# Patient Record
Sex: MALE | Race: WHITE | NOT HISPANIC OR LATINO
[De-identification: names, ages, dates, MRNs, and addresses within clinical notes are randomized per-mention and may not be internally consistent; named-entity substitution may affect disease eponyms.]

---

## 2015-05-25 RX ORDER — INSULIN GLARGINE 100 [IU]/ML
22 INJECTION, SOLUTION SUBCUTANEOUS
Qty: 0 | Refills: 0 | DISCHARGE
Start: 2015-05-25

## 2015-05-25 RX ORDER — INSULIN LISPRO 100/ML
10 VIAL (ML) SUBCUTANEOUS
Qty: 0 | Refills: 0 | DISCHARGE
Start: 2015-05-25

## 2019-08-20 ENCOUNTER — APPOINTMENT (OUTPATIENT)
Dept: UROLOGY | Facility: CLINIC | Age: 84
End: 2019-08-20
Payer: MEDICARE

## 2019-08-20 VITALS
HEART RATE: 77 BPM | TEMPERATURE: 98.9 F | HEIGHT: 70 IN | BODY MASS INDEX: 21.47 KG/M2 | WEIGHT: 150 LBS | DIASTOLIC BLOOD PRESSURE: 46 MMHG | SYSTOLIC BLOOD PRESSURE: 114 MMHG

## 2019-08-20 DIAGNOSIS — Z86.73 PERSONAL HISTORY OF TRANSIENT ISCHEMIC ATTACK (TIA), AND CEREBRAL INFARCTION W/OUT RESIDUAL DEFICITS: ICD-10-CM

## 2019-08-20 DIAGNOSIS — N13.8 BENIGN PROSTATIC HYPERPLASIA WITH LOWER URINARY TRACT SYMPMS: ICD-10-CM

## 2019-08-20 DIAGNOSIS — N52.9 MALE ERECTILE DYSFUNCTION, UNSPECIFIED: ICD-10-CM

## 2019-08-20 DIAGNOSIS — N40.1 BENIGN PROSTATIC HYPERPLASIA WITH LOWER URINARY TRACT SYMPMS: ICD-10-CM

## 2019-08-20 DIAGNOSIS — R32 UNSPECIFIED URINARY INCONTINENCE: ICD-10-CM

## 2019-08-20 LAB
BILIRUB UR QL STRIP: NEGATIVE
COLLECTION METHOD: NORMAL
GLUCOSE UR-MCNC: NORMAL
HCG UR QL: 0.2 EU/DL
HGB UR QL STRIP.AUTO: NORMAL
KETONES UR-MCNC: NEGATIVE
LEUKOCYTE ESTERASE UR QL STRIP: NORMAL
NITRITE UR QL STRIP: NEGATIVE
PH UR STRIP: 5.5
PROT UR STRIP-MCNC: NORMAL
SP GR UR STRIP: 1.02

## 2019-08-20 PROCEDURE — 51798 US URINE CAPACITY MEASURE: CPT

## 2019-08-20 PROCEDURE — 99204 OFFICE O/P NEW MOD 45 MIN: CPT | Mod: 25

## 2019-08-20 NOTE — HISTORY OF PRESENT ILLNESS
[FreeTextEntry1] : Patient is a 89 yo M who presents for ED, urinary incontinence.\par \par These are chronic urologic conditions that have been going for many years. \par \par He sits to void and does feel that he is able to void/initiate stream.  He wears diapers x3 daily - very wet when changed.  He feels that he has post-void dribbling and urge incontinence.  He also leaks at nighttime, places a pad on bed.  At times he has essentially unaware incontinence or post void dribbling - he is unable to really differentiate.\par \par He was previously seen by my partner Dr Barnett ~5 yrs ago.  Had UDS in 2012 which did not reveal any MAGDALENE or significant OAB or BPH/PERALTA.  He was given vesicare - unclear if he had any benefit.\par \par He also reports severe ED - cannot obtain or maintain erections.  He states that his wife is very upset about not having sex.  Has never tried viagra or oral medications per pt.  He cannot obtain even a partial erections but still has desire.

## 2019-08-20 NOTE — ASSESSMENT
[FreeTextEntry1] : Patient is a 89 yo M who presents for urologic issues - mainly ,longstanding severe ED and incontinence.\par \par He is more bothered by ED and incontinence.\par He is not bothered by having use depends diapers and pads.\par He is not interested in treatment for incontinence as he notes he has adapted, particularly after we discussed potential SE and risks of anticholinergics or beta3 agonists.  He had prior UDS with no remarkable findings.\par Suspect bladder dysfunction 2/2 DM.\par PVR today 0cc\par Udip + - will send for urine cx. Suspect bacteruria, but given incontinence, would consider treatment course with abx pending cx results.\par For ED d/w pt that trial of medications such as viagra or cialis is unlikely to be significantly effective for severe ED and do have significant risks.  He has significant comorbidities including cardiac disease, vascular disease, DM.  Discussed with pt that he obtains clearance from his cardiologist would consider trial of medication, but given overall health would not recommend.  \par He will discuss further with his wife and cardiologist.\par F/u PRN

## 2019-08-20 NOTE — REVIEW OF SYSTEMS
[Shortness Of Breath] : shortness of breath [Feeling Tired] : feeling tired [Diarrhea] : diarrhea [Constipation] : constipation [Discharge from urine canal] : discharge from urine canal [Wake up at night to urinate  How many times?  ___] : wakes up to urinate [unfilled] times during the night [Slow urine stream] : slow urine stream [Bladder fullness after urinating] : bladder fullness after urinating [Leakage of urine with urgency] : leakage of urine with urgency [Limb Weakness] : limb weakness [Unaware of when urine is leaking] : unaware of when urine is leaking [Difficulty Walking] : difficulty walking [Muscle Weakness] : muscle weakness [Feelings Of Weakness] : feelings of weakness [Easy Bleeding] : a tendency for easy bleeding [Easy Bruising] : a tendency for easy bruising [Negative] : Psychiatric

## 2019-08-20 NOTE — PHYSICAL EXAM
[General Appearance - Well Nourished] : well nourished [General Appearance - Well Developed] : well developed [Normal Appearance] : normal appearance [Well Groomed] : well groomed [Edema] : no peripheral edema [General Appearance - In No Acute Distress] : no acute distress [Respiration, Rhythm And Depth] : normal respiratory rhythm and effort [Exaggerated Use Of Accessory Muscles For Inspiration] : no accessory muscle use [Abdomen Soft] : soft [Abdomen Tenderness] : non-tender [Urinary Bladder Findings] : the bladder was normal on palpation [FreeTextEntry1] : wheelchair bound [] : no rash [No Focal Deficits] : no focal deficits [Oriented To Time, Place, And Person] : oriented to person, place, and time [Not Anxious] : not anxious [Affect] : the affect was normal [Mood] : the mood was normal

## 2019-08-26 ENCOUNTER — APPOINTMENT (OUTPATIENT)
Age: 84
End: 2019-08-26

## 2019-08-26 LAB — BACTERIA UR CULT: ABNORMAL

## 2019-08-26 RX ORDER — SULFAMETHOXAZOLE AND TRIMETHOPRIM 800; 160 MG/1; MG/1
800-160 TABLET ORAL TWICE DAILY
Qty: 14 | Refills: 0 | Status: ACTIVE | COMMUNITY
Start: 2019-08-26 | End: 1900-01-01

## 2019-10-06 ENCOUNTER — TRANSCRIPTION ENCOUNTER (OUTPATIENT)
Age: 84
End: 2019-10-06

## 2019-10-06 ENCOUNTER — INPATIENT (INPATIENT)
Facility: HOSPITAL | Age: 84
LOS: 2 days | Discharge: HOME | DRG: 378 | End: 2019-10-09
Attending: HOSPITALIST | Admitting: HOSPITALIST
Payer: MEDICARE

## 2019-10-06 VITALS
HEART RATE: 72 BPM | WEIGHT: 149.91 LBS | HEIGHT: 72 IN | SYSTOLIC BLOOD PRESSURE: 118 MMHG | RESPIRATION RATE: 20 BRPM | TEMPERATURE: 98 F | DIASTOLIC BLOOD PRESSURE: 73 MMHG

## 2019-10-06 DIAGNOSIS — I25.10 ATHEROSCLEROTIC HEART DISEASE OF NATIVE CORONARY ARTERY WITHOUT ANGINA PECTORIS: ICD-10-CM

## 2019-10-06 DIAGNOSIS — R07.89 OTHER CHEST PAIN: ICD-10-CM

## 2019-10-06 DIAGNOSIS — K62.5 HEMORRHAGE OF ANUS AND RECTUM: ICD-10-CM

## 2019-10-06 DIAGNOSIS — I10 ESSENTIAL (PRIMARY) HYPERTENSION: ICD-10-CM

## 2019-10-06 DIAGNOSIS — I73.9 PERIPHERAL VASCULAR DISEASE, UNSPECIFIED: ICD-10-CM

## 2019-10-06 DIAGNOSIS — E11.9 TYPE 2 DIABETES MELLITUS WITHOUT COMPLICATIONS: ICD-10-CM

## 2019-10-06 DIAGNOSIS — Z29.9 ENCOUNTER FOR PROPHYLACTIC MEASURES, UNSPECIFIED: ICD-10-CM

## 2019-10-06 DIAGNOSIS — Z79.899 OTHER LONG TERM (CURRENT) DRUG THERAPY: ICD-10-CM

## 2019-10-06 DIAGNOSIS — N17.9 ACUTE KIDNEY FAILURE, UNSPECIFIED: ICD-10-CM

## 2019-10-06 DIAGNOSIS — G40.909 EPILEPSY, UNSPECIFIED, NOT INTRACTABLE, WITHOUT STATUS EPILEPTICUS: ICD-10-CM

## 2019-10-06 LAB
ALBUMIN SERPL ELPH-MCNC: 3.1 G/DL — LOW (ref 3.3–5)
ALP SERPL-CCNC: 101 U/L — SIGNIFICANT CHANGE UP (ref 40–120)
ALT FLD-CCNC: 19 U/L — SIGNIFICANT CHANGE UP (ref 10–45)
ANION GAP SERPL CALC-SCNC: 10 MMOL/L — SIGNIFICANT CHANGE UP (ref 5–17)
ANISOCYTOSIS BLD QL: SLIGHT — SIGNIFICANT CHANGE UP
APTT BLD: 20.8 SEC — LOW (ref 27.5–36.3)
AST SERPL-CCNC: 15 U/L — SIGNIFICANT CHANGE UP (ref 10–40)
BASOPHILS # BLD AUTO: 0.26 K/UL — HIGH (ref 0–0.2)
BASOPHILS NFR BLD AUTO: 2.6 % — HIGH (ref 0–2)
BILIRUB SERPL-MCNC: 0.1 MG/DL — LOW (ref 0.2–1.2)
BLD GP AB SCN SERPL QL: NEGATIVE — SIGNIFICANT CHANGE UP
BUN SERPL-MCNC: 48 MG/DL — HIGH (ref 7–23)
CALCIUM SERPL-MCNC: 7.7 MG/DL — LOW (ref 8.4–10.5)
CHLORIDE SERPL-SCNC: 106 MMOL/L — SIGNIFICANT CHANGE UP (ref 96–108)
CO2 SERPL-SCNC: 17 MMOL/L — LOW (ref 22–31)
CREAT SERPL-MCNC: 1.43 MG/DL — HIGH (ref 0.5–1.3)
EOSINOPHIL # BLD AUTO: 0.09 K/UL — SIGNIFICANT CHANGE UP (ref 0–0.5)
EOSINOPHIL NFR BLD AUTO: 0.9 % — SIGNIFICANT CHANGE UP (ref 0–6)
GIANT PLATELETS BLD QL SMEAR: PRESENT — SIGNIFICANT CHANGE UP
GLUCOSE BLDC GLUCOMTR-MCNC: 215 MG/DL — HIGH (ref 70–99)
GLUCOSE BLDC GLUCOMTR-MCNC: 225 MG/DL — HIGH (ref 70–99)
GLUCOSE BLDC GLUCOMTR-MCNC: 271 MG/DL — HIGH (ref 70–99)
GLUCOSE BLDC GLUCOMTR-MCNC: 358 MG/DL — HIGH (ref 70–99)
GLUCOSE SERPL-MCNC: 237 MG/DL — HIGH (ref 70–99)
HCT VFR BLD CALC: 23.3 % — LOW (ref 39–50)
HCT VFR BLD CALC: 24.3 % — LOW (ref 39–50)
HCT VFR BLD CALC: 24.9 % — LOW (ref 39–50)
HGB BLD-MCNC: 7.5 G/DL — LOW (ref 13–17)
HGB BLD-MCNC: 7.6 G/DL — LOW (ref 13–17)
HGB BLD-MCNC: 8.2 G/DL — LOW (ref 13–17)
INR BLD: 1.08 RATIO — SIGNIFICANT CHANGE UP (ref 0.88–1.16)
LYMPHOCYTES # BLD AUTO: 2.57 K/UL — SIGNIFICANT CHANGE UP (ref 1–3.3)
LYMPHOCYTES # BLD AUTO: 26.1 % — SIGNIFICANT CHANGE UP (ref 13–44)
MACROCYTES BLD QL: SLIGHT — SIGNIFICANT CHANGE UP
MANUAL SMEAR VERIFICATION: SIGNIFICANT CHANGE UP
MCHC RBC-ENTMCNC: 31.3 GM/DL — LOW (ref 32–36)
MCHC RBC-ENTMCNC: 32.2 GM/DL — SIGNIFICANT CHANGE UP (ref 32–36)
MCHC RBC-ENTMCNC: 32.9 GM/DL — SIGNIFICANT CHANGE UP (ref 32–36)
MCHC RBC-ENTMCNC: 34 PG — SIGNIFICANT CHANGE UP (ref 27–34)
MCHC RBC-ENTMCNC: 34.5 PG — HIGH (ref 27–34)
MCHC RBC-ENTMCNC: 35.2 PG — HIGH (ref 27–34)
MCV RBC AUTO: 103.3 FL — HIGH (ref 80–100)
MCV RBC AUTO: 109.4 FL — HIGH (ref 80–100)
MCV RBC AUTO: 110.5 FL — HIGH (ref 80–100)
MONOCYTES # BLD AUTO: 0.6 K/UL — SIGNIFICANT CHANGE UP (ref 0–0.9)
MONOCYTES NFR BLD AUTO: 6.1 % — SIGNIFICANT CHANGE UP (ref 2–14)
NEUTROPHILS # BLD AUTO: 5.99 K/UL — SIGNIFICANT CHANGE UP (ref 1.8–7.4)
NEUTROPHILS NFR BLD AUTO: 60.8 % — SIGNIFICANT CHANGE UP (ref 43–77)
NRBC # BLD: 0 /100 WBCS — SIGNIFICANT CHANGE UP (ref 0–0)
NRBC # BLD: 0 /100 WBCS — SIGNIFICANT CHANGE UP (ref 0–0)
OB PNL STL: POSITIVE
OVALOCYTES BLD QL SMEAR: SLIGHT — SIGNIFICANT CHANGE UP
PLAT MORPH BLD: NORMAL — SIGNIFICANT CHANGE UP
PLATELET # BLD AUTO: 166 K/UL — SIGNIFICANT CHANGE UP (ref 150–400)
PLATELET # BLD AUTO: 174 K/UL — SIGNIFICANT CHANGE UP (ref 150–400)
PLATELET # BLD AUTO: 183 K/UL — SIGNIFICANT CHANGE UP (ref 150–400)
POIKILOCYTOSIS BLD QL AUTO: SLIGHT — SIGNIFICANT CHANGE UP
POTASSIUM SERPL-MCNC: 5.3 MMOL/L — SIGNIFICANT CHANGE UP (ref 3.5–5.3)
POTASSIUM SERPL-SCNC: 5.3 MMOL/L — SIGNIFICANT CHANGE UP (ref 3.5–5.3)
PROT SERPL-MCNC: 5.9 G/DL — LOW (ref 6–8.3)
PROTHROM AB SERPL-ACNC: 12.4 SEC — SIGNIFICANT CHANGE UP (ref 10–12.9)
RBC # BLD: 2.13 M/UL — LOW (ref 4.2–5.8)
RBC # BLD: 2.2 M/UL — LOW (ref 4.2–5.8)
RBC # BLD: 2.41 M/UL — LOW (ref 4.2–5.8)
RBC # FLD: 14.2 % — SIGNIFICANT CHANGE UP (ref 10.3–14.5)
RBC # FLD: 14.4 % — SIGNIFICANT CHANGE UP (ref 10.3–14.5)
RBC # FLD: 17 % — HIGH (ref 10.3–14.5)
RBC BLD AUTO: SIGNIFICANT CHANGE UP
RH IG SCN BLD-IMP: POSITIVE — SIGNIFICANT CHANGE UP
SODIUM SERPL-SCNC: 133 MMOL/L — LOW (ref 135–145)
SPHEROCYTES BLD QL SMEAR: SLIGHT — SIGNIFICANT CHANGE UP
TROPONIN T, HIGH SENSITIVITY RESULT: 25 NG/L — SIGNIFICANT CHANGE UP (ref 0–51)
VARIANT LYMPHS # BLD: 3.5 % — SIGNIFICANT CHANGE UP (ref 0–6)
WBC # BLD: 10.34 K/UL — SIGNIFICANT CHANGE UP (ref 3.8–10.5)
WBC # BLD: 9.14 K/UL — SIGNIFICANT CHANGE UP (ref 3.8–10.5)
WBC # BLD: 9.86 K/UL — SIGNIFICANT CHANGE UP (ref 3.8–10.5)
WBC # FLD AUTO: 10.34 K/UL — SIGNIFICANT CHANGE UP (ref 3.8–10.5)
WBC # FLD AUTO: 9.14 K/UL — SIGNIFICANT CHANGE UP (ref 3.8–10.5)
WBC # FLD AUTO: 9.86 K/UL — SIGNIFICANT CHANGE UP (ref 3.8–10.5)

## 2019-10-06 PROCEDURE — 71045 X-RAY EXAM CHEST 1 VIEW: CPT | Mod: 26

## 2019-10-06 PROCEDURE — 99285 EMERGENCY DEPT VISIT HI MDM: CPT | Mod: 25

## 2019-10-06 PROCEDURE — 93010 ELECTROCARDIOGRAM REPORT: CPT

## 2019-10-06 PROCEDURE — 99223 1ST HOSP IP/OBS HIGH 75: CPT | Mod: GC

## 2019-10-06 RX ORDER — GLUCAGON INJECTION, SOLUTION 0.5 MG/.1ML
1 INJECTION, SOLUTION SUBCUTANEOUS ONCE
Refills: 0 | Status: DISCONTINUED | OUTPATIENT
Start: 2019-10-06 | End: 2019-10-09

## 2019-10-06 RX ORDER — ATENOLOL 25 MG/1
12.5 TABLET ORAL DAILY
Refills: 0 | Status: DISCONTINUED | OUTPATIENT
Start: 2019-10-06 | End: 2019-10-06

## 2019-10-06 RX ORDER — PANTOPRAZOLE SODIUM 20 MG/1
40 TABLET, DELAYED RELEASE ORAL
Refills: 0 | Status: DISCONTINUED | OUTPATIENT
Start: 2019-10-06 | End: 2019-10-08

## 2019-10-06 RX ORDER — SODIUM CHLORIDE 9 MG/ML
1000 INJECTION INTRAMUSCULAR; INTRAVENOUS; SUBCUTANEOUS ONCE
Refills: 0 | Status: COMPLETED | OUTPATIENT
Start: 2019-10-06 | End: 2019-10-06

## 2019-10-06 RX ORDER — DONEPEZIL HYDROCHLORIDE 10 MG/1
10 TABLET, FILM COATED ORAL AT BEDTIME
Refills: 0 | Status: DISCONTINUED | OUTPATIENT
Start: 2019-10-06 | End: 2019-10-09

## 2019-10-06 RX ORDER — SODIUM CHLORIDE 9 MG/ML
1000 INJECTION, SOLUTION INTRAVENOUS
Refills: 0 | Status: DISCONTINUED | OUTPATIENT
Start: 2019-10-06 | End: 2019-10-09

## 2019-10-06 RX ORDER — INSULIN LISPRO 100/ML
5 VIAL (ML) SUBCUTANEOUS
Refills: 0 | Status: DISCONTINUED | OUTPATIENT
Start: 2019-10-06 | End: 2019-10-07

## 2019-10-06 RX ORDER — CLOPIDOGREL BISULFATE 75 MG/1
75 TABLET, FILM COATED ORAL DAILY
Refills: 0 | Status: DISCONTINUED | OUTPATIENT
Start: 2019-10-06 | End: 2019-10-06

## 2019-10-06 RX ORDER — DULOXETINE HYDROCHLORIDE 30 MG/1
30 CAPSULE, DELAYED RELEASE ORAL
Refills: 0 | Status: DISCONTINUED | OUTPATIENT
Start: 2019-10-06 | End: 2019-10-09

## 2019-10-06 RX ORDER — DEXTROSE 50 % IN WATER 50 %
15 SYRINGE (ML) INTRAVENOUS ONCE
Refills: 0 | Status: DISCONTINUED | OUTPATIENT
Start: 2019-10-06 | End: 2019-10-09

## 2019-10-06 RX ORDER — FERROUS SULFATE 325(65) MG
325 TABLET ORAL
Refills: 0 | Status: DISCONTINUED | OUTPATIENT
Start: 2019-10-06 | End: 2019-10-07

## 2019-10-06 RX ORDER — CHOLECALCIFEROL (VITAMIN D3) 125 MCG
1000 CAPSULE ORAL DAILY
Refills: 0 | Status: DISCONTINUED | OUTPATIENT
Start: 2019-10-06 | End: 2019-10-09

## 2019-10-06 RX ORDER — INFLUENZA VIRUS VACCINE 15; 15; 15; 15 UG/.5ML; UG/.5ML; UG/.5ML; UG/.5ML
0.5 SUSPENSION INTRAMUSCULAR ONCE
Refills: 0 | Status: COMPLETED | OUTPATIENT
Start: 2019-10-06 | End: 2019-10-09

## 2019-10-06 RX ORDER — DEXTROSE 50 % IN WATER 50 %
12.5 SYRINGE (ML) INTRAVENOUS ONCE
Refills: 0 | Status: DISCONTINUED | OUTPATIENT
Start: 2019-10-06 | End: 2019-10-09

## 2019-10-06 RX ORDER — INSULIN GLARGINE 100 [IU]/ML
11 INJECTION, SOLUTION SUBCUTANEOUS AT BEDTIME
Refills: 0 | Status: DISCONTINUED | OUTPATIENT
Start: 2019-10-06 | End: 2019-10-07

## 2019-10-06 RX ORDER — INSULIN LISPRO 100/ML
VIAL (ML) SUBCUTANEOUS
Refills: 0 | Status: DISCONTINUED | OUTPATIENT
Start: 2019-10-06 | End: 2019-10-09

## 2019-10-06 RX ORDER — INSULIN LISPRO 100/ML
VIAL (ML) SUBCUTANEOUS AT BEDTIME
Refills: 0 | Status: DISCONTINUED | OUTPATIENT
Start: 2019-10-06 | End: 2019-10-09

## 2019-10-06 RX ORDER — ATORVASTATIN CALCIUM 80 MG/1
40 TABLET, FILM COATED ORAL AT BEDTIME
Refills: 0 | Status: DISCONTINUED | OUTPATIENT
Start: 2019-10-06 | End: 2019-10-09

## 2019-10-06 RX ORDER — ASPIRIN/CALCIUM CARB/MAGNESIUM 324 MG
325 TABLET ORAL DAILY
Refills: 0 | Status: DISCONTINUED | OUTPATIENT
Start: 2019-10-06 | End: 2019-10-06

## 2019-10-06 RX ADMIN — Medication 1000 UNIT(S): at 11:23

## 2019-10-06 RX ADMIN — DONEPEZIL HYDROCHLORIDE 10 MILLIGRAM(S): 10 TABLET, FILM COATED ORAL at 20:15

## 2019-10-06 RX ADMIN — Medication 300 MILLIGRAM(S): at 20:15

## 2019-10-06 RX ADMIN — SODIUM CHLORIDE 1000 MILLILITER(S): 9 INJECTION INTRAMUSCULAR; INTRAVENOUS; SUBCUTANEOUS at 03:09

## 2019-10-06 RX ADMIN — Medication 5: at 11:23

## 2019-10-06 RX ADMIN — ATORVASTATIN CALCIUM 40 MILLIGRAM(S): 80 TABLET, FILM COATED ORAL at 20:15

## 2019-10-06 RX ADMIN — ATENOLOL 12.5 MILLIGRAM(S): 25 TABLET ORAL at 11:19

## 2019-10-06 RX ADMIN — PANTOPRAZOLE SODIUM 40 MILLIGRAM(S): 20 TABLET, DELAYED RELEASE ORAL at 20:14

## 2019-10-06 RX ADMIN — DULOXETINE HYDROCHLORIDE 30 MILLIGRAM(S): 30 CAPSULE, DELAYED RELEASE ORAL at 20:15

## 2019-10-06 RX ADMIN — Medication 5 UNIT(S): at 11:23

## 2019-10-06 NOTE — H&P ADULT - PROBLEM SELECTOR PLAN 4
Home dose of lantus 22U, humalog 10U before meals  -halved home dose while in hospital, 11U lantus, 5U humalog   -c/w low dose ISS   -fingerstick before meals and before bed

## 2019-10-06 NOTE — ED PROVIDER NOTE - CLINICAL SUMMARY MEDICAL DECISION MAKING FREE TEXT BOX
89 yo with hx of CAD sp 3 stents on Plavix and aspirin presents with rectal bleeding  and chest pain for 1 day. Vitals WNL. BRBPR. Likely lower GI bleed. Concerning for ACS given chest pain in the setting of blood loss and hx of CAD. Will do cardiac workup, cbc, cmp, coags, type and screen, troponin, ekg. Will re-evaluate and likely admit for lower GI bleed and ACS evaluation.

## 2019-10-06 NOTE — ED ADULT NURSE NOTE - OBJECTIVE STATEMENT
pt is a 89 yr M presents with rectal bleeding x 1 day, dark red blood with clots x 4 episodes. pt on plavix, denies abd pain/fever/chills/dizziness. reports mild nausea, no vomiting, mild sob. as per family pt is slightly paler than normal. pt presents with R AC 20 placed by EMS. pt ambulates with walker at baseline. +2 BLE edema noted, pt reports this is worse than normal. no acute distress. son at bedside.

## 2019-10-06 NOTE — CONSULT NOTE ADULT - ATTENDING COMMENTS
Patient seen and examined. Agree with above. He presented with hematochezia, but has not had any additional episodes since he's been on the floors. Discussed with patient regarding colonoscopy and the risks/benefits, he would like to hold off if possible. Would monitor CBC, transfuse as needed, and keep on clears for now. If bleeding continues, will re-visit endoscopic evaluation with patient.

## 2019-10-06 NOTE — H&P ADULT - PROBLEM SELECTOR PLAN 9
DVT Prophylaxis: on home plavix   Diet: DASH/TLC   Dispo: to home meds reconciled w/ pt's son at bedside meds reconciled w/ pt's son at bedside  -will f/u w/ Dr. Jacobs regarding why pt is on aspirin 325 instead of 81

## 2019-10-06 NOTE — DISCHARGE NOTE PROVIDER - HOSPITAL COURSE
HPI: 89yoM w/ hx of T2DM, HTN, remote hx of MI, TIA, CAD s/p 5 stents on Plavix and PVD presents w/ 4 ep of BRBPR x 1 day. Patient wears a diaper at baseline and reports 4 ep of BRBPR while using the toilet. The first ep was reported as maroon in color. The latter 3 episodes were reported as bao red blood w/ some clots. Pt noticed blood dripping w/ blood-soaked diapers. Patient denies any pain or syncope during these episodes. Pt reports feeling nauseous occasionally but no vomit. Pt has a history of internal and external hemorrhoids for which he received hemorrhoid banding surgery x2 twenty and fifty years ago respectively. Patient denies recent travel, sick contacts, changes in diet, changes in appetite or abnormal weight change. Patient denies dysuria, constipation, diarrhea, fevers, chills, hemoptysis. Patient states that he has SOB when he exerts himself and alice chest discomfort that occasionally radiates down his left arm. This has been going on for 2 weeks. Patient's last colonoscopy was over 20 years ago for which pt had ~2 polyps removed. At baseline, patient ambulates with a walker. Lives at home with wife and home health aide that comes everyday for 12 hrs. No hx of cancer in the pt and his family.            In the ED, vitals T 98, HR 72, /73, RR 20, spO2 95 on RA. CXR negative. FOBT +. Labs were sig for hgb 7.5 (baseline in 2015 ~8), Cr 1.43 (baseline 1.1 in 2015). Trops 25. Given 1 x NS in the ED.           Hospital Course: Patient was admitted to medicine for further management of suspected LGIB. Patient was given 1u pRBC and started on protonix IV. In house GI was called and they recommended _____________. Patient now hemodynamically stable and ready for discharge. HPI: 89yoM w/ hx of T2DM, HTN, remote hx of MI, TIA, CAD s/p 5 stents on Plavix and PVD presents w/ 4 ep of BRBPR x 1 day. Patient wears a diaper at baseline and reports 4 ep of BRBPR while using the toilet. The first ep was reported as maroon in color. The latter 3 episodes were reported as bao red blood w/ some clots. Pt noticed blood dripping w/ blood-soaked diapers. Patient denies any pain or syncope during these episodes. Pt reports feeling nauseous occasionally but no vomit. Pt has a history of internal and external hemorrhoids for which he received hemorrhoid banding surgery x2 twenty and fifty years ago respectively. Patient denies recent travel, sick contacts, changes in diet, changes in appetite or abnormal weight change. Patient denies dysuria, constipation, diarrhea, fevers, chills, hemoptysis. Patient states that he has SOB when he exerts himself and alice chest discomfort that occasionally radiates down his left arm. This has been going on for 2 weeks. Patient's last colonoscopy was over 20 years ago for which pt had ~2 polyps removed. At baseline, patient ambulates with a walker. Lives at home with wife and home health aide that comes everyday for 12 hrs. No hx of cancer in the pt and his family.            In the ED, vitals T 98, HR 72, /73, RR 20, spO2 95 on RA. CXR negative. FOBT +. Labs were sig for hgb 7.5 (baseline in 2015 ~8), Cr 1.43 (baseline 1.1 in 2015). Trops 25. Given 1 x NS in the ED.           Hospital Course: Patient was admitted to medicine for further management of suspected LGIB. Patient was given 1u pRBC and started on protonix. In house GI was called and they recommended continued monitoring for rectal bleeding. Patient was resumed on regular diet. Patient did not have further episodes of rectal bleeding during his hospital stay. Patient was on both plavix and aspirin 325 as outpatient. Patient's cardiologist was contacted and was agreeable to stopping the aspirin as patient's last stent was >  6 years ago. Patient was resumed on home plavix w/ no further episodes of bleeding. Patient now hemodynamically stable and ready for discharge.

## 2019-10-06 NOTE — H&P ADULT - NSHPLABSRESULTS_GEN_ALL_CORE
7.5    10.34 )-----------( 183      ( 06 Oct 2019 05:13 )             23.3       10-06    133<L>  |  106  |  48<H>  ----------------------------<  237<H>  5.3   |  17<L>  |  1.43<H>    Ca    7.7<L>      06 Oct 2019 02:52    TPro  5.9<L>  /  Alb  3.1<L>  /  TBili  0.1<L>  /  DBili  x   /  AST  15  /  ALT  19  /  AlkPhos  101  10-06                  PT/INR - ( 06 Oct 2019 02:52 )   PT: 12.4 sec;   INR: 1.08 ratio         PTT - ( 06 Oct 2019 02:52 )  PTT:20.8 sec    Lactate Trend            CAPILLARY BLOOD GLUCOSE 7.5    10.34 )-----------( 183      ( 06 Oct 2019 05:13 )             23.3       10-06    133<L>  |  106  |  48<H>  ----------------------------<  237<H>  5.3   |  17<L>  |  1.43<H>    Ca    7.7<L>      06 Oct 2019 02:52    TPro  5.9<L>  /  Alb  3.1<L>  /  TBili  0.1<L>  /  DBili  x   /  AST  15  /  ALT  19  /  AlkPhos  101  10-06                  PT/INR - ( 06 Oct 2019 02:52 )   PT: 12.4 sec;   INR: 1.08 ratio         PTT - ( 06 Oct 2019 02:52 )  PTT:20.8 sec    Lactate Trend            CAPILLARY BLOOD GLUCOSE    FOBT+    < from: Xray Chest 1 View- PORTABLE-Urgent (10.06.19 @ 03:36) >    IMPRESSION:    Clear lungs.    < end of copied text >

## 2019-10-06 NOTE — H&P ADULT - NSHPPHYSICALEXAM_GEN_ALL_CORE
PHYSICAL EXAM:  GENERAL: NAD, well-groomed, well-developed  HEAD:  Atraumatic, Normocephalic  EYES: EOMI, PERRLA, conjunctiva and sclera clear  ENMT: No tonsillar erythema, exudates, or enlargement; Moist mucous membranes, Good dentition, No lesions  NECK: Supple, No JVD, Normal thyroid  CHEST/LUNG: Clear to percussion bilaterally; No rales, rhonchi, wheezing, or rubs  HEART: Regular rate and rhythm; No murmurs, rubs, or gallops  ABDOMEN: Soft, Nontender, Nondistended; Bowel sounds present  VASCULAR:  2+ Peripheral Pulses, No clubbing, cyanosis, or edema  LYMPH: No lymphadenopathy noted  SKIN: No rashes or lesions  NERVOUS SYSTEM:  Alert & Oriented X3, Good concentration; Motor Strength 5/5 B/L upper and lower extremities; DTRs 2+ intact and symmetric PHYSICAL EXAM:  GENERAL: NAD, well-groomed, well-developed elderly gentleman   HEAD:  Atraumatic, Normocephalic  EYES: EOMI, PERRLA, conjunctiva and sclera clear  ENMT: No tonsillar erythema, exudates, or enlargement; Moist mucous membranes, Good dentition, No lesions  CHEST/LUNG: Clear to percussion bilaterally; No rales, rhonchi, wheezing, or rubs  HEART: Regular rate and rhythm; No murmurs, rubs, or gallops  ABDOMEN: Soft, Nontender, Nondistended; Bowel sounds present  VASCULAR:  2+ Peripheral Pulses, No clubbing, cyanosis, or edema  LYMPH: No lymphadenopathy noted  SKIN: No rashes or lesions  NERVOUS SYSTEM:  Alert & Oriented X3, Good concentration   RECTAL EXAM: pending PHYSICAL EXAM:  GENERAL: NAD, well-groomed, well-developed elderly gentleman   HEAD:  Atraumatic, Normocephalic  EYES: EOMI, PERRLA, conjunctiva and sclera clear  ENMT: No tonsillar erythema, exudates, or enlargement; Moist mucous membranes, Good dentition, No lesions  CHEST/LUNG: Clear to percussion bilaterally; No rales, rhonchi, wheezing, or rubs  HEART: Regular rate and rhythm; No murmurs, rubs, or gallops  ABDOMEN: Soft, Nontender, Nondistended; Bowel sounds present  VASCULAR:  2+ Peripheral Pulses, No clubbing, cyanosis, or edema  LYMPH: No lymphadenopathy noted  SKIN: No rashes or lesions  NERVOUS SYSTEM:  Alert & Oriented X3, Good concentration   RECTAL EXAM: no external hemorrhoids visualized. Trace perianal blood. No masses palpated on digital rectal exam. CATRACHO revealed brown stool.

## 2019-10-06 NOTE — ED PROVIDER NOTE - PROGRESS NOTE DETAILS
Attending Masom:  concerning ekg, changed from prior. ordered r sided, trop added. called pcp. no answer. will admit unattached

## 2019-10-06 NOTE — ED ADULT NURSE REASSESSMENT NOTE - NS ED NURSE REASSESS COMMENT FT1
pt 1st unit PRBC started and witnessed by 2 nurses. Blood witnessed by GLENIS Bates. According to the son this is not the first time that pt is receiving blood. Reenforced blood transfusion reactions: ie: chills, fever, flank pain, and sudden onset of anxiety. Informed son to call primary RN to inform of any concern regarding blood transfusion.

## 2019-10-06 NOTE — H&P ADULT - ATTENDING COMMENTS
Hold ASA, plavix. Hold BB.   GI consult.  IV PPI (likely lower bleed, however will cover just in case)  Clears for now.

## 2019-10-06 NOTE — H&P ADULT - PROBLEM SELECTOR PLAN 1
FOBT+. Hemodynamically stable.    -pend rectal exam  -monitor CBC qDaily   -witch hazel cream for hemorrhoids FOBT+. Hemodynamically stable.      -given pt's described large volume of bright red blood passed, will consult GI   -hold home aspirin and plavix in setting of possible GI bleed   -hold home atenolol   -will transfuse 1U pRBC for goal hbg > 8    -monitor CBC q12hrs  -start IV PPI BID  -clear diet in case pt needs c-scope     -witch hazel cream for hemorrhoids

## 2019-10-06 NOTE — ED ADULT NURSE NOTE - NSIMPLEMENTINTERV_GEN_ALL_ED
Implemented All Fall with Harm Risk Interventions:  [place] to call system. Call bell, personal items and telephone within reach. Instruct patient to call for assistance. Room bathroom lighting operational. Non-slip footwear when patient is off stretcher. Physically safe environment: no spills, clutter or unnecessary equipment. Stretcher in lowest position, wheels locked, appropriate side rails in place. Provide visual cue, wrist band, yellow gown, etc. Monitor gait and stability. Monitor for mental status changes and reorient to person, place, and time. Review medications for side effects contributing to fall risk. Reinforce activity limits and safety measures with patient and family. Provide visual clues: red socks.

## 2019-10-06 NOTE — ED PROVIDER NOTE - OBJECTIVE STATEMENT
89 yo with hx of CAD sp 3 stents on Plavix and aspirin presents with rectal bleeding for 1 day. Reports 4 episodes of BRBPR, reports wearing a diaper and noticing blood dripping and fully blood-soaked diapers.  Non-painful. Denies N, V or D. Reports substernal and left sided chest heaviness, intermittently, with left arm radiation. Denies SOB, palpitations or lightheadedness. Sees cardiology each 6 months.

## 2019-10-06 NOTE — H&P ADULT - NSICDXPASTMEDICALHX_GEN_ALL_CORE_FT
PAST MEDICAL HISTORY:  CAD (Coronary Artery Disease)     DM (Diabetes Mellitus)     HTN (Hypertension)     HTN (Hypertension)     OM (osteomyelitis)     PVD (peripheral vascular disease)

## 2019-10-06 NOTE — ED ADULT NURSE NOTE - PMH
CAD (Coronary Artery Disease)    DM (Diabetes Mellitus)    HTN (Hypertension)    HTN (Hypertension)    OM (osteomyelitis)    PVD (peripheral vascular disease)

## 2019-10-06 NOTE — DISCHARGE NOTE PROVIDER - NSDCCPCAREPLAN_GEN_ALL_CORE_FT
PRINCIPAL DISCHARGE DIAGNOSIS  Diagnosis: Rectal bleeding  Assessment and Plan of Treatment: You presented to the hospital because you had multiple episodes of bleeding via your rectum. Your blood level was low and you received a blood transfusion in the hospital. You blood level increased and you had __________. You were seen by our GI doctors and they recommended _____________. Please return to the hospital if you develop further episodes of bleeding via your rectum or if you develop chest pain that does not go away with rest. PRINCIPAL DISCHARGE DIAGNOSIS  Diagnosis: Rectal bleeding  Assessment and Plan of Treatment: You presented to the hospital because you had multiple episodes of bleeding via your rectum. Your blood level was low and you received a blood transfusion in the hospital. You blood level increased and you had no further episdoes of bleeding. You were seen by our GI doctors and they recommended observation. Please return to the hospital if you develop further episodes of bleeding via your rectum or if you develop chest pain that does not go away with rest. PRINCIPAL DISCHARGE DIAGNOSIS  Diagnosis: Rectal bleeding  Assessment and Plan of Treatment: You presented to the hospital because you had multiple episodes of bleeding via your rectum. Your blood level was low and you received a blood transfusion in the hospital. You blood level increased and you had no further episdoes of bleeding. You were seen by our GI doctors and they recommended observation. Please return to the hospital if you develop further episodes of bleeding via your rectum or if you develop chest pain that does not go away with rest.  In addition, your blood pressure medication, atenolol, was stopped in the hospital as you had normal blood pressures and normal heart rates. Please ask your primary care provider about PRINCIPAL DISCHARGE DIAGNOSIS  Diagnosis: Rectal bleeding  Assessment and Plan of Treatment: You presented to the hospital because you had multiple episodes of bleeding via your rectum. Your blood level was low and you received a blood transfusion in the hospital. You blood level increased and you had no further episdoes of bleeding. You were seen by our GI doctors and they recommended observation. Please return to the hospital if you develop further episodes of bleeding via your rectum or if you develop chest pain that does not go away with rest.  In addition, your blood pressure medication, atenolol, was stopped in the hospital as you had normal blood pressures and normal heart rates. Please ask your primary care provider about restarting this medication.

## 2019-10-06 NOTE — H&P ADULT - PROBLEM SELECTOR PLAN 2
Trops 25 --> 25. Chest discomfort w/ exertion. Most likely stable angina.   -c/w atenolol Trops 25 --> 25. Chest discomfort w/ exertion. Most likely stable angina.   -hold atenolol Trops 25 --> 25. EKG NSR. Chest discomfort w/ exertion. Most likely stable angina. Although cannot r/o symptomatic anemia.   -hold atenolol  -continue to monitor for further episodes of CP

## 2019-10-06 NOTE — CONSULT NOTE ADULT - SUBJECTIVE AND OBJECTIVE BOX
Chief Complaint:  BRBPR    HPI:  89 yo M w/ hx of T2DM, HTN, remote hx of MI, TIA, CAD s/p 5 stents on Plavix and PVD presents w/ 4 ep of BRBPR x 1 day.   Patient wears a diaper at baseline and reports 4 ep of BRBPR while using the toilet. The first ep was reported as maroon in color. The latter 3 episodes were reported as bao red blood w/ some clots. Pt noticed blood dripping w/ blood-soaked diapers.   Patient denies any abdominal pain during these episodes. Pt reports feeling nauseous occasionally but no vomit.   He has a history of internal and external hemorrhoids for which he received hemorrhoid banding surgery x2 twenty and fifty years ago respectively.     Patient reports constipation at his baseline for which he takes stool softener.  He also complains of recurrent left side chest pain , radiating into his left arm, Saw his cardiologist 2 weeks ago, recommended continue medical management        Last Colonoscopy: as per chart more than 20 years ago- patient doesn't recall having it done.  Last Endoscopy: maybe 10- 15 years ago. He doesn't recall.    Allergies:  No Known Allergies      Home Medications:    Hospital Medications:  atorvastatin 40 milliGRAM(s) Oral at bedtime  cholecalciferol 1000 Unit(s) Oral daily  dextrose 40% Gel 15 Gram(s) Oral once PRN  dextrose 5%. 1000 milliLiter(s) IV Continuous <Continuous>  dextrose 50% Injectable 12.5 Gram(s) IV Push once  donepezil 10 milliGRAM(s) Oral at bedtime  DULoxetine 30 milliGRAM(s) Oral two times a day  ferrous   sulfate Drops 325 milliGRAM(s) Oral with breakfast  glucagon  Injectable 1 milliGRAM(s) IntraMuscular once PRN  insulin glargine Injectable (LANTUS) 11 Unit(s) SubCutaneous at bedtime  insulin lispro (HumaLOG) corrective regimen sliding scale   SubCutaneous three times a day before meals  insulin lispro (HumaLOG) corrective regimen sliding scale   SubCutaneous at bedtime  insulin lispro Injectable (HumaLOG) 5 Unit(s) SubCutaneous three times a day before meals  pantoprazole  Injectable 40 milliGRAM(s) IV Push two times a day  phenytoin   Capsule 300 milliGRAM(s) Oral at bedtime      PMHX/PSHX:  OM (osteomyelitis)  PVD (peripheral vascular disease)  CAD (Coronary Artery Disease)  HTN (Hypertension)  HTN (Hypertension)  DM (Diabetes Mellitus)  History of total hip arthroplasty  S/P carotid endarterectomy      Family history:  No pertinent family history of colon CA    Social History: no smoking    ROS:   General:  No fevers, chills or night sweats.  ENT:  No sore throat or dysphagia  CV:  No pain or palpitations  Resp:  No dyspnea, cough, wheezing  GI:  as above  Skin:  No rash or edema      PHYSICAL EXAM:   GENERAL:  NAD, Appears stated age  HEENT:  NC/AT,  conjunctivae clear and pink, sclera -anicteric  CHEST:  CTA B/L, Normal effort  HEART:  RRR S1/S2, No murmurs  ABDOMEN:  Soft, non-tender, non-distended, normoactive bowel sounds,  no masses   EXTREMITIES:  No cyanosis or Edema  SKIN:  Warm & Dry. No rash or erythema  NEURO:  Alert, oriented  Rectal exam deferred: patient is the hallway in ER     Vital Signs:  Vital Signs Last 24 Hrs  T(C): 36.8 (06 Oct 2019 15:25), Max: 37.2 (06 Oct 2019 11:36)  T(F): 98.3 (06 Oct 2019 15:25), Max: 99 (06 Oct 2019 11:36)  HR: 88 (06 Oct 2019 15:25) (72 - 95)  BP: 103/61 (06 Oct 2019 15:25) (103/61 - 140/54)  BP(mean): --  RR: 20 (06 Oct 2019 15:25) (18 - 20)  SpO2: 97% (06 Oct 2019 15:25) (95% - 97%)  Daily Height in cm: 182.88 (06 Oct 2019 02:13)    Daily     LABS:                        7.5    10.34 )-----------( 183      ( 06 Oct 2019 05:13 )             23.3     Mean Cell Volume: 109.4 fl (10-06-19 @ 05:13)    10-06    133<L>  |  106  |  48<H>  ----------------------------<  237<H>  5.3   |  17<L>  |  1.43<H>    Ca    7.7<L>      06 Oct 2019 02:52    TPro  5.9<L>  /  Alb  3.1<L>  /  TBili  0.1<L>  /  DBili  x   /  AST  15  /  ALT  19  /  AlkPhos  101  10-06    LIVER FUNCTIONS - ( 06 Oct 2019 02:52 )  Alb: 3.1 g/dL / Pro: 5.9 g/dL / ALK PHOS: 101 U/L / ALT: 19 U/L / AST: 15 U/L / GGT: x           PT/INR - ( 06 Oct 2019 02:52 )   PT: 12.4 sec;   INR: 1.08 ratio         PTT - ( 06 Oct 2019 02:52 )  PTT:20.8 sec                            7.5    10.34 )-----------( 183      ( 06 Oct 2019 05:13 )             23.3                         7.6    9.86  )-----------( 174      ( 06 Oct 2019 02:52 )             24.3     Imaging: Chief Complaint:  BRBPR    HPI:  89 yo M w/ hx of T2DM, HTN, remote hx of MI, TIA, CAD s/p 5 stents on Plavix and PVD presents w/ 4 ep of BRBPR x 1 day.   Patient wears a diaper at baseline and reports 4 ep of BRBPR while using the toilet. The first ep was reported as maroon in color. The latter 3 episodes were reported as bao red blood w/ some clots. Pt noticed blood dripping w/ blood-soaked diapers.   Patient denies any abdominal pain during these episodes. Pt reports feeling nauseous occasionally but no vomit.   He has a history of internal and external hemorrhoids for which he received hemorrhoid banding surgery x2 twenty and fifty years ago respectively.     Patient reports constipation at his baseline for which he takes stool softener.  He also complains of recurrent left side chest pain , radiating into his left arm, Saw his cardiologist 2 weeks ago, recommended continue medical management.        Last Colonoscopy: as per chart more than 20 years ago- patient doesn't recall having it done.  Last Endoscopy: maybe 10- 15 years ago. He doesn't recall.    Allergies:  No Known Allergies      Home Medications:    Hospital Medications:  atorvastatin 40 milliGRAM(s) Oral at bedtime  cholecalciferol 1000 Unit(s) Oral daily  dextrose 40% Gel 15 Gram(s) Oral once PRN  dextrose 5%. 1000 milliLiter(s) IV Continuous <Continuous>  dextrose 50% Injectable 12.5 Gram(s) IV Push once  donepezil 10 milliGRAM(s) Oral at bedtime  DULoxetine 30 milliGRAM(s) Oral two times a day  ferrous   sulfate Drops 325 milliGRAM(s) Oral with breakfast  glucagon  Injectable 1 milliGRAM(s) IntraMuscular once PRN  insulin glargine Injectable (LANTUS) 11 Unit(s) SubCutaneous at bedtime  insulin lispro (HumaLOG) corrective regimen sliding scale   SubCutaneous three times a day before meals  insulin lispro (HumaLOG) corrective regimen sliding scale   SubCutaneous at bedtime  insulin lispro Injectable (HumaLOG) 5 Unit(s) SubCutaneous three times a day before meals  pantoprazole  Injectable 40 milliGRAM(s) IV Push two times a day  phenytoin   Capsule 300 milliGRAM(s) Oral at bedtime      PMHX/PSHX:  OM (osteomyelitis)  PVD (peripheral vascular disease)  CAD (Coronary Artery Disease)  HTN (Hypertension)  HTN (Hypertension)  DM (Diabetes Mellitus)  History of total hip arthroplasty  S/P carotid endarterectomy      Family history:  No pertinent family history of colon CA    Social History: no smoking or illicit drugs    ROS:   General:  No fevers, chills or night sweats.  ENT:  No sore throat or dysphagia  CV:  No pain or palpitations  Resp:  No dyspnea, cough, wheezing  GI:  as above  Skin:  No rash or edema  Neuro: No syncope  Ext: no edema  Uro: No dysuria      PHYSICAL EXAM:   GENERAL:  NAD, Appears stated age  HEENT:  NC/AT,  conjunctivae clear and pink, sclera -anicteric  CHEST:  CTA B/L, Normal effort  HEART:  RRR S1/S2, No murmurs  ABDOMEN:  Soft, non-tender, non-distended, normoactive bowel sounds,  no masses   EXTREMITIES:  No cyanosis or Edema  SKIN:  Warm & Dry. No rash or erythema  NEURO:  Alert, oriented  Rectal exam deferred: patient is the hallway in ER     Vital Signs:  Vital Signs Last 24 Hrs  T(C): 36.8 (06 Oct 2019 15:25), Max: 37.2 (06 Oct 2019 11:36)  T(F): 98.3 (06 Oct 2019 15:25), Max: 99 (06 Oct 2019 11:36)  HR: 88 (06 Oct 2019 15:25) (72 - 95)  BP: 103/61 (06 Oct 2019 15:25) (103/61 - 140/54)  BP(mean): --  RR: 20 (06 Oct 2019 15:25) (18 - 20)  SpO2: 97% (06 Oct 2019 15:25) (95% - 97%)  Daily Height in cm: 182.88 (06 Oct 2019 02:13)    Daily     LABS:                        7.5    10.34 )-----------( 183      ( 06 Oct 2019 05:13 )             23.3     Mean Cell Volume: 109.4 fl (10-06-19 @ 05:13)    10-06    133<L>  |  106  |  48<H>  ----------------------------<  237<H>  5.3   |  17<L>  |  1.43<H>    Ca    7.7<L>      06 Oct 2019 02:52    TPro  5.9<L>  /  Alb  3.1<L>  /  TBili  0.1<L>  /  DBili  x   /  AST  15  /  ALT  19  /  AlkPhos  101  10-06    LIVER FUNCTIONS - ( 06 Oct 2019 02:52 )  Alb: 3.1 g/dL / Pro: 5.9 g/dL / ALK PHOS: 101 U/L / ALT: 19 U/L / AST: 15 U/L / GGT: x           PT/INR - ( 06 Oct 2019 02:52 )   PT: 12.4 sec;   INR: 1.08 ratio         PTT - ( 06 Oct 2019 02:52 )  PTT:20.8 sec                            7.5    10.34 )-----------( 183      ( 06 Oct 2019 05:13 )             23.3                         7.6    9.86  )-----------( 174      ( 06 Oct 2019 02:52 )             24.3

## 2019-10-06 NOTE — ED PROVIDER NOTE - NS ED ROS FT
GENERAL: No fever or chills  EYES: no change in vision  HEENT: no trouble swallowing or speaking  CARDIAC: see hpi   PULMONARY: no cough or SOB  GI: see hpi  : No changes in urination  SKIN: no rashes  NEURO: no headache, numbness, or weakness.  MSK: No joint pain

## 2019-10-06 NOTE — H&P ADULT - ASSESSMENT
89yoM w/ hx of CAD s/p 3 stents on Plavix and aspiring presents w/ rectal bleeding x 1 day. 89yoM w/ hx of T2DM, HTN, remote hx of MI, TIA, CAD s/p 5 stents on Plavix and PVD presents w/ 4 ep of BRBPR x 1 day concerning for LGIB vs hemorrhoids.

## 2019-10-06 NOTE — ED PROVIDER NOTE - ST/T WAVE
st depressions in lateral leads, possible inferior leads as well but this is more subtle. will get r sided ekg

## 2019-10-06 NOTE — H&P ADULT - PROBLEM SELECTOR PLAN 3
Cr 1.43. S/p 1x IVF  -Trend Cr  -Avoid nephrotoxic drugs Cr 1.43. S/p 1x IVF  -Trend Cr qDaily   -Avoid nephrotoxic drugs

## 2019-10-06 NOTE — CONSULT NOTE ADULT - ASSESSMENT
89 year old man with medical history of T2DM, HTN, remote hx of MI, TIA, PAD, CAD s/p 5 stents ( last one more than 10 years ago) on aspirin 325 mg  and Plavix at home  presents with 4 episodes of painless bloody BM. Patient wears a diaper at baseline. The first episode was reported as maroon in color. The latter 3 episodes were reported as bright red blood w/ some clots.     Hgb on admission: 7.6 and repeat 6 hours later is 7.5 Not sure about baseline ( last hgb in the system is from 2015 = 8)    Labs were significant for UDAY with Cr: 1.4 although (last Cr in the system is from 2015 = 1.1 )    Impression:  # Hematochezia DDx: Hemorrhoidal bleed, diverticular bleed, colonic angioectasia. High risk for PUD being on aspirin 325 mg and Plavix   # Acute blood loss anemia   # CAD  # PAD  # DM, HTN    Recommendations:  - Discussed with patient and family at bedside. Preferred to hold off endoscopic evaluation and to watch for now. ( Unsure if he can drink the prep also)  - Clear liquid diet for now.  - Monitor h&h and transfuse blood  ( Giving his chest pain and history of CAD consider to keep hgb above 9 )  - IV PPI bid for now  - Consider cardiology evaluation. 89 year old man with medical history of T2DM, HTN, remote hx of MI, TIA, PAD, CAD s/p 5 stents ( last one more than 10 years ago) on aspirin 325 mg  and Plavix at home  presents with 4 episodes of painless bloody BM. Patient wears a diaper at baseline. The first episode was reported as maroon in color. The latter 3 episodes were reported as bright red blood w/ some clots.     Hgb on admission: 7.6 and repeat 6 hours later is 7.5 Not sure about baseline ( last hgb in the system is from 2015 = 8)    Labs were significant for UDAY with Cr: 1.4 although (last Cr in the system is from 2015 = 1.1 )    Impression:  # Hematochezia DDx: Hemorrhoidal bleed, diverticular bleed, colonic angioectasia, colon cancer, less likely brisk upper GI bleeding  # Acute blood loss anemia   # CAD  # PAD  # DM, HTN    Recommendations:  - Discussed with patient and family at bedside. Preferred to hold off endoscopic evaluation and to watch for now. ( Unsure if he can drink the prep also)  - Clear liquid diet for now.  - Monitor h&h and transfuse blood  ( Giving his chest pain and history of CAD consider to keep hgb above 8 )  - IV PPI bid for now

## 2019-10-06 NOTE — H&P ADULT - HISTORY OF PRESENT ILLNESS
89yoM w/ hx of CAD s/p 3 stents on Plavix and aspiring presents w/ rectal bleeding x 1 day. Patient reports 4 ep of BRBPR, reports wearing a diaper and noticing blood dripping and fully blood-soaked diapers. 89yoM w/ hx of CAD s/p 3 stents on Plavix and aspiring presents w/ rectal bleeding x 1 day. Patient reports 4 ep of BRBPR, reports wearing a diaper and noticing blood dripping and fully blood-soaked diapers.         internal and external hemorrhoids   19yo, and 50yos for hemorrhoids.   4 episodes of BRBPR .No pain. Feeling weak. A/w nausea. No vomit. Passed urine.     recent diet change? cut breakfast in half.    appetite? no changes  no recent weight weight change.     CP. across my chest. down left arm. 2 weeks. on exertion.     OPQRST   c-scope? no hx. polyps that were removed 20 years ago.  melena, hematochezia, emesis  hx of hemorrhoids  happened before   baseline ambulation. walker ambulates.   PCP   pharmacy   ac?    kidney stones, mi, 5 stents chest, 4 stents femoral (within last 6 years), stroke       used to work for [employer].   remote hx of smoking. 3ppd x6yrs. no smoking for 40 years.   no alcohol. no drugs.     [facility] pharmacy   Rena Lara   [phone]      [facility]   psh   MED LIST 89yoM w/ hx of T2DM, HTN, CAD s/p 5 stents on Plavix and PVD presents w/ 4 ep of BRBPR x 1 day. Patient wears a diaper at baseline and reports 4 ep of BRBPR while using the toilet. The first ep was reported as maroon in color. The latter 3 episodes were reported as bao red blood w/ some clots. Pt noticed blood dripping w/ blood-soaked diapers. Patient denies any pain or syncope during these episodes. Pt reports feeling nauseous occasionally but no vomit. Pt has a history of internal and external hemorrhoids for which he received hemorrhoid banding surgery x2 twenty and fifty years ago respectively. Patient denies recent travel, sick contacts, changes in diet, changes in appetite or abnormal weight change. Patient denies dysuria, constipation, diarrhea, fevers, chills, hemoptysis. Patient states that he has SOB when he exerts himself and alice chest discomfort that occasionally radiates down his left arm. This has been going on for 2 weeks. Patient's last colonoscopy was over 20 years ago for which pt had ~2 polyps removed. At baseline, patient ambulates with a walker. Lives at home with wife and home health aide that comes everyday for 12 hrs. No hx of cancer in the pt and his family.        In the ED, vitals T 98, HR 72, /73, RR 20, spO2 95 on RA. CXR negative. FOBT +. Labs were sig for hgb 7.5 (baseline in 2015 ~8), Cr 1.43 (baseline 1.1 in 2015). Trops 25. Given 1 x NS in the ED. 89yoM w/ hx of T2DM, HTN, remote hx of MI, TIA, CAD s/p 5 stents on Plavix and PVD presents w/ 4 ep of BRBPR x 1 day. Patient wears a diaper at baseline and reports 4 ep of BRBPR while using the toilet. The first ep was reported as maroon in color. The latter 3 episodes were reported as bao red blood w/ some clots. Pt noticed blood dripping w/ blood-soaked diapers. Patient denies any pain or syncope during these episodes. Pt reports feeling nauseous occasionally but no vomit. Pt has a history of internal and external hemorrhoids for which he received hemorrhoid banding surgery x2 twenty and fifty years ago respectively. Patient denies recent travel, sick contacts, changes in diet, changes in appetite or abnormal weight change. Patient denies dysuria, constipation, diarrhea, fevers, chills, hemoptysis. Patient states that he has SOB when he exerts himself and alice chest discomfort that occasionally radiates down his left arm. This has been going on for 2 weeks. Patient's last colonoscopy was over 20 years ago for which pt had ~2 polyps removed. At baseline, patient ambulates with a walker. Lives at home with wife and home health aide that comes everyday for 12 hrs. No hx of cancer in the pt and his family.        In the ED, vitals T 98, HR 72, /73, RR 20, spO2 95 on RA. CXR negative. FOBT +. Labs were sig for hgb 7.5 (baseline in 2015 ~8), Cr 1.43 (baseline 1.1 in 2015). Trops 25. Given 1 x NS in the ED.

## 2019-10-06 NOTE — DISCHARGE NOTE PROVIDER - CARE PROVIDER_API CALL
Gilma Jacobs)  Internal Medicine  [facility] [facility], [place], operated [facility], [address]  [place] [address]  Phone: [phone]  Fax: [phone]  Follow Up Time:

## 2019-10-06 NOTE — H&P ADULT - NSHPREVIEWOFSYSTEMS_GEN_ALL_CORE

## 2019-10-06 NOTE — DISCHARGE NOTE PROVIDER - NSDCFUADDAPPT_GEN_ALL_CORE_FT
Please follow up with your primary care provider within the next 2 weeks. Please follow up with your primary care provider within the next 2 weeks.    Please ask your primary care provider about restarting or stopping the blood pressure medication, atenolol.

## 2019-10-06 NOTE — ED ADULT NURSE NOTE - CHPI ED NUR SYMPTOMS NEG
no hematuria/no chills/no abdominal distension/no burning urination/no dysuria/no vomiting/no fever/no nausea

## 2019-10-06 NOTE — ED ADULT NURSE REASSESSMENT NOTE - NS ED NURSE REASSESS COMMENT FT1
Report received from Rom. Patient A&Ox3, awake, alert and following commands. Patient denies pain, shortness of breath, chest pain at this time. Not in distress. Receiving blood product. Educated about symptoms of reaction. Normal sinus rhythm on cardiac monitor. Son at bedside.

## 2019-10-06 NOTE — ED ADULT NURSE REASSESSMENT NOTE - NS ED NURSE REASSESS COMMENT FT1
Spoke to covering provider Isi, who was made aware patient's Molst formed is not signed. Isi denied need for third troponin.

## 2019-10-06 NOTE — H&P ADULT - PROBLEM SELECTOR PLAN 10
DVT Prophylaxis: on home plavix   Diet: DASH/TLC   Dispo: to home DVT Prophylaxis: aspirin/plavix held in setting of GI bleed, SCDs for now   Diet: clears   Dispo: to home DVT Prophylaxis: aspirin/plavix held in setting of GI bleed, SCDs for now   Diet: clears   Dispo: to home    #GOC: pt is DNR. Son will bring in form tmrw. DVT Prophylaxis: aspirin/plavix held in setting of GI bleed, SCDs for now   Diet: clears   Dispo: to home    #GOC: pt is DNR/DNI. MOLST form in chart, pending attending signature.

## 2019-10-07 LAB
ALBUMIN SERPL ELPH-MCNC: 3.3 G/DL — SIGNIFICANT CHANGE UP (ref 3.3–5)
ALP SERPL-CCNC: 84 U/L — SIGNIFICANT CHANGE UP (ref 40–120)
ALT FLD-CCNC: 22 U/L — SIGNIFICANT CHANGE UP (ref 10–45)
ANION GAP SERPL CALC-SCNC: 7 MMOL/L — SIGNIFICANT CHANGE UP (ref 5–17)
ANION GAP SERPL CALC-SCNC: 8 MMOL/L — SIGNIFICANT CHANGE UP (ref 5–17)
AST SERPL-CCNC: 75 U/L — HIGH (ref 10–40)
BILIRUB SERPL-MCNC: 0.2 MG/DL — SIGNIFICANT CHANGE UP (ref 0.2–1.2)
BUN SERPL-MCNC: 49 MG/DL — HIGH (ref 7–23)
BUN SERPL-MCNC: 50 MG/DL — HIGH (ref 7–23)
CALCIUM SERPL-MCNC: 8.2 MG/DL — LOW (ref 8.4–10.5)
CALCIUM SERPL-MCNC: 8.4 MG/DL — SIGNIFICANT CHANGE UP (ref 8.4–10.5)
CHLORIDE SERPL-SCNC: 107 MMOL/L — SIGNIFICANT CHANGE UP (ref 96–108)
CHLORIDE SERPL-SCNC: 108 MMOL/L — SIGNIFICANT CHANGE UP (ref 96–108)
CO2 SERPL-SCNC: 22 MMOL/L — SIGNIFICANT CHANGE UP (ref 22–31)
CO2 SERPL-SCNC: 22 MMOL/L — SIGNIFICANT CHANGE UP (ref 22–31)
CREAT SERPL-MCNC: 1.51 MG/DL — HIGH (ref 0.5–1.3)
CREAT SERPL-MCNC: 1.51 MG/DL — HIGH (ref 0.5–1.3)
GLUCOSE BLDC GLUCOMTR-MCNC: 125 MG/DL — HIGH (ref 70–99)
GLUCOSE BLDC GLUCOMTR-MCNC: 173 MG/DL — HIGH (ref 70–99)
GLUCOSE BLDC GLUCOMTR-MCNC: 209 MG/DL — HIGH (ref 70–99)
GLUCOSE BLDC GLUCOMTR-MCNC: 227 MG/DL — HIGH (ref 70–99)
GLUCOSE BLDC GLUCOMTR-MCNC: 319 MG/DL — HIGH (ref 70–99)
GLUCOSE BLDC GLUCOMTR-MCNC: 357 MG/DL — HIGH (ref 70–99)
GLUCOSE BLDC GLUCOMTR-MCNC: 67 MG/DL — LOW (ref 70–99)
GLUCOSE BLDC GLUCOMTR-MCNC: 69 MG/DL — LOW (ref 70–99)
GLUCOSE SERPL-MCNC: 187 MG/DL — HIGH (ref 70–99)
GLUCOSE SERPL-MCNC: 199 MG/DL — HIGH (ref 70–99)
HBA1C BLD-MCNC: 6.7 % — HIGH (ref 4–5.6)
HCT VFR BLD CALC: 24.2 % — LOW (ref 39–50)
HCT VFR BLD CALC: 24.2 % — LOW (ref 39–50)
HGB BLD-MCNC: 7.7 G/DL — LOW (ref 13–17)
HGB BLD-MCNC: 8 G/DL — LOW (ref 13–17)
MAGNESIUM SERPL-MCNC: 2.2 MG/DL — SIGNIFICANT CHANGE UP (ref 1.6–2.6)
MCHC RBC-ENTMCNC: 31.8 GM/DL — LOW (ref 32–36)
MCHC RBC-ENTMCNC: 33.1 GM/DL — SIGNIFICANT CHANGE UP (ref 32–36)
MCHC RBC-ENTMCNC: 33.8 PG — SIGNIFICANT CHANGE UP (ref 27–34)
MCHC RBC-ENTMCNC: 34.5 PG — HIGH (ref 27–34)
MCV RBC AUTO: 104.3 FL — HIGH (ref 80–100)
MCV RBC AUTO: 106.1 FL — HIGH (ref 80–100)
NRBC # BLD: 0 /100 WBCS — SIGNIFICANT CHANGE UP (ref 0–0)
NRBC # BLD: 0 /100 WBCS — SIGNIFICANT CHANGE UP (ref 0–0)
PHOSPHATE SERPL-MCNC: 3.7 MG/DL — SIGNIFICANT CHANGE UP (ref 2.5–4.5)
PLATELET # BLD AUTO: 161 K/UL — SIGNIFICANT CHANGE UP (ref 150–400)
PLATELET # BLD AUTO: 162 K/UL — SIGNIFICANT CHANGE UP (ref 150–400)
POTASSIUM SERPL-MCNC: 5.2 MMOL/L — SIGNIFICANT CHANGE UP (ref 3.5–5.3)
POTASSIUM SERPL-MCNC: 5.5 MMOL/L — HIGH (ref 3.5–5.3)
POTASSIUM SERPL-SCNC: 5.2 MMOL/L — SIGNIFICANT CHANGE UP (ref 3.5–5.3)
POTASSIUM SERPL-SCNC: 5.5 MMOL/L — HIGH (ref 3.5–5.3)
PROT SERPL-MCNC: 5.6 G/DL — LOW (ref 6–8.3)
RBC # BLD: 2.28 M/UL — LOW (ref 4.2–5.8)
RBC # BLD: 2.32 M/UL — LOW (ref 4.2–5.8)
RBC # FLD: 17.2 % — HIGH (ref 10.3–14.5)
RBC # FLD: 17.8 % — HIGH (ref 10.3–14.5)
SODIUM SERPL-SCNC: 137 MMOL/L — SIGNIFICANT CHANGE UP (ref 135–145)
SODIUM SERPL-SCNC: 137 MMOL/L — SIGNIFICANT CHANGE UP (ref 135–145)
WBC # BLD: 10.21 K/UL — SIGNIFICANT CHANGE UP (ref 3.8–10.5)
WBC # BLD: 8.25 K/UL — SIGNIFICANT CHANGE UP (ref 3.8–10.5)
WBC # FLD AUTO: 10.21 K/UL — SIGNIFICANT CHANGE UP (ref 3.8–10.5)
WBC # FLD AUTO: 8.25 K/UL — SIGNIFICANT CHANGE UP (ref 3.8–10.5)

## 2019-10-07 PROCEDURE — 99233 SBSQ HOSP IP/OBS HIGH 50: CPT | Mod: GC

## 2019-10-07 PROCEDURE — 99222 1ST HOSP IP/OBS MODERATE 55: CPT | Mod: GC

## 2019-10-07 RX ORDER — INSULIN GLARGINE 100 [IU]/ML
14 INJECTION, SOLUTION SUBCUTANEOUS AT BEDTIME
Refills: 0 | Status: DISCONTINUED | OUTPATIENT
Start: 2019-10-07 | End: 2019-10-08

## 2019-10-07 RX ORDER — FERROUS SULFATE 325(65) MG
325 TABLET ORAL DAILY
Refills: 0 | Status: DISCONTINUED | OUTPATIENT
Start: 2019-10-07 | End: 2019-10-09

## 2019-10-07 RX ORDER — DEXTROSE 50 % IN WATER 50 %
15 SYRINGE (ML) INTRAVENOUS ONCE
Refills: 0 | Status: COMPLETED | OUTPATIENT
Start: 2019-10-07 | End: 2019-10-07

## 2019-10-07 RX ORDER — INSULIN LISPRO 100/ML
6 VIAL (ML) SUBCUTANEOUS
Refills: 0 | Status: DISCONTINUED | OUTPATIENT
Start: 2019-10-07 | End: 2019-10-08

## 2019-10-07 RX ADMIN — INSULIN GLARGINE 11 UNIT(S): 100 INJECTION, SOLUTION SUBCUTANEOUS at 00:51

## 2019-10-07 RX ADMIN — PANTOPRAZOLE SODIUM 40 MILLIGRAM(S): 20 TABLET, DELAYED RELEASE ORAL at 06:01

## 2019-10-07 RX ADMIN — Medication 300 MILLIGRAM(S): at 21:45

## 2019-10-07 RX ADMIN — Medication 1000 UNIT(S): at 12:01

## 2019-10-07 RX ADMIN — Medication 6 UNIT(S): at 12:04

## 2019-10-07 RX ADMIN — Medication 5: at 12:04

## 2019-10-07 RX ADMIN — DONEPEZIL HYDROCHLORIDE 10 MILLIGRAM(S): 10 TABLET, FILM COATED ORAL at 21:44

## 2019-10-07 RX ADMIN — DULOXETINE HYDROCHLORIDE 30 MILLIGRAM(S): 30 CAPSULE, DELAYED RELEASE ORAL at 17:06

## 2019-10-07 RX ADMIN — DULOXETINE HYDROCHLORIDE 30 MILLIGRAM(S): 30 CAPSULE, DELAYED RELEASE ORAL at 06:01

## 2019-10-07 RX ADMIN — PANTOPRAZOLE SODIUM 40 MILLIGRAM(S): 20 TABLET, DELAYED RELEASE ORAL at 17:06

## 2019-10-07 RX ADMIN — Medication 1: at 08:33

## 2019-10-07 RX ADMIN — INSULIN GLARGINE 14 UNIT(S): 100 INJECTION, SOLUTION SUBCUTANEOUS at 21:45

## 2019-10-07 RX ADMIN — Medication 5 UNIT(S): at 08:34

## 2019-10-07 RX ADMIN — ATORVASTATIN CALCIUM 40 MILLIGRAM(S): 80 TABLET, FILM COATED ORAL at 21:44

## 2019-10-07 RX ADMIN — Medication 325 MILLIGRAM(S): at 12:04

## 2019-10-07 RX ADMIN — Medication 15 GRAM(S): at 17:03

## 2019-10-07 NOTE — PROGRESS NOTE ADULT - ATTENDING COMMENTS
acute blood loss anemia 2/2 LGIB, GI following, plan for symptomatic conservative approach, s/p 1 I PRBC this admission  c/w trending cbc q12h, if hgb<8, give transfusion given CAD hx  team discussed w/ pt's cardiologist re: antiplatelet therapy and rec to restart plavix, will f/u with GI if this is OK  PT consult pending acute blood loss anemia 2/2 LGIB, GI following, plan for symptomatic conservative approach, s/p 1 PRBC this admission  c/w trending cbc q12h, if hgb<8, give transfusion given CAD hx  team discussed w/ pt's cardiologist re: antiplatelet therapy and rec to restart plavix, will f/u with GI if this is OK  PT consult pending  prerenal UDAY on CKD - trend renal function, UOP

## 2019-10-07 NOTE — PROGRESS NOTE ADULT - PROBLEM SELECTOR PLAN 4
Home dose of lantus 22U, humalog 10U before meals  -halved home dose while in hospital, 11U lantus, 5U humalog   -c/w low dose ISS   -fingerstick before meals and before bed Home dose of lantus 22U, humalog 10U before meals  -required 6U sliding scale yesterday.   -increase bedtime lantus to 14U and humalog to 6U before meals.  -c/w low dose ISS   -fingerstick before meals and before bed Home dose of lantus 22U, humalog 10U before meals. A1C 6.7.   -required 6U sliding scale yesterday.   -increase bedtime lantus to 14U and humalog to 6U before meals.  -c/w low dose ISS   -fingerstick before meals and before bed

## 2019-10-07 NOTE — PROGRESS NOTE ADULT - PROBLEM SELECTOR PLAN 10
DVT Prophylaxis: aspirin/plavix held in setting of GI bleed, SCDs for now   Diet: clears   Dispo: to home    #GOC: pt is DNR/DNI. MOLST form in chart, pending attending signature. DVT Prophylaxis: aspirin/plavix held in setting of GI bleed, SCDs for now   Diet: clear liquid diet   Dispo: to home    #GOC: pt is DNR/DNI. MOLST form in chart, pending attending signature. DVT Prophylaxis: aspirin/plavix held in setting of GI bleed, SCDs for now   Diet: clear liquid diet   Dispo: pending PT     #GOC: pt is DNR/DNI. MOLST form in chart, pending attending signature.

## 2019-10-07 NOTE — PROGRESS NOTE ADULT - PROBLEM SELECTOR PLAN 1
FOBT+. Hemodynamically stable. S/p 1u pRBCs, post-transfusion hgb 8.2.       -GI consulted, appreciate recs    -hold home aspirin and plavix in setting of possible GI bleed   -hold home atenolol     -monitor CBC q12hrs  -c/w IV PPI BID  -clear diet in case pt needs c-scope FOBT+. Hemodynamically stable. S/p 1u pRBCs, post-transfusion hgb 8.2.  suspect hemorrhoidal bleed vs avm vs ?brisk UGIB   s/p 1 u prbc  -GI consulted, appreciate recs    -hold home aspirin and plavix for now, but given CAD, spoke w/ pt's cardiologist who recommends c/w plavix. will f/u GI if OK to restart plavix  -hold home atenolol     -monitor CBC q12hrs, transfuse if hgb<8 given CAD hx  -c/w IV PPI BID for now   -clear diet, adv as tolerates. goal for symptomatic support for now and hold off on cscope

## 2019-10-07 NOTE — PROGRESS NOTE ADULT - PROBLEM SELECTOR PLAN 2
Trops 25 --> 25. EKG NSR. Chest discomfort w/ exertion. Most likely stable angina. Although cannot r/o symptomatic anemia.   -hold atenolol  -continue to monitor for further episodes of CP Trops 25 --> 25. EKG NSR. Chest discomfort w/ exertion. Most likely stable angina. Although cannot r/o symptomatic anemia.   -hold atenolol for now given bleed, restart tomorrow if bp stable  -continue to monitor for further episodes of CP  -d/w pt's cardiologist, as above

## 2019-10-07 NOTE — PROGRESS NOTE ADULT - PROBLEM SELECTOR PLAN 9
meds reconciled w/ pt's son at bedside  -will f/u w/ Dr. Jacobs regarding why pt is on aspirin 325 instead of 81 DVT Prophylaxis: SCDs for now given bleed  Diet: clear liquid diet, adv as tolerates  Dispo: pending PT     #GOC: pt is DNR/DNI. MOLST form in chart, signed

## 2019-10-07 NOTE — PROGRESS NOTE ADULT - PROBLEM SELECTOR PLAN 3
Cr 1.51. S/p 1x IVF  -start NS at 100cc/hr for 10 hrs   -Trend Cr qDaily   -Avoid nephrotoxic drugs Stable Cr since admission. Baseline Cr ~1.1 in 2015.    -Trend Cr qDaily   -Avoid nephrotoxic drugs Stable Cr since admission. Baseline Cr ~1.1 in 2015.    suspect prerenal due to volume loss now s/p prbc  -Trend Cr qDaily   -Avoid nephrotoxic drugs

## 2019-10-07 NOTE — PROGRESS NOTE ADULT - PROBLEM SELECTOR PLAN 7
S/p 5 stents   -hold aspirin S/p 5 stents   on asa/plavix at home  - f/u w/ gi if ok to restart plavix

## 2019-10-07 NOTE — PROGRESS NOTE ADULT - ASSESSMENT
89yoM w/ hx of T2DM, HTN, remote hx of MI, TIA, CAD s/p 5 stents on Plavix and PVD presents w/ 4 ep of BRBPR x 1 day concerning for LGIB vs hemorrhoids. 89yoM w/ hx of T2DM, HTN, remote hx of MI, TIA, CAD s/p 5 stents on Plavix and PVD presents w/ 4 ep of BRBPR x 1 day adm with acute blood loss anemia, s/p PRBC, w/ hosp course by UDAY on CKD.

## 2019-10-07 NOTE — PROGRESS NOTE ADULT - SUBJECTIVE AND OBJECTIVE BOX
***************************************************************  Dalia Javier, PGY1  Internal Medicine   pager: [phone]  ***************************************************************    HOME ADLER  89y  MRN: [id number]    Patient is a 89y old  Male who presents with a chief complaint of Rectal bleeding (06 Oct 2019 18:56)      Overnight Events / Subjective: no events ON. Post transfusion CBC 8.2. Denies fever, CP, SOB, abn pain, N/V, dysuria. Tolerating diet. Has not had a BM since admission.      MEDICATIONS  (STANDING):  atorvastatin 40 milliGRAM(s) Oral at bedtime  cholecalciferol 1000 Unit(s) Oral daily  dextrose 5%. 1000 milliLiter(s) (50 mL/Hr) IV Continuous <Continuous>  dextrose 50% Injectable 12.5 Gram(s) IV Push once  donepezil 10 milliGRAM(s) Oral at bedtime  DULoxetine 30 milliGRAM(s) Oral two times a day  ferrous    sulfate 325 milliGRAM(s) Oral daily  influenza   Vaccine 0.5 milliLiter(s) IntraMuscular once  insulin glargine Injectable (LANTUS) 11 Unit(s) SubCutaneous at bedtime  insulin lispro (HumaLOG) corrective regimen sliding scale   SubCutaneous three times a day before meals  insulin lispro (HumaLOG) corrective regimen sliding scale   SubCutaneous at bedtime  insulin lispro Injectable (HumaLOG) 5 Unit(s) SubCutaneous three times a day before meals  pantoprazole  Injectable 40 milliGRAM(s) IV Push two times a day  phenytoin   Capsule 300 milliGRAM(s) Oral at bedtime    MEDICATIONS  (PRN):  dextrose 40% Gel 15 Gram(s) Oral once PRN Blood Glucose LESS THAN 70 milliGRAM(s)/deciliter  glucagon  Injectable 1 milliGRAM(s) IntraMuscular once PRN Glucose LESS THAN 70 milligrams/deciliter      Objective:    Vitals: Vital Signs Last 24 Hrs  T(C): 36.9 (10-07-19 @ 04:26), Max: 37.4 (10-06-19 @ 19:53)  T(F): 98.4 (10-07-19 @ 04:26), Max: 99.3 (10-06-19 @ 19:53)  HR: 78 (10-07-19 @ 04:26) (73 - 95)  BP: 112/62 (10-07-19 @ 04:26) (103/59 - 132/67)  BP(mean): --  RR: 18 (10-07-19 @ 04:26) (18 - 20)  SpO2: 95% (10-07-19 @ 04:26) (93% - 98%)            I&O's Summary    06 Oct 2019 07:01  -  07 Oct 2019 07:00  --------------------------------------------------------  IN: 0 mL / OUT: 200 mL / NET: -200 mL      PHYSICAL EXAM:  GENERAL: NAD, well-groomed, well-developed elderly gentleman   HEAD:  Atraumatic, Normocephalic  EYES: EOMI, PERRLA, conjunctiva and sclera clear  ENMT: No tonsillar erythema, exudates, or enlargement; Moist mucous membranes, Good dentition, No lesions  CHEST/LUNG: Clear to percussion bilaterally; No rales, rhonchi, wheezing, or rubs  HEART: Regular rate and rhythm; No murmurs, rubs, or gallops  ABDOMEN: Soft, Nontender, Nondistended; Bowel sounds present  VASCULAR:  2+ Peripheral Pulses, No clubbing, cyanosis, or edema  LYMPH: No lymphadenopathy noted  SKIN: No rashes or lesions  NERVOUS SYSTEM:  Alert & Oriented X3, Good concentration       LABS:  10-07    137  |  108  |  49<H>  ----------------------------<  199<H>  5.5<H>   |  22  |  1.51<H>  10-06    133<L>  |  106  |  48<H>  ----------------------------<  237<H>  5.3   |  17<L>  |  1.43<H>    Ca    8.4      07 Oct 2019 05:58  Ca    7.7<L>      06 Oct 2019 02:52  Phos  3.7     10-07  Mg     2.2     10-07    TPro  5.6<L>  /  Alb  3.3  /  TBili  0.2  /  DBili  x   /  AST  75<H>  /  ALT  22  /  AlkPhos  84  10-07  TPro  5.9<L>  /  Alb  3.1<L>  /  TBili  0.1<L>  /  DBili  x   /  AST  15  /  ALT  19  /  AlkPhos  101  10-06      PT/INR - ( 06 Oct 2019 02:52 )   PT: 12.4 sec;   INR: 1.08 ratio         PTT - ( 06 Oct 2019 02:52 )  PTT:20.8 sec                                        8.0    10.21 )-----------( 162      ( 07 Oct 2019 06:24 )             24.2                         8.2    9.14  )-----------( 166      ( 06 Oct 2019 18:59 )             24.9                         7.5    10.34 )-----------( 183      ( 06 Oct 2019 05:13 )             23.3     CAPILLARY BLOOD GLUCOSE      POCT Blood Glucose.: 173 mg/dL (07 Oct 2019 08:19)  POCT Blood Glucose.: 209 mg/dL (07 Oct 2019 00:41)  POCT Blood Glucose.: 225 mg/dL (06 Oct 2019 22:59)  POCT Blood Glucose.: 215 mg/dL (06 Oct 2019 21:27)  POCT Blood Glucose.: 271 mg/dL (06 Oct 2019 16:54)  POCT Blood Glucose.: 358 mg/dL (06 Oct 2019 11:10)      RADIOLOGY & ADDITIONAL TESTS:    Imaging Personally Reviewed:  [x ] YES  [ ] NO    Consultants involved in case:   Consultant(s) Notes Reviewed:  [ x] YES  [ ] NO:   Care Discussed with Consultants/Other Providers [x ] YES  [ ] NO ***************************************************************  Dalia Javier, PGY1  Internal Medicine   pager: [phone] / [phone]  ***************************************************************    HOME ADLER  89y  MRN: [id number]    Patient is a 89y old  Male who presents with a chief complaint of Rectal bleeding (06 Oct 2019 18:56)      Overnight Events / Subjective: no events ON. Post transfusion CBC 8.2. Denies fever, CP, SOB, abn pain, N/V, dysuria. Tolerating diet. Has not had a BM since admission.      Objective:    Vitals: Vital Signs Last 24 Hrs  T(C): 36.9 (10-07-19 @ 04:26), Max: 37.4 (10-06-19 @ 19:53)  T(F): 98.4 (10-07-19 @ 04:26), Max: 99.3 (10-06-19 @ 19:53)  HR: 78 (10-07-19 @ 04:26) (73 - 95)  BP: 112/62 (10-07-19 @ 04:26) (103/59 - 132/67)  BP(mean): --  RR: 18 (10-07-19 @ 04:26) (18 - 20)  SpO2: 95% (10-07-19 @ 04:26) (93% - 98%)            I&O's Summary    06 Oct 2019 07:01  -  07 Oct 2019 07:00  --------------------------------------------------------  IN: 0 mL / OUT: 200 mL / NET: -200 mL      PHYSICAL EXAM:  GENERAL: NAD, well-groomed, well-developed elderly gentleman   HEAD:  Atraumatic, Normocephalic  EYES: EOMI, PERRLA, conjunctiva and sclera clear  ENMT: No tonsillar erythema, exudates, or enlargement; Moist mucous membranes, Good dentition, No lesions  CHEST/LUNG: Clear to percussion bilaterally; No rales, rhonchi, wheezing, or rubs  HEART: Regular rate and rhythm; No murmurs, rubs, or gallops  ABDOMEN: Soft, Nontender, Nondistended; Bowel sounds present  VASCULAR:  2+ Peripheral Pulses, No clubbing, cyanosis, or edema  LYMPH: No lymphadenopathy noted  SKIN: No rashes or lesions  NERVOUS SYSTEM:  Alert & Oriented X3, Good concentration       LABS:  10-07    137  |  108  |  49<H>  ----------------------------<  199<H>  5.5<H>   |  22  |  1.51<H>  10-06    133<L>  |  106  |  48<H>  ----------------------------<  237<H>  5.3   |  17<L>  |  1.43<H>    Ca    8.4      07 Oct 2019 05:58  Ca    7.7<L>      06 Oct 2019 02:52  Phos  3.7     10-07  Mg     2.2     10-07    TPro  5.6<L>  /  Alb  3.3  /  TBili  0.2  /  DBili  x   /  AST  75<H>  /  ALT  22  /  AlkPhos  84  10-07  TPro  5.9<L>  /  Alb  3.1<L>  /  TBili  0.1<L>  /  DBili  x   /  AST  15  /  ALT  19  /  AlkPhos  101  10-06      PT/INR - ( 06 Oct 2019 02:52 )   PT: 12.4 sec;   INR: 1.08 ratio         PTT - ( 06 Oct 2019 02:52 )  PTT:20.8 sec                                        8.0    10.21 )-----------( 162      ( 07 Oct 2019 06:24 )             24.2                         8.2    9.14  )-----------( 166      ( 06 Oct 2019 18:59 )             24.9                         7.5    10.34 )-----------( 183      ( 06 Oct 2019 05:13 )             23.3     CAPILLARY BLOOD GLUCOSE      POCT Blood Glucose.: 173 mg/dL (07 Oct 2019 08:19)  POCT Blood Glucose.: 209 mg/dL (07 Oct 2019 00:41)  POCT Blood Glucose.: 225 mg/dL (06 Oct 2019 22:59)  POCT Blood Glucose.: 215 mg/dL (06 Oct 2019 21:27)  POCT Blood Glucose.: 271 mg/dL (06 Oct 2019 16:54)  POCT Blood Glucose.: 358 mg/dL (06 Oct 2019 11:10)      RADIOLOGY & ADDITIONAL TESTS:    Imaging Personally Reviewed:  [x ] YES  [ ] NO    Consultants involved in case:   Consultant(s) Notes Reviewed:  [ x] YES  [ ] NO:   Care Discussed with Consultants/Other Providers [x ] YES  [ ] NO    MEDICATIONS  (STANDING):  atorvastatin 40 milliGRAM(s) Oral at bedtime  cholecalciferol 1000 Unit(s) Oral daily  dextrose 5%. 1000 milliLiter(s) (50 mL/Hr) IV Continuous <Continuous>  dextrose 50% Injectable 12.5 Gram(s) IV Push once  donepezil 10 milliGRAM(s) Oral at bedtime  DULoxetine 30 milliGRAM(s) Oral two times a day  ferrous    sulfate 325 milliGRAM(s) Oral daily  influenza   Vaccine 0.5 milliLiter(s) IntraMuscular once  insulin glargine Injectable (LANTUS) 14 Unit(s) SubCutaneous at bedtime  insulin lispro (HumaLOG) corrective regimen sliding scale   SubCutaneous three times a day before meals  insulin lispro (HumaLOG) corrective regimen sliding scale   SubCutaneous at bedtime  insulin lispro Injectable (HumaLOG) 6 Unit(s) SubCutaneous three times a day before meals  pantoprazole  Injectable 40 milliGRAM(s) IV Push two times a day  phenytoin   Capsule 300 milliGRAM(s) Oral at bedtime    MEDICATIONS  (PRN):  dextrose 40% Gel 15 Gram(s) Oral once PRN Blood Glucose LESS THAN 70 milliGRAM(s)/deciliter  glucagon  Injectable 1 milliGRAM(s) IntraMuscular once PRN Glucose LESS THAN 70 milligrams/deciliter    Home Medications:  Aricept 10 mg oral tablet: 1 tab(s) orally once a day (at bedtime) (06 Oct 2019 08:37)  aspirin 325 mg oral delayed release tablet: 1 tab(s) orally once a day (06 Oct 2019 08:37)  atenolol 25 mg oral tablet: 0.5 tab(s) orally once a day (06 Oct 2019 08:37)  clopidogrel 75 mg oral tablet: 1 tab(s) orally once a day (06 Oct 2019 08:37)  Crestor 10 mg oral tablet: 1 tab(s) orally once a day (06 Oct 2019 08:39)  Cymbalta 30 mg oral delayed release capsule: 1 cap(s) orally 2 times a day (06 Oct 2019 08:37)  Dilantin 100 mg oral capsule: 300 milligram(s) orally once a day (at bedtime) (06 Oct 2019 08:37)  ferrous sulfate: 325 milligram(s) orally once a day (before a meal) (06 Oct 2019 08:37)  insulin glargine 100 units/mL subcutaneous solution: 22 unit(s) subcutaneous once a day (at bedtime) (06 Oct 2019 08:37)  insulin lispro 100 units/mL subcutaneous solution: 10 unit(s) subcutaneous once a day (before a meal) before dinner (06 Oct 2019 08:37)  insulin lispro 100 units/mL subcutaneous solution: 10 unit(s) subcutaneous once a day (before a meal) before breakfast (06 Oct 2019 08:37)  insulin lispro 100 units/mL subcutaneous solution: 10 unit(s) subcutaneous once a day (before a meal) (06 Oct 2019 08:37)  Vitamin D3 1000 intl units oral tablet: 1 tab(s) orally once a day (06 Oct 2019 08:37)

## 2019-10-08 LAB
ALBUMIN SERPL ELPH-MCNC: 3.2 G/DL — LOW (ref 3.3–5)
ALP SERPL-CCNC: 89 U/L — SIGNIFICANT CHANGE UP (ref 40–120)
ALT FLD-CCNC: 26 U/L — SIGNIFICANT CHANGE UP (ref 10–45)
ANION GAP SERPL CALC-SCNC: 12 MMOL/L — SIGNIFICANT CHANGE UP (ref 5–17)
AST SERPL-CCNC: 49 U/L — HIGH (ref 10–40)
BILIRUB SERPL-MCNC: 0.2 MG/DL — SIGNIFICANT CHANGE UP (ref 0.2–1.2)
BUN SERPL-MCNC: 51 MG/DL — HIGH (ref 7–23)
CALCIUM SERPL-MCNC: 8.5 MG/DL — SIGNIFICANT CHANGE UP (ref 8.4–10.5)
CHLORIDE SERPL-SCNC: 105 MMOL/L — SIGNIFICANT CHANGE UP (ref 96–108)
CO2 SERPL-SCNC: 20 MMOL/L — LOW (ref 22–31)
CREAT SERPL-MCNC: 1.74 MG/DL — HIGH (ref 0.5–1.3)
GLUCOSE BLDC GLUCOMTR-MCNC: 171 MG/DL — HIGH (ref 70–99)
GLUCOSE BLDC GLUCOMTR-MCNC: 196 MG/DL — HIGH (ref 70–99)
GLUCOSE BLDC GLUCOMTR-MCNC: 199 MG/DL — HIGH (ref 70–99)
GLUCOSE BLDC GLUCOMTR-MCNC: 247 MG/DL — HIGH (ref 70–99)
GLUCOSE SERPL-MCNC: 190 MG/DL — HIGH (ref 70–99)
HCT VFR BLD CALC: 23.7 % — LOW (ref 39–50)
HGB BLD-MCNC: 7.6 G/DL — LOW (ref 13–17)
MAGNESIUM SERPL-MCNC: 2.1 MG/DL — SIGNIFICANT CHANGE UP (ref 1.6–2.6)
MCHC RBC-ENTMCNC: 32.1 GM/DL — SIGNIFICANT CHANGE UP (ref 32–36)
MCHC RBC-ENTMCNC: 34.1 PG — HIGH (ref 27–34)
MCV RBC AUTO: 106.3 FL — HIGH (ref 80–100)
PHOSPHATE SERPL-MCNC: 3.3 MG/DL — SIGNIFICANT CHANGE UP (ref 2.5–4.5)
PLATELET # BLD AUTO: 150 K/UL — SIGNIFICANT CHANGE UP (ref 150–400)
POTASSIUM SERPL-MCNC: 5.4 MMOL/L — HIGH (ref 3.5–5.3)
POTASSIUM SERPL-SCNC: 5.4 MMOL/L — HIGH (ref 3.5–5.3)
PROT SERPL-MCNC: 5.7 G/DL — LOW (ref 6–8.3)
RBC # BLD: 2.23 M/UL — LOW (ref 4.2–5.8)
RBC # FLD: 16.6 % — HIGH (ref 10.3–14.5)
SODIUM SERPL-SCNC: 137 MMOL/L — SIGNIFICANT CHANGE UP (ref 135–145)
WBC # BLD: 7.61 K/UL — SIGNIFICANT CHANGE UP (ref 3.8–10.5)
WBC # FLD AUTO: 7.61 K/UL — SIGNIFICANT CHANGE UP (ref 3.8–10.5)

## 2019-10-08 PROCEDURE — 99233 SBSQ HOSP IP/OBS HIGH 50: CPT | Mod: GC

## 2019-10-08 PROCEDURE — 99232 SBSQ HOSP IP/OBS MODERATE 35: CPT | Mod: GC

## 2019-10-08 RX ORDER — INSULIN LISPRO 100/ML
7 VIAL (ML) SUBCUTANEOUS
Refills: 0 | Status: DISCONTINUED | OUTPATIENT
Start: 2019-10-08 | End: 2019-10-08

## 2019-10-08 RX ORDER — PANTOPRAZOLE SODIUM 20 MG/1
40 TABLET, DELAYED RELEASE ORAL
Refills: 0 | Status: DISCONTINUED | OUTPATIENT
Start: 2019-10-08 | End: 2019-10-09

## 2019-10-08 RX ORDER — INSULIN LISPRO 100/ML
6 VIAL (ML) SUBCUTANEOUS
Refills: 0 | Status: DISCONTINUED | OUTPATIENT
Start: 2019-10-08 | End: 2019-10-08

## 2019-10-08 RX ORDER — INSULIN GLARGINE 100 [IU]/ML
17 INJECTION, SOLUTION SUBCUTANEOUS AT BEDTIME
Refills: 0 | Status: DISCONTINUED | OUTPATIENT
Start: 2019-10-08 | End: 2019-10-08

## 2019-10-08 RX ORDER — INSULIN LISPRO 100/ML
6 VIAL (ML) SUBCUTANEOUS
Refills: 0 | Status: DISCONTINUED | OUTPATIENT
Start: 2019-10-08 | End: 2019-10-09

## 2019-10-08 RX ORDER — CLOPIDOGREL BISULFATE 75 MG/1
75 TABLET, FILM COATED ORAL DAILY
Refills: 0 | Status: DISCONTINUED | OUTPATIENT
Start: 2019-10-08 | End: 2019-10-09

## 2019-10-08 RX ORDER — INSULIN GLARGINE 100 [IU]/ML
14 INJECTION, SOLUTION SUBCUTANEOUS AT BEDTIME
Refills: 0 | Status: DISCONTINUED | OUTPATIENT
Start: 2019-10-08 | End: 2019-10-09

## 2019-10-08 RX ADMIN — Medication 1: at 17:19

## 2019-10-08 RX ADMIN — DONEPEZIL HYDROCHLORIDE 10 MILLIGRAM(S): 10 TABLET, FILM COATED ORAL at 21:32

## 2019-10-08 RX ADMIN — Medication 1: at 12:17

## 2019-10-08 RX ADMIN — Medication 6 UNIT(S): at 17:18

## 2019-10-08 RX ADMIN — Medication 1000 UNIT(S): at 10:24

## 2019-10-08 RX ADMIN — Medication 2: at 08:25

## 2019-10-08 RX ADMIN — Medication 6 UNIT(S): at 12:17

## 2019-10-08 RX ADMIN — Medication 325 MILLIGRAM(S): at 10:24

## 2019-10-08 RX ADMIN — DULOXETINE HYDROCHLORIDE 30 MILLIGRAM(S): 30 CAPSULE, DELAYED RELEASE ORAL at 05:29

## 2019-10-08 RX ADMIN — DULOXETINE HYDROCHLORIDE 30 MILLIGRAM(S): 30 CAPSULE, DELAYED RELEASE ORAL at 17:19

## 2019-10-08 RX ADMIN — ATORVASTATIN CALCIUM 40 MILLIGRAM(S): 80 TABLET, FILM COATED ORAL at 21:32

## 2019-10-08 RX ADMIN — INSULIN GLARGINE 14 UNIT(S): 100 INJECTION, SOLUTION SUBCUTANEOUS at 21:42

## 2019-10-08 RX ADMIN — CLOPIDOGREL BISULFATE 75 MILLIGRAM(S): 75 TABLET, FILM COATED ORAL at 10:24

## 2019-10-08 RX ADMIN — Medication 300 MILLIGRAM(S): at 21:32

## 2019-10-08 RX ADMIN — Medication 6 UNIT(S): at 08:24

## 2019-10-08 RX ADMIN — PANTOPRAZOLE SODIUM 40 MILLIGRAM(S): 20 TABLET, DELAYED RELEASE ORAL at 05:29

## 2019-10-08 NOTE — PROGRESS NOTE ADULT - ASSESSMENT
Impression:  # Hematochezia likely due to Hemorrhoidal bleed or diverticular bleed that has stopped.  # Acute blood loss anemia - stable now over the last 12 hours   # CAD  # PAD  # DM, HTN    Recommendations:  - Okay to resume Plavix  - Okay to advance diet to regular.  - If his hgb is continued to be stable tomorrow and has no bleeding episodes.  Okay to be discharged from GI standpoint   - PPI 40 mg PO daily . Impression:  # Hematochezia likely due to Hemorrhoidal bleed or diverticular bleed that has stopped.  # Acute blood loss anemia - stable now over the last 12 hours   # CAD  # PAD  # DM, HTN    Recommendations:  - Okay to resume Plavix  - Okay to advance diet to regular.  - Consider to keep hgb between 8-9 - giving his chronic angina and history of CAD  - If his hgb is continued to be stable tomorrow and has no bleeding episodes.  Okay to be discharged from GI standpoint   - PPI 40 mg PO daily .  - Call us back with questions. Impression:  # Hematochezia likely due to Hemorrhoidal bleed or diverticular bleed that has stopped.  # Acute blood loss anemia - stable now over the last 12 hours   # CAD  # PAD  # DM, HTN    Recommendations:  - Okay to resume Plavix  - Okay to advance diet to regular.  - Consider to keep hgb between 8-9 - giving his chronic angina and history of CAD  - If his hgb is continued to be stable tomorrow and has no bleeding episodes.  Okay to be discharged from GI standpoint as he does not want to pursue colonoscopy.  - PPI 40 mg PO daily .  - Call us back with questions.

## 2019-10-08 NOTE — PROGRESS NOTE ADULT - PROBLEM SELECTOR PLAN 2
Trops 25 --> 25. EKG NSR. Chest discomfort w/ exertion. Most likely stable angina. Although cannot r/o symptomatic anemia.   -hold atenolol for now given bleed, restart tomorrow if bp stable  -continue to monitor for further episodes of CP  -d/w pt's cardiologist, as above Trops 25 --> 25. EKG NSR. Chest discomfort w/ exertion. Most likely stable angina. -resolved   -hold atenolol for now given bleed, restart tomorrow if bp stable  -continue to monitor for further episodes of CP  -d/w pt's cardiologist, as above Trops 25 --> 25. EKG NSR. Chest discomfort w/ exertion. Most likely stable angina. Resolved   -hold atenolol for now given bleed, restart tomorrow if bp stable  -continue to monitor for further episodes of CP  -d/w pt's cardiologist, as above Trops 25 --> 25. EKG NSR. Chest discomfort w/ exertion.   Most likely stable angina. Resolved   -hold atenolol for now given bleed, restart tomorrow if bp stable  -continue to monitor for further episodes of CP  -d/w pt's cardiologist, as above

## 2019-10-08 NOTE — PHYSICAL THERAPY INITIAL EVALUATION ADULT - PERTINENT HX OF CURRENT PROBLEM, REHAB EVAL
89yoM w/ hx of T2DM, HTN, remote hx of MI, TIA, CAD s/p 5 stents on Plavix and PVD presents w/ 4 ep of BRBPR x 1 day. Patient wears a diaper at baseline and reports 4 ep of BRBPR while using the toilet. The first ep was reported as maroon in color contd below:

## 2019-10-08 NOTE — PROGRESS NOTE ADULT - ASSESSMENT
89yoM w/ hx of T2DM, HTN, remote hx of MI, TIA, CAD s/p 5 stents on Plavix and PVD presents w/ 4 ep of BRBPR x 1 day adm with acute blood loss anemia, s/p PRBC, w/ hosp course by UDAY on CKD.

## 2019-10-08 NOTE — PROGRESS NOTE ADULT - SUBJECTIVE AND OBJECTIVE BOX
***************************************************************  Dalia Javier, PGY1  Internal Medicine   pager: [phone]  ***************************************************************    HOME ADLER  89y  MRN: [id number]    Patient is a 89y old  Male who presents with a chief complaint of Rectal bleeding (07 Oct 2019 09:01)      Overnight Events / Subjective: no events ON. Denies fever, CP, SOB, abn pain, N/V, dysuria. Tolerating diet.      MEDICATIONS  (STANDING):  atorvastatin 40 milliGRAM(s) Oral at bedtime  cholecalciferol 1000 Unit(s) Oral daily  dextrose 5%. 1000 milliLiter(s) (50 mL/Hr) IV Continuous <Continuous>  dextrose 50% Injectable 12.5 Gram(s) IV Push once  donepezil 10 milliGRAM(s) Oral at bedtime  DULoxetine 30 milliGRAM(s) Oral two times a day  ferrous    sulfate 325 milliGRAM(s) Oral daily  influenza   Vaccine 0.5 milliLiter(s) IntraMuscular once  insulin glargine Injectable (LANTUS) 14 Unit(s) SubCutaneous at bedtime  insulin lispro (HumaLOG) corrective regimen sliding scale   SubCutaneous three times a day before meals  insulin lispro (HumaLOG) corrective regimen sliding scale   SubCutaneous at bedtime  insulin lispro Injectable (HumaLOG) 6 Unit(s) SubCutaneous three times a day before meals  pantoprazole  Injectable 40 milliGRAM(s) IV Push two times a day  phenytoin   Capsule 300 milliGRAM(s) Oral at bedtime    MEDICATIONS  (PRN):  dextrose 40% Gel 15 Gram(s) Oral once PRN Blood Glucose LESS THAN 70 milliGRAM(s)/deciliter  glucagon  Injectable 1 milliGRAM(s) IntraMuscular once PRN Glucose LESS THAN 70 milligrams/deciliter      Objective:    Vitals: Vital Signs Last 24 Hrs  T(C): 37.2 (10-08-19 @ 04:41), Max: 37.2 (10-08-19 @ 04:41)  T(F): 98.9 (10-08-19 @ 04:41), Max: 98.9 (10-08-19 @ 04:41)  HR: 83 (10-08-19 @ 04:41) (77 - 84)  BP: 101/57 (10-08-19 @ 04:41) (101/56 - 125/71)  BP(mean): --  RR: 18 (10-08-19 @ 04:41) (18 - 18)  SpO2: 95% (10-08-19 @ 04:41) (95% - 98%)            I&O's Summary    07 Oct 2019 07:01  -  08 Oct 2019 07:00  --------------------------------------------------------  IN: 960 mL / OUT: 1340 mL / NET: -380 mL        PHYSICAL EXAM:  GENERAL: NAD  HEAD:  Atraumatic, Normocephalic  EYES: EOMI, conjunctiva and sclera clear  CHEST/LUNG: Clear to percussion bilaterally; No rales, rhonchi, wheezing, or rubs  HEART: Regular rate and rhythm; No murmurs, rubs, or gallops  ABDOMEN: Soft, Nontender, Nondistended;   SKIN: No rashes or lesions  NERVOUS SYSTEM:  Alert & Oriented X3, no focal deficit    LABS:  10-07    137  |  107  |  50<H>  ----------------------------<  187<H>  5.2   |  22  |  1.51<H>  10-07    137  |  108  |  49<H>  ----------------------------<  199<H>  5.5<H>   |  22  |  1.51<H>  10-06    133<L>  |  106  |  48<H>  ----------------------------<  237<H>  5.3   |  17<L>  |  1.43<H>    Ca    8.2<L>      07 Oct 2019 08:59  Ca    8.4      07 Oct 2019 05:58  Ca    7.7<L>      06 Oct 2019 02:52  Phos  3.7     10-07  Mg     2.2     10-07    TPro  5.6<L>  /  Alb  3.3  /  TBili  0.2  /  DBili  x   /  AST  75<H>  /  ALT  22  /  AlkPhos  84  10-07  TPro  5.9<L>  /  Alb  3.1<L>  /  TBili  0.1<L>  /  DBili  x   /  AST  15  /  ALT  19  /  AlkPhos  101  10-06                                              7.7    8.25  )-----------( 161      ( 07 Oct 2019 20:54 )             24.2                         8.0    10.21 )-----------( 162      ( 07 Oct 2019 06:24 )             24.2                         8.2    9.14  )-----------( 166      ( 06 Oct 2019 18:59 )             24.9     CAPILLARY BLOOD GLUCOSE      POCT Blood Glucose.: 247 mg/dL (08 Oct 2019 07:30)  POCT Blood Glucose.: 227 mg/dL (07 Oct 2019 21:14)  POCT Blood Glucose.: 125 mg/dL (07 Oct 2019 17:46)  POCT Blood Glucose.: 67 mg/dL (07 Oct 2019 16:56)  POCT Blood Glucose.: 69 mg/dL (07 Oct 2019 16:54)  POCT Blood Glucose.: 357 mg/dL (07 Oct 2019 11:57)  POCT Blood Glucose.: 173 mg/dL (07 Oct 2019 08:19)      RADIOLOGY & ADDITIONAL TESTS:    Imaging Personally Reviewed:  [x ] YES  [ ] NO    Consultants involved in case:   Consultant(s) Notes Reviewed:  [ x] YES  [ ] NO:   Care Discussed with Consultants/Other Providers [x ] YES  [ ] NO ***************************************************************  Dalia Javier, PGY1  Internal Medicine   pager: [phone] / [phone]  ***************************************************************    HOME ADLER  89y  MRN: [id number]    Patient is a 89y old  Male who presents with a chief complaint of Rectal bleeding (07 Oct 2019 09:01)      Overnight Events / Subjective: no events ON. No BMs since admission. Denies melena, hemopytsis, hematochezia, hemoptysis. Denies fever, CP, SOB, abn pain, N/V, dysuria. Tolerating liquid diet.      MEDICATIONS  (STANDING):  atorvastatin 40 milliGRAM(s) Oral at bedtime  cholecalciferol 1000 Unit(s) Oral daily  dextrose 5%. 1000 milliLiter(s) (50 mL/Hr) IV Continuous <Continuous>  dextrose 50% Injectable 12.5 Gram(s) IV Push once  donepezil 10 milliGRAM(s) Oral at bedtime  DULoxetine 30 milliGRAM(s) Oral two times a day  ferrous    sulfate 325 milliGRAM(s) Oral daily  influenza   Vaccine 0.5 milliLiter(s) IntraMuscular once  insulin glargine Injectable (LANTUS) 14 Unit(s) SubCutaneous at bedtime  insulin lispro (HumaLOG) corrective regimen sliding scale   SubCutaneous three times a day before meals  insulin lispro (HumaLOG) corrective regimen sliding scale   SubCutaneous at bedtime  insulin lispro Injectable (HumaLOG) 6 Unit(s) SubCutaneous three times a day before meals  pantoprazole  Injectable 40 milliGRAM(s) IV Push two times a day  phenytoin   Capsule 300 milliGRAM(s) Oral at bedtime    MEDICATIONS  (PRN):  dextrose 40% Gel 15 Gram(s) Oral once PRN Blood Glucose LESS THAN 70 milliGRAM(s)/deciliter  glucagon  Injectable 1 milliGRAM(s) IntraMuscular once PRN Glucose LESS THAN 70 milligrams/deciliter      Objective:    Vitals: Vital Signs Last 24 Hrs  T(C): 37.2 (10-08-19 @ 04:41), Max: 37.2 (10-08-19 @ 04:41)  T(F): 98.9 (10-08-19 @ 04:41), Max: 98.9 (10-08-19 @ 04:41)  HR: 83 (10-08-19 @ 04:41) (77 - 84)  BP: 101/57 (10-08-19 @ 04:41) (101/56 - 125/71)  BP(mean): --  RR: 18 (10-08-19 @ 04:41) (18 - 18)  SpO2: 95% (10-08-19 @ 04:41) (95% - 98%)            I&O's Summary    07 Oct 2019 07:01  -  08 Oct 2019 07:00  --------------------------------------------------------  IN: 960 mL / OUT: 1340 mL / NET: -380 mL        PHYSICAL EXAM:  GENERAL: NAD, well-groomed, well-developed elderly gentleman   HEAD:  Atraumatic, Normocephalic  EYES: EOMI, PERRLA, conjunctiva and sclera clear  ENMT: No tonsillar erythema, exudates, or enlargement; Moist mucous membranes, Good dentition, No lesions  CHEST/LUNG: Clear to percussion bilaterally; No rales, rhonchi, wheezing, or rubs  HEART: Regular rate and rhythm; No murmurs, rubs, or gallops  ABDOMEN: Soft, Nontender, Nondistended; Bowel sounds present  VASCULAR:  2+ Peripheral Pulses, No clubbing, cyanosis, or edema  LYMPH: No lymphadenopathy noted  SKIN: No rashes or lesions  NERVOUS SYSTEM:  Alert & Oriented X3, Good concentration     LABS:  10-07    137  |  107  |  50<H>  ----------------------------<  187<H>  5.2   |  22  |  1.51<H>  10-07    137  |  108  |  49<H>  ----------------------------<  199<H>  5.5<H>   |  22  |  1.51<H>  10-06    133<L>  |  106  |  48<H>  ----------------------------<  237<H>  5.3   |  17<L>  |  1.43<H>    Ca    8.2<L>      07 Oct 2019 08:59  Ca    8.4      07 Oct 2019 05:58  Ca    7.7<L>      06 Oct 2019 02:52  Phos  3.7     10-07  Mg     2.2     10-07    TPro  5.6<L>  /  Alb  3.3  /  TBili  0.2  /  DBili  x   /  AST  75<H>  /  ALT  22  /  AlkPhos  84  10-07  TPro  5.9<L>  /  Alb  3.1<L>  /  TBili  0.1<L>  /  DBili  x   /  AST  15  /  ALT  19  /  AlkPhos  101  10-06                                              7.7    8.25  )-----------( 161      ( 07 Oct 2019 20:54 )             24.2                         8.0    10.21 )-----------( 162      ( 07 Oct 2019 06:24 )             24.2                         8.2    9.14  )-----------( 166      ( 06 Oct 2019 18:59 )             24.9     CAPILLARY BLOOD GLUCOSE      POCT Blood Glucose.: 247 mg/dL (08 Oct 2019 07:30)  POCT Blood Glucose.: 227 mg/dL (07 Oct 2019 21:14)  POCT Blood Glucose.: 125 mg/dL (07 Oct 2019 17:46)  POCT Blood Glucose.: 67 mg/dL (07 Oct 2019 16:56)  POCT Blood Glucose.: 69 mg/dL (07 Oct 2019 16:54)  POCT Blood Glucose.: 357 mg/dL (07 Oct 2019 11:57)  POCT Blood Glucose.: 173 mg/dL (07 Oct 2019 08:19)      RADIOLOGY & ADDITIONAL TESTS:    Imaging Personally Reviewed:  [x ] YES  [ ] NO    Consultants involved in case:   Consultant(s) Notes Reviewed:  [ x] YES  [ ] NO:   Care Discussed with Consultants/Other Providers [x ] YES  [ ] NO ***************************************************************  Dalia Javier, PGY1  Internal Medicine   pager: [phone] / [phone]  ***************************************************************    HOME ADLER  89y  MRN: [id number]    Patient is a 89y old  Male who presents with a chief complaint of Rectal bleeding (07 Oct 2019 09:01)      Overnight Events / Subjective: no events ON. No BMs since admission. Denies melena, hemopytsis, hematochezia, hemoptysis. Denies fever, CP, SOB, abn pain, N/V, dysuria. Tolerating liquid diet.      MEDICATIONS  (STANDING):  atorvastatin 40 milliGRAM(s) Oral at bedtime  cholecalciferol 1000 Unit(s) Oral daily  dextrose 5%. 1000 milliLiter(s) (50 mL/Hr) IV Continuous <Continuous>  dextrose 50% Injectable 12.5 Gram(s) IV Push once  donepezil 10 milliGRAM(s) Oral at bedtime  DULoxetine 30 milliGRAM(s) Oral two times a day  ferrous    sulfate 325 milliGRAM(s) Oral daily  influenza   Vaccine 0.5 milliLiter(s) IntraMuscular once  insulin glargine Injectable (LANTUS) 14 Unit(s) SubCutaneous at bedtime  insulin lispro (HumaLOG) corrective regimen sliding scale   SubCutaneous three times a day before meals  insulin lispro (HumaLOG) corrective regimen sliding scale   SubCutaneous at bedtime  insulin lispro Injectable (HumaLOG) 6 Unit(s) SubCutaneous three times a day before meals  pantoprazole  Injectable 40 milliGRAM(s) IV Push two times a day  phenytoin   Capsule 300 milliGRAM(s) Oral at bedtime    MEDICATIONS  (PRN):  dextrose 40% Gel 15 Gram(s) Oral once PRN Blood Glucose LESS THAN 70 milliGRAM(s)/deciliter  glucagon  Injectable 1 milliGRAM(s) IntraMuscular once PRN Glucose LESS THAN 70 milligrams/deciliter      Objective:    Vitals: Vital Signs Last 24 Hrs  T(C): 37.2 (10-08-19 @ 04:41), Max: 37.2 (10-08-19 @ 04:41)  T(F): 98.9 (10-08-19 @ 04:41), Max: 98.9 (10-08-19 @ 04:41)  HR: 83 (10-08-19 @ 04:41) (77 - 84)  BP: 101/57 (10-08-19 @ 04:41) (101/56 - 125/71)  BP(mean): --  RR: 18 (10-08-19 @ 04:41) (18 - 18)  SpO2: 95% (10-08-19 @ 04:41) (95% - 98%)            I&O's Summary    07 Oct 2019 07:01  -  08 Oct 2019 07:00  --------------------------------------------------------  IN: 960 mL / OUT: 1340 mL / NET: -380 mL        PHYSICAL EXAM:  GENERAL: NAD, well-groomed, well-developed elderly gentleman   HEAD:  Atraumatic, Normocephalic  EYES: EOMI, conjunctiva and sclera clear  ENMT: No tonsillar erythema, exudates, or enlargement; Moist mucous membranes, Good dentition, No lesions  CHEST/LUNG: Clear to percussion bilaterally; No rales, rhonchi, wheezing, or rubs  HEART: Regular rate and rhythm; No murmurs, rubs, or gallops  ABDOMEN: Soft, Nontender, Nondistended; Bowel sounds present  VASCULAR:  2+ Peripheral Pulses, No clubbing, cyanosis, or edema  LYMPH: No lymphadenopathy noted  SKIN: No rashes or lesions  NERVOUS SYSTEM:  Alert & Oriented X3, Good concentration     LABS:  10-07    137  |  107  |  50<H>  ----------------------------<  187<H>  5.2   |  22  |  1.51<H>  10-07    137  |  108  |  49<H>  ----------------------------<  199<H>  5.5<H>   |  22  |  1.51<H>  10-06    133<L>  |  106  |  48<H>  ----------------------------<  237<H>  5.3   |  17<L>  |  1.43<H>    Ca    8.2<L>      07 Oct 2019 08:59  Ca    8.4      07 Oct 2019 05:58  Ca    7.7<L>      06 Oct 2019 02:52  Phos  3.7     10-07  Mg     2.2     10-07    TPro  5.6<L>  /  Alb  3.3  /  TBili  0.2  /  DBili  x   /  AST  75<H>  /  ALT  22  /  AlkPhos  84  10-07  TPro  5.9<L>  /  Alb  3.1<L>  /  TBili  0.1<L>  /  DBili  x   /  AST  15  /  ALT  19  /  AlkPhos  101  10-06                                              7.7    8.25  )-----------( 161      ( 07 Oct 2019 20:54 )             24.2                         8.0    10.21 )-----------( 162      ( 07 Oct 2019 06:24 )             24.2                         8.2    9.14  )-----------( 166      ( 06 Oct 2019 18:59 )             24.9     CAPILLARY BLOOD GLUCOSE      POCT Blood Glucose.: 247 mg/dL (08 Oct 2019 07:30)  POCT Blood Glucose.: 227 mg/dL (07 Oct 2019 21:14)  POCT Blood Glucose.: 125 mg/dL (07 Oct 2019 17:46)  POCT Blood Glucose.: 67 mg/dL (07 Oct 2019 16:56)  POCT Blood Glucose.: 69 mg/dL (07 Oct 2019 16:54)  POCT Blood Glucose.: 357 mg/dL (07 Oct 2019 11:57)  POCT Blood Glucose.: 173 mg/dL (07 Oct 2019 08:19)      RADIOLOGY & ADDITIONAL TESTS:    Imaging Personally Reviewed:  [x ] YES  [ ] NO    Consultants involved in case:   Consultant(s) Notes Reviewed:  [ x] YES  [ ] NO:   Care Discussed with Consultants/Other Providers [x ] YES  [ ] NO

## 2019-10-08 NOTE — PHYSICAL THERAPY INITIAL EVALUATION ADULT - IMPAIRMENTS CONTRIBUTING TO GAIT DEVIATIONS, PT EVAL
dec heel strike, forward flexexd posture/decreased flexibility/decreased strength/narrow base of support

## 2019-10-08 NOTE — PHYSICAL THERAPY INITIAL EVALUATION ADULT - DISCHARGE DISPOSITION, PT EVAL
home w/ home PT/for balance, gait and strengthening, assistance for functional activities, pt has HHA 10h/7d, pt owns rolling walker

## 2019-10-08 NOTE — PHYSICAL THERAPY INITIAL EVALUATION ADULT - CRITERIA FOR SKILLED THERAPEUTIC INTERVENTIONS
predicted duration of therapy intervention/anticipated equipment needs at discharge/impairments found/anticipated discharge recommendation/functional limitations in following categories/risk reduction/prevention

## 2019-10-08 NOTE — PROGRESS NOTE ADULT - PROBLEM SELECTOR PLAN 4
Home dose of lantus 22U, humalog 10U before meals. A1C 6.7.   -required 6U sliding scale yesterday.   -increase bedtime lantus to 14U and humalog to 6U before meals.  -c/w low dose ISS   -fingerstick before meals and before bed Home dose of lantus 22U, humalog 10U before meals. A1C 6.7.   -c/w bedtime lantus to 14U and humalog to 6U before meals.  -c/w low dose ISS   -fingerstick before meals and before bed

## 2019-10-08 NOTE — PROGRESS NOTE ADULT - PROBLEM SELECTOR PLAN 9
DVT Prophylaxis: SCDs for now given bleed  Diet: clear liquid diet, adv as tolerates  Dispo: pending PT     #GOC: pt is DNR/DNI. MOLST form in chart, signed DVT Prophylaxis: SCDs for now given bleed  Diet: consistent carb diet  Dispo: pending PT.     #GOC: pt is DNR/DNI. MOLST form in chart, signed

## 2019-10-08 NOTE — PROGRESS NOTE ADULT - SUBJECTIVE AND OBJECTIVE BOX
Interval Events:   No further episodes of brbpr  stable h&h    Allergies:  No Known Allergies      Hospital Medications:  atorvastatin 40 milliGRAM(s) Oral at bedtime  cholecalciferol 1000 Unit(s) Oral daily  clopidogrel Tablet 75 milliGRAM(s) Oral daily  dextrose 40% Gel 15 Gram(s) Oral once PRN  dextrose 5%. 1000 milliLiter(s) IV Continuous <Continuous>  dextrose 50% Injectable 12.5 Gram(s) IV Push once  donepezil 10 milliGRAM(s) Oral at bedtime  DULoxetine 30 milliGRAM(s) Oral two times a day  ferrous    sulfate 325 milliGRAM(s) Oral daily  glucagon  Injectable 1 milliGRAM(s) IntraMuscular once PRN  influenza   Vaccine 0.5 milliLiter(s) IntraMuscular once  insulin glargine Injectable (LANTUS) 14 Unit(s) SubCutaneous at bedtime  insulin lispro (HumaLOG) corrective regimen sliding scale   SubCutaneous three times a day before meals  insulin lispro (HumaLOG) corrective regimen sliding scale   SubCutaneous at bedtime  insulin lispro Injectable (HumaLOG) 6 Unit(s) SubCutaneous three times a day with meals  pantoprazole  Injectable 40 milliGRAM(s) IV Push two times a day  phenytoin   Capsule 300 milliGRAM(s) Oral at bedtime      PMHX/PSHX:  OM (osteomyelitis)  PVD (peripheral vascular disease)  CAD (Coronary Artery Disease)  HTN (Hypertension)  HTN (Hypertension)  DM (Diabetes Mellitus)  History of total hip arthroplasty  S/P carotid endarterectomy      ROS:   General:  No fevers, chills or night sweats  ENT:  No sore throat or dysphagia  CV:  No pain or palpitations  Resp:  No dyspnea, cough or  wheezing  GI:  as above  Skin:  No rash or edema    PHYSICAL EXAM:   Vital Signs:  Vital Signs Last 24 Hrs  T(C): 37.2 (08 Oct 2019 04:41), Max: 37.2 (08 Oct 2019 04:41)  T(F): 98.9 (08 Oct 2019 04:41), Max: 98.9 (08 Oct 2019 04:41)  HR: 83 (08 Oct 2019 04:41) (77 - 84)  BP: 101/57 (08 Oct 2019 04:41) (101/57 - 125/71)  BP(mean): --  RR: 18 (08 Oct 2019 04:41) (18 - 18)  SpO2: 95% (08 Oct 2019 04:41) (95% - 98%)  Daily     Daily     GENERAL:  NAD, Appears stated age  HEENT:  NC/AT,  conjunctivae clear and pink, sclera -anicteric  CHEST:  Normal Effort, Breath sounds clear  HEART:  RRR, S1 + S2, no murmurs  ABDOMEN:  Soft, non-tender, non-distended, normoactive bowel sounds,  no masses  EXTREMITIES:  no cyanosis or edema  SKIN:  Warm & Dry. No rash or erythema  NEURO:  Alert, oriented    LABS:                        7.6    7.61  )-----------( 150      ( 08 Oct 2019 09:55 )             23.7     Mean Cell Volume: 106.3 fl (10-08-19 @ 09:55)    10-08    137  |  105  |  51<H>  ----------------------------<  190<H>  5.4<H>   |  20<L>  |  1.74<H>    Ca    8.5      08 Oct 2019 07:13  Phos  3.3     10-08  Mg     2.1     10-08    TPro  5.7<L>  /  Alb  3.2<L>  /  TBili  0.2  /  DBili  x   /  AST  49<H>  /  ALT  26  /  AlkPhos  89  10-08    LIVER FUNCTIONS - ( 08 Oct 2019 07:13 )  Alb: 3.2 g/dL / Pro: 5.7 g/dL / ALK PHOS: 89 U/L / ALT: 26 U/L / AST: 49 U/L / GGT: x                                       7.6    7.61  )-----------( 150      ( 08 Oct 2019 09:55 )             23.7                         7.7    8.25  )-----------( 161      ( 07 Oct 2019 20:54 )             24.2                         8.0    10.21 )-----------( 162      ( 07 Oct 2019 06:24 )             24.2                         8.2    9.14  )-----------( 166      ( 06 Oct 2019 18:59 )             24.9                         7.5    10.34 )-----------( 183      ( 06 Oct 2019 05:13 )             23.3       Imaging: Interval Events:   No further episodes of brbpr  stable h&h  Feeling well, no abdominal pain, tolerating PO.    Allergies:  No Known Allergies      Hospital Medications:  atorvastatin 40 milliGRAM(s) Oral at bedtime  cholecalciferol 1000 Unit(s) Oral daily  clopidogrel Tablet 75 milliGRAM(s) Oral daily  dextrose 40% Gel 15 Gram(s) Oral once PRN  dextrose 5%. 1000 milliLiter(s) IV Continuous <Continuous>  dextrose 50% Injectable 12.5 Gram(s) IV Push once  donepezil 10 milliGRAM(s) Oral at bedtime  DULoxetine 30 milliGRAM(s) Oral two times a day  ferrous    sulfate 325 milliGRAM(s) Oral daily  glucagon  Injectable 1 milliGRAM(s) IntraMuscular once PRN  influenza   Vaccine 0.5 milliLiter(s) IntraMuscular once  insulin glargine Injectable (LANTUS) 14 Unit(s) SubCutaneous at bedtime  insulin lispro (HumaLOG) corrective regimen sliding scale   SubCutaneous three times a day before meals  insulin lispro (HumaLOG) corrective regimen sliding scale   SubCutaneous at bedtime  insulin lispro Injectable (HumaLOG) 6 Unit(s) SubCutaneous three times a day with meals  pantoprazole  Injectable 40 milliGRAM(s) IV Push two times a day  phenytoin   Capsule 300 milliGRAM(s) Oral at bedtime      PMHX/PSHX:  OM (osteomyelitis)  PVD (peripheral vascular disease)  CAD (Coronary Artery Disease)  HTN (Hypertension)  HTN (Hypertension)  DM (Diabetes Mellitus)  History of total hip arthroplasty  S/P carotid endarterectomy      ROS:   General:  No fevers, chills or night sweats  ENT:  No sore throat or dysphagia  CV:  No pain or palpitations  Resp:  No dyspnea, cough or  wheezing  GI:  as above  Skin:  No rash or edema    PHYSICAL EXAM:   Vital Signs:  Vital Signs Last 24 Hrs  T(C): 37.2 (08 Oct 2019 04:41), Max: 37.2 (08 Oct 2019 04:41)  T(F): 98.9 (08 Oct 2019 04:41), Max: 98.9 (08 Oct 2019 04:41)  HR: 83 (08 Oct 2019 04:41) (77 - 84)  BP: 101/57 (08 Oct 2019 04:41) (101/57 - 125/71)  BP(mean): --  RR: 18 (08 Oct 2019 04:41) (18 - 18)  SpO2: 95% (08 Oct 2019 04:41) (95% - 98%)  Daily     Daily     GENERAL:  NAD, Appears stated age  HEENT:  NC/AT,  conjunctivae clear and pink, sclera -anicteric  CHEST:  Normal Effort, Breath sounds clear  HEART:  RRR, S1 + S2, no murmurs  ABDOMEN:  Soft, non-tender, non-distended, normoactive bowel sounds,  no masses  EXTREMITIES:  no cyanosis or edema  SKIN:  Warm & Dry. No rash or erythema  NEURO:  Alert, oriented    LABS:                        7.6    7.61  )-----------( 150      ( 08 Oct 2019 09:55 )             23.7     Mean Cell Volume: 106.3 fl (10-08-19 @ 09:55)    10-08    137  |  105  |  51<H>  ----------------------------<  190<H>  5.4<H>   |  20<L>  |  1.74<H>    Ca    8.5      08 Oct 2019 07:13  Phos  3.3     10-08  Mg     2.1     10-08    TPro  5.7<L>  /  Alb  3.2<L>  /  TBili  0.2  /  DBili  x   /  AST  49<H>  /  ALT  26  /  AlkPhos  89  10-08    LIVER FUNCTIONS - ( 08 Oct 2019 07:13 )  Alb: 3.2 g/dL / Pro: 5.7 g/dL / ALK PHOS: 89 U/L / ALT: 26 U/L / AST: 49 U/L / GGT: x                                       7.6    7.61  )-----------( 150      ( 08 Oct 2019 09:55 )             23.7                         7.7    8.25  )-----------( 161      ( 07 Oct 2019 20:54 )             24.2                         8.0    10.21 )-----------( 162      ( 07 Oct 2019 06:24 )             24.2                         8.2    9.14  )-----------( 166      ( 06 Oct 2019 18:59 )             24.9                         7.5    10.34 )-----------( 183      ( 06 Oct 2019 05:13 )             23.3       Imaging:

## 2019-10-08 NOTE — PHYSICAL THERAPY INITIAL EVALUATION ADULT - ADDITIONAL COMMENTS
contd from above: The latter 3 episodes were reported as bao red blood w/ some clots. Pt noticed blood dripping w/ blood-soaked diapers. Patient denies any pain or syncope during these episodes. Pt reports feeling nauseous occasionally but no vomit. Pt has a history of internal and external hemorrhoids for which he received hemorrhoid banding surgery x2 twenty and fifty years ago respectively. Patient denies recent travel, sick contacts, changes in diet, changes in appetite or abnormal weight change. CXR: (-)    social history: pt states lives with wife in private house 6 steps to enter, first floor setup, walk in shower with chair, HHA 10h/7d, to assist with ADLs. pt amb short distances within home with rolling walker; son states 2 people assist with negotiating front steps as baseline. pt owns wheelchair and transport chair

## 2019-10-08 NOTE — PROGRESS NOTE ADULT - PROBLEM SELECTOR PLAN 7
S/p 5 stents   on asa/plavix at home  - f/u w/ gi if ok to restart plavix S/p 5 stents   on asa/plavix at home. Asa discontinued as per convo w/ outpt cardiologist above.   - c/w plavix

## 2019-10-08 NOTE — PROGRESS NOTE ADULT - PROBLEM SELECTOR PLAN 1
FOBT+. Hemodynamically stable. S/p 1u pRBCs, post-transfusion hgb 8.2.  suspect hemorrhoidal bleed vs avm vs ?brisk UGIB   s/p 1 u prbc  -GI consulted, appreciate recs    -hold home aspirin and plavix for now, but given CAD, spoke w/ pt's cardiologist who recommends c/w plavix. will f/u GI if OK to restart plavix  -hold home atenolol     -monitor CBC q12hrs, transfuse if hgb<8 given CAD hx  -c/w IV PPI BID for now   -clear diet, adv as tolerates. goal for symptomatic support for now and hold off on cscope FOBT+. Hemodynamically stable. S/p 1u pRBCs.   suspect hemorrhoidal bleed vs avm vs diverticular bleed    -GI consulted, no indication for scope unless pt rebleeds or w/ acute drop in hgb. Ok to restart plavix.      -spoke w/ Cardiologist, Dr. Holman, ok to stop aspirin and c/w plavix as outpt as last stent 6 yrs ago.  -c/w home plavix   -hold home atenolol     -monitor CBC qDaily, transfuse if hgb<7   -c/w IV PPI BID for now   -advance to consistent carb diet. Monitor for next BM. FOBT+. Hemodynamically stable. S/p 1u pRBCs.   suspect hemorrhoidal bleed vs avm vs diverticular bleed    -GI consulted, no indication for scope unless pt rebleeds or w/ acute drop in hgb. Ok to restart home plavix.      -spoke w/ Cardiologist, Dr. Holman, ok to stop aspirin and c/w plavix as outpt as last stent 6 yrs ago.   -hold home atenolol     -monitor CBC qDaily, transfuse if hgb<7   -transition to pantoprazole PO    -advance to consistent carb diet. Monitor for next BM. suspect hemorrhoidal bleed vs avm vs diverticular bleed    -GI consulted, no indication for scope unless pt rebleeds or w/ acute drop in hgb. Ok to restart home plavix.      -spoke w/ Cardiologist, Dr. Holman, ok to stop aspirin and c/w plavix as outpt as last stent 6 yrs ago.   -hold home atenolol     -monitor CBC qDaily, transfuse if hgb<7   -transition to pantoprazole PO    -advance to consistent carb diet. Monitor for next BM.

## 2019-10-08 NOTE — PROGRESS NOTE ADULT - PROBLEM SELECTOR PLAN 3
Stable Cr since admission. Baseline Cr ~1.1 in 2015.    suspect prerenal due to volume loss now s/p prbc  -Trend Cr qDaily   -Avoid nephrotoxic drugs

## 2019-10-09 ENCOUNTER — TRANSCRIPTION ENCOUNTER (OUTPATIENT)
Age: 84
End: 2019-10-09

## 2019-10-09 VITALS
OXYGEN SATURATION: 93 % | SYSTOLIC BLOOD PRESSURE: 132 MMHG | RESPIRATION RATE: 17 BRPM | DIASTOLIC BLOOD PRESSURE: 60 MMHG | HEART RATE: 71 BPM | TEMPERATURE: 98 F

## 2019-10-09 LAB
ALBUMIN SERPL ELPH-MCNC: 3.1 G/DL — LOW (ref 3.3–5)
ALP SERPL-CCNC: 98 U/L — SIGNIFICANT CHANGE UP (ref 40–120)
ALT FLD-CCNC: 27 U/L — SIGNIFICANT CHANGE UP (ref 10–45)
ANION GAP SERPL CALC-SCNC: 8 MMOL/L — SIGNIFICANT CHANGE UP (ref 5–17)
AST SERPL-CCNC: 32 U/L — SIGNIFICANT CHANGE UP (ref 10–40)
BILIRUB SERPL-MCNC: 0.2 MG/DL — SIGNIFICANT CHANGE UP (ref 0.2–1.2)
BUN SERPL-MCNC: 44 MG/DL — HIGH (ref 7–23)
CALCIUM SERPL-MCNC: 7.9 MG/DL — LOW (ref 8.4–10.5)
CHLORIDE SERPL-SCNC: 108 MMOL/L — SIGNIFICANT CHANGE UP (ref 96–108)
CO2 SERPL-SCNC: 21 MMOL/L — LOW (ref 22–31)
CREAT SERPL-MCNC: 1.61 MG/DL — HIGH (ref 0.5–1.3)
GLUCOSE BLDC GLUCOMTR-MCNC: 190 MG/DL — HIGH (ref 70–99)
GLUCOSE BLDC GLUCOMTR-MCNC: 298 MG/DL — HIGH (ref 70–99)
GLUCOSE SERPL-MCNC: 187 MG/DL — HIGH (ref 70–99)
HCT VFR BLD CALC: 24.6 % — LOW (ref 39–50)
HGB BLD-MCNC: 7.8 G/DL — LOW (ref 13–17)
MAGNESIUM SERPL-MCNC: 1.9 MG/DL — SIGNIFICANT CHANGE UP (ref 1.6–2.6)
MCHC RBC-ENTMCNC: 31.7 GM/DL — LOW (ref 32–36)
MCHC RBC-ENTMCNC: 34.4 PG — HIGH (ref 27–34)
MCV RBC AUTO: 108.4 FL — HIGH (ref 80–100)
PHOSPHATE SERPL-MCNC: 3.5 MG/DL — SIGNIFICANT CHANGE UP (ref 2.5–4.5)
PLATELET # BLD AUTO: 144 K/UL — LOW (ref 150–400)
POTASSIUM SERPL-MCNC: 5.3 MMOL/L — SIGNIFICANT CHANGE UP (ref 3.5–5.3)
POTASSIUM SERPL-SCNC: 5.3 MMOL/L — SIGNIFICANT CHANGE UP (ref 3.5–5.3)
PROT SERPL-MCNC: 5.6 G/DL — LOW (ref 6–8.3)
RBC # BLD: 2.27 M/UL — LOW (ref 4.2–5.8)
RBC # FLD: 15.9 % — HIGH (ref 10.3–14.5)
SODIUM SERPL-SCNC: 137 MMOL/L — SIGNIFICANT CHANGE UP (ref 135–145)
WBC # BLD: 7.88 K/UL — SIGNIFICANT CHANGE UP (ref 3.8–10.5)
WBC # FLD AUTO: 7.88 K/UL — SIGNIFICANT CHANGE UP (ref 3.8–10.5)

## 2019-10-09 PROCEDURE — 97161 PT EVAL LOW COMPLEX 20 MIN: CPT

## 2019-10-09 PROCEDURE — 84100 ASSAY OF PHOSPHORUS: CPT

## 2019-10-09 PROCEDURE — 97110 THERAPEUTIC EXERCISES: CPT

## 2019-10-09 PROCEDURE — 83735 ASSAY OF MAGNESIUM: CPT

## 2019-10-09 PROCEDURE — 99239 HOSP IP/OBS DSCHRG MGMT >30: CPT | Mod: GC

## 2019-10-09 PROCEDURE — 80053 COMPREHEN METABOLIC PANEL: CPT

## 2019-10-09 PROCEDURE — 97116 GAIT TRAINING THERAPY: CPT

## 2019-10-09 PROCEDURE — 83036 HEMOGLOBIN GLYCOSYLATED A1C: CPT

## 2019-10-09 PROCEDURE — 36430 TRANSFUSION BLD/BLD COMPNT: CPT

## 2019-10-09 PROCEDURE — P9016: CPT

## 2019-10-09 PROCEDURE — 84484 ASSAY OF TROPONIN QUANT: CPT

## 2019-10-09 PROCEDURE — 99285 EMERGENCY DEPT VISIT HI MDM: CPT | Mod: 25

## 2019-10-09 PROCEDURE — 86850 RBC ANTIBODY SCREEN: CPT

## 2019-10-09 PROCEDURE — 80048 BASIC METABOLIC PNL TOTAL CA: CPT

## 2019-10-09 PROCEDURE — 86923 COMPATIBILITY TEST ELECTRIC: CPT

## 2019-10-09 PROCEDURE — 82272 OCCULT BLD FECES 1-3 TESTS: CPT

## 2019-10-09 PROCEDURE — 85730 THROMBOPLASTIN TIME PARTIAL: CPT

## 2019-10-09 PROCEDURE — 86901 BLOOD TYPING SEROLOGIC RH(D): CPT

## 2019-10-09 PROCEDURE — 86900 BLOOD TYPING SEROLOGIC ABO: CPT

## 2019-10-09 PROCEDURE — 93005 ELECTROCARDIOGRAM TRACING: CPT | Mod: XU

## 2019-10-09 PROCEDURE — 85610 PROTHROMBIN TIME: CPT

## 2019-10-09 PROCEDURE — 71045 X-RAY EXAM CHEST 1 VIEW: CPT

## 2019-10-09 PROCEDURE — 90686 IIV4 VACC NO PRSV 0.5 ML IM: CPT

## 2019-10-09 PROCEDURE — 82962 GLUCOSE BLOOD TEST: CPT

## 2019-10-09 PROCEDURE — 85027 COMPLETE CBC AUTOMATED: CPT

## 2019-10-09 RX ORDER — DOCUSATE SODIUM 100 MG
100 CAPSULE ORAL DAILY
Refills: 0 | Status: DISCONTINUED | OUTPATIENT
Start: 2019-10-09 | End: 2019-10-09

## 2019-10-09 RX ORDER — POLYETHYLENE GLYCOL 3350 17 G/17G
17 POWDER, FOR SOLUTION ORAL DAILY
Refills: 0 | Status: DISCONTINUED | OUTPATIENT
Start: 2019-10-09 | End: 2019-10-09

## 2019-10-09 RX ORDER — ATENOLOL 25 MG/1
0.5 TABLET ORAL
Qty: 0 | Refills: 0 | DISCHARGE

## 2019-10-09 RX ORDER — SENNA PLUS 8.6 MG/1
1 TABLET ORAL DAILY
Refills: 0 | Status: DISCONTINUED | OUTPATIENT
Start: 2019-10-09 | End: 2019-10-09

## 2019-10-09 RX ADMIN — Medication 6 UNIT(S): at 08:35

## 2019-10-09 RX ADMIN — Medication 3: at 12:38

## 2019-10-09 RX ADMIN — CLOPIDOGREL BISULFATE 75 MILLIGRAM(S): 75 TABLET, FILM COATED ORAL at 12:50

## 2019-10-09 RX ADMIN — Medication 325 MILLIGRAM(S): at 12:51

## 2019-10-09 RX ADMIN — Medication 1000 UNIT(S): at 12:51

## 2019-10-09 RX ADMIN — Medication 100 MILLIGRAM(S): at 12:52

## 2019-10-09 RX ADMIN — Medication 1: at 08:34

## 2019-10-09 RX ADMIN — Medication 6 UNIT(S): at 12:39

## 2019-10-09 RX ADMIN — PANTOPRAZOLE SODIUM 40 MILLIGRAM(S): 20 TABLET, DELAYED RELEASE ORAL at 05:14

## 2019-10-09 RX ADMIN — SENNA PLUS 1 TABLET(S): 8.6 TABLET ORAL at 12:52

## 2019-10-09 RX ADMIN — INFLUENZA VIRUS VACCINE 0.5 MILLILITER(S): 15; 15; 15; 15 SUSPENSION INTRAMUSCULAR at 15:47

## 2019-10-09 RX ADMIN — DULOXETINE HYDROCHLORIDE 30 MILLIGRAM(S): 30 CAPSULE, DELAYED RELEASE ORAL at 05:14

## 2019-10-09 NOTE — PROGRESS NOTE ADULT - SUBJECTIVE AND OBJECTIVE BOX
***************************************************************  Dalia Javier, PGY1  Internal Medicine   pager: [phone] / [phone]  ***************************************************************    HOME ADLER  89y  MRN: [id number]    Patient is a 89y old  Male who presents with a chief complaint of Rectal bleeding (08 Oct 2019 12:21)      Overnight Events / Subjective: no events ON. Denies fever, CP, SOB, abn pain, N/V, dysuria. Tolerating diet.      MEDICATIONS  (STANDING):  atorvastatin 40 milliGRAM(s) Oral at bedtime  cholecalciferol 1000 Unit(s) Oral daily  clopidogrel Tablet 75 milliGRAM(s) Oral daily  dextrose 5%. 1000 milliLiter(s) (50 mL/Hr) IV Continuous <Continuous>  dextrose 50% Injectable 12.5 Gram(s) IV Push once  donepezil 10 milliGRAM(s) Oral at bedtime  DULoxetine 30 milliGRAM(s) Oral two times a day  ferrous    sulfate 325 milliGRAM(s) Oral daily  influenza   Vaccine 0.5 milliLiter(s) IntraMuscular once  insulin glargine Injectable (LANTUS) 14 Unit(s) SubCutaneous at bedtime  insulin lispro (HumaLOG) corrective regimen sliding scale   SubCutaneous three times a day before meals  insulin lispro (HumaLOG) corrective regimen sliding scale   SubCutaneous at bedtime  insulin lispro Injectable (HumaLOG) 6 Unit(s) SubCutaneous three times a day with meals  pantoprazole    Tablet 40 milliGRAM(s) Oral before breakfast  phenytoin   Capsule 300 milliGRAM(s) Oral at bedtime    MEDICATIONS  (PRN):  dextrose 40% Gel 15 Gram(s) Oral once PRN Blood Glucose LESS THAN 70 milliGRAM(s)/deciliter  glucagon  Injectable 1 milliGRAM(s) IntraMuscular once PRN Glucose LESS THAN 70 milligrams/deciliter      Objective:    Vitals: Vital Signs Last 24 Hrs  T(C): 36.9 (10-09-19 @ 04:41), Max: 36.9 (10-09-19 @ 04:41)  T(F): 98.4 (10-09-19 @ 04:41), Max: 98.4 (10-09-19 @ 04:41)  HR: 79 (10-09-19 @ 04:41) (70 - 98)  BP: 109/62 (10-09-19 @ 04:41) (107/46 - 134/68)  BP(mean): --  RR: 18 (10-09-19 @ 04:41) (18 - 18)  SpO2: 93% (10-09-19 @ 04:41) (90% - 93%)            I&O's Summary    08 Oct 2019 07:01  -  09 Oct 2019 07:00  --------------------------------------------------------  IN: 350 mL / OUT: 1120 mL / NET: -770 mL        PHYSICAL EXAM:  GENERAL: NAD  HEAD:  Atraumatic, Normocephalic  EYES: EOMI, conjunctiva and sclera clear  CHEST/LUNG: Clear to percussion bilaterally; No rales, rhonchi, wheezing, or rubs  HEART: Regular rate and rhythm; No murmurs, rubs, or gallops  ABDOMEN: Soft, Nontender, Nondistended;   SKIN: No rashes or lesions  NERVOUS SYSTEM:  Alert & Oriented X3, no focal deficit    LABS:  10-09    137  |  108  |  44<H>  ----------------------------<  187<H>  5.3   |  21<L>  |  1.61<H>  10-08    137  |  105  |  51<H>  ----------------------------<  190<H>  5.4<H>   |  20<L>  |  1.74<H>  10-07    137  |  107  |  50<H>  ----------------------------<  187<H>  5.2   |  22  |  1.51<H>    Ca    7.9<L>      09 Oct 2019 07:02  Ca    8.5      08 Oct 2019 07:13  Ca    8.2<L>      07 Oct 2019 08:59  Phos  3.5     10-09  Mg     1.9     10-09    TPro  5.6<L>  /  Alb  3.1<L>  /  TBili  0.2  /  DBili  x   /  AST  32  /  ALT  27  /  AlkPhos  98  10-09  TPro  5.7<L>  /  Alb  3.2<L>  /  TBili  0.2  /  DBili  x   /  AST  49<H>  /  ALT  26  /  AlkPhos  89  10-08  TPro  5.6<L>  /  Alb  3.3  /  TBili  0.2  /  DBili  x   /  AST  75<H>  /  ALT  22  /  AlkPhos  84  10-07                                              7.6    7.61  )-----------( 150      ( 08 Oct 2019 09:55 )             23.7                         7.7    8.25  )-----------( 161      ( 07 Oct 2019 20:54 )             24.2                         8.0    10.21 )-----------( 162      ( 07 Oct 2019 06:24 )             24.2     CAPILLARY BLOOD GLUCOSE      POCT Blood Glucose.: 190 mg/dL (09 Oct 2019 07:54)  POCT Blood Glucose.: 199 mg/dL (08 Oct 2019 21:04)  POCT Blood Glucose.: 171 mg/dL (08 Oct 2019 16:58)  POCT Blood Glucose.: 196 mg/dL (08 Oct 2019 11:45)      RADIOLOGY & ADDITIONAL TESTS:    Imaging Personally Reviewed:  [x ] YES  [ ] NO    Consultants involved in case:   Consultant(s) Notes Reviewed:  [ x] YES  [ ] NO:   Care Discussed with Consultants/Other Providers [x ] YES  [ ] NO ***************************************************************  Dalia Javier, PGY1  Internal Medicine   pager: [phone] / [phone]  ***************************************************************    HOME ADLER  89y  MRN: [id number]    Patient is a 89y old  Male who presents with a chief complaint of Rectal bleeding (08 Oct 2019 12:21)      Overnight Events / Subjective: no events ON. Denies fever, CP, SOB, abn pain, N/V, dysuria. Tolerating diet. Hgb 7.8 this AM. No BMs ON.       MEDICATIONS  (STANDING):  atorvastatin 40 milliGRAM(s) Oral at bedtime  cholecalciferol 1000 Unit(s) Oral daily  clopidogrel Tablet 75 milliGRAM(s) Oral daily  dextrose 5%. 1000 milliLiter(s) (50 mL/Hr) IV Continuous <Continuous>  dextrose 50% Injectable 12.5 Gram(s) IV Push once  donepezil 10 milliGRAM(s) Oral at bedtime  DULoxetine 30 milliGRAM(s) Oral two times a day  ferrous    sulfate 325 milliGRAM(s) Oral daily  influenza   Vaccine 0.5 milliLiter(s) IntraMuscular once  insulin glargine Injectable (LANTUS) 14 Unit(s) SubCutaneous at bedtime  insulin lispro (HumaLOG) corrective regimen sliding scale   SubCutaneous three times a day before meals  insulin lispro (HumaLOG) corrective regimen sliding scale   SubCutaneous at bedtime  insulin lispro Injectable (HumaLOG) 6 Unit(s) SubCutaneous three times a day with meals  pantoprazole    Tablet 40 milliGRAM(s) Oral before breakfast  phenytoin   Capsule 300 milliGRAM(s) Oral at bedtime    MEDICATIONS  (PRN):  dextrose 40% Gel 15 Gram(s) Oral once PRN Blood Glucose LESS THAN 70 milliGRAM(s)/deciliter  glucagon  Injectable 1 milliGRAM(s) IntraMuscular once PRN Glucose LESS THAN 70 milligrams/deciliter      Objective:    Vitals: Vital Signs Last 24 Hrs  T(C): 36.9 (10-09-19 @ 04:41), Max: 36.9 (10-09-19 @ 04:41)  T(F): 98.4 (10-09-19 @ 04:41), Max: 98.4 (10-09-19 @ 04:41)  HR: 79 (10-09-19 @ 04:41) (70 - 98)  BP: 109/62 (10-09-19 @ 04:41) (107/46 - 134/68)  BP(mean): --  RR: 18 (10-09-19 @ 04:41) (18 - 18)  SpO2: 93% (10-09-19 @ 04:41) (90% - 93%)            I&O's Summary    08 Oct 2019 07:01  -  09 Oct 2019 07:00  --------------------------------------------------------  IN: 350 mL / OUT: 1120 mL / NET: -770 mL        PHYSICAL EXAM:  GENERAL: NAD, well-groomed, well-developed elderly gentleman   HEAD:  Atraumatic, Normocephalic  EYES: EOMI, conjunctiva and sclera clear  ENMT: No tonsillar erythema, exudates, or enlargement; Moist mucous membranes, Good dentition, No lesions  CHEST/LUNG: Clear to percussion bilaterally; No rales, rhonchi, wheezing, or rubs  HEART: Regular rate and rhythm; No murmurs, rubs, or gallops  ABDOMEN: Soft, Nontender, Nondistended; Bowel sounds present  VASCULAR:  2+ Peripheral Pulses, No clubbing, cyanosis, or edema  LYMPH: No lymphadenopathy noted  SKIN: No rashes or lesions  NERVOUS SYSTEM:  Alert & Oriented X3, Good concentration    LABS:  10-09    137  |  108  |  44<H>  ----------------------------<  187<H>  5.3   |  21<L>  |  1.61<H>  10-08    137  |  105  |  51<H>  ----------------------------<  190<H>  5.4<H>   |  20<L>  |  1.74<H>  10-07    137  |  107  |  50<H>  ----------------------------<  187<H>  5.2   |  22  |  1.51<H>    Ca    7.9<L>      09 Oct 2019 07:02  Ca    8.5      08 Oct 2019 07:13  Ca    8.2<L>      07 Oct 2019 08:59  Phos  3.5     10-09  Mg     1.9     10-09    TPro  5.6<L>  /  Alb  3.1<L>  /  TBili  0.2  /  DBili  x   /  AST  32  /  ALT  27  /  AlkPhos  98  10-09  TPro  5.7<L>  /  Alb  3.2<L>  /  TBili  0.2  /  DBili  x   /  AST  49<H>  /  ALT  26  /  AlkPhos  89  10-08  TPro  5.6<L>  /  Alb  3.3  /  TBili  0.2  /  DBili  x   /  AST  75<H>  /  ALT  22  /  AlkPhos  84  10-07                                              7.6    7.61  )-----------( 150      ( 08 Oct 2019 09:55 )             23.7                         7.7    8.25  )-----------( 161      ( 07 Oct 2019 20:54 )             24.2                         8.0    10.21 )-----------( 162      ( 07 Oct 2019 06:24 )             24.2     CAPILLARY BLOOD GLUCOSE      POCT Blood Glucose.: 190 mg/dL (09 Oct 2019 07:54)  POCT Blood Glucose.: 199 mg/dL (08 Oct 2019 21:04)  POCT Blood Glucose.: 171 mg/dL (08 Oct 2019 16:58)  POCT Blood Glucose.: 196 mg/dL (08 Oct 2019 11:45)      RADIOLOGY & ADDITIONAL TESTS:    Imaging Personally Reviewed:  [x ] YES  [ ] NO    Consultants involved in case:   Consultant(s) Notes Reviewed:  [ x] YES  [ ] NO:   Care Discussed with Consultants/Other Providers [x ] YES  [ ] NO ***************************************************************  Dalia Javier, PGY1  Internal Medicine   pager: [phone] / [phone]  ***************************************************************    HOME ADLER  89y  MRN: [id number]    Patient is a 89y old  Male who presents with a chief complaint of Rectal bleeding (08 Oct 2019 12:21)      Overnight Events / Subjective: no events ON. Denies fever, CP, SOB, abn pain, N/V, dysuria. Tolerating diet. Hgb 7.8 this AM. No BMs ON. One formed BM this morning, no blood in stool.        MEDICATIONS  (STANDING):  atorvastatin 40 milliGRAM(s) Oral at bedtime  cholecalciferol 1000 Unit(s) Oral daily  clopidogrel Tablet 75 milliGRAM(s) Oral daily  dextrose 5%. 1000 milliLiter(s) (50 mL/Hr) IV Continuous <Continuous>  dextrose 50% Injectable 12.5 Gram(s) IV Push once  donepezil 10 milliGRAM(s) Oral at bedtime  DULoxetine 30 milliGRAM(s) Oral two times a day  ferrous    sulfate 325 milliGRAM(s) Oral daily  influenza   Vaccine 0.5 milliLiter(s) IntraMuscular once  insulin glargine Injectable (LANTUS) 14 Unit(s) SubCutaneous at bedtime  insulin lispro (HumaLOG) corrective regimen sliding scale   SubCutaneous three times a day before meals  insulin lispro (HumaLOG) corrective regimen sliding scale   SubCutaneous at bedtime  insulin lispro Injectable (HumaLOG) 6 Unit(s) SubCutaneous three times a day with meals  pantoprazole    Tablet 40 milliGRAM(s) Oral before breakfast  phenytoin   Capsule 300 milliGRAM(s) Oral at bedtime    MEDICATIONS  (PRN):  dextrose 40% Gel 15 Gram(s) Oral once PRN Blood Glucose LESS THAN 70 milliGRAM(s)/deciliter  glucagon  Injectable 1 milliGRAM(s) IntraMuscular once PRN Glucose LESS THAN 70 milligrams/deciliter      Objective:    Vitals: Vital Signs Last 24 Hrs  T(C): 36.9 (10-09-19 @ 04:41), Max: 36.9 (10-09-19 @ 04:41)  T(F): 98.4 (10-09-19 @ 04:41), Max: 98.4 (10-09-19 @ 04:41)  HR: 79 (10-09-19 @ 04:41) (70 - 98)  BP: 109/62 (10-09-19 @ 04:41) (107/46 - 134/68)  BP(mean): --  RR: 18 (10-09-19 @ 04:41) (18 - 18)  SpO2: 93% (10-09-19 @ 04:41) (90% - 93%)            I&O's Summary    08 Oct 2019 07:01  -  09 Oct 2019 07:00  --------------------------------------------------------  IN: 350 mL / OUT: 1120 mL / NET: -770 mL        PHYSICAL EXAM:  GENERAL: NAD, well-groomed, well-developed elderly gentleman   HEAD:  Atraumatic, Normocephalic  EYES: EOMI, conjunctiva and sclera clear  ENMT: No tonsillar erythema, exudates, or enlargement; Moist mucous membranes, Good dentition, No lesions  CHEST/LUNG: Clear to percussion bilaterally; No rales, rhonchi, wheezing, or rubs  HEART: Regular rate and rhythm; No murmurs, rubs, or gallops  ABDOMEN: Soft, Nontender, Nondistended; Bowel sounds present  VASCULAR:  2+ Peripheral Pulses, No clubbing, cyanosis, or edema  LYMPH: No lymphadenopathy noted  SKIN: No rashes or lesions  NERVOUS SYSTEM:  Alert & Oriented X3, Good concentration    LABS:  10-09    137  |  108  |  44<H>  ----------------------------<  187<H>  5.3   |  21<L>  |  1.61<H>  10-08    137  |  105  |  51<H>  ----------------------------<  190<H>  5.4<H>   |  20<L>  |  1.74<H>  10-07    137  |  107  |  50<H>  ----------------------------<  187<H>  5.2   |  22  |  1.51<H>    Ca    7.9<L>      09 Oct 2019 07:02  Ca    8.5      08 Oct 2019 07:13  Ca    8.2<L>      07 Oct 2019 08:59  Phos  3.5     10-09  Mg     1.9     10-09    TPro  5.6<L>  /  Alb  3.1<L>  /  TBili  0.2  /  DBili  x   /  AST  32  /  ALT  27  /  AlkPhos  98  10-09  TPro  5.7<L>  /  Alb  3.2<L>  /  TBili  0.2  /  DBili  x   /  AST  49<H>  /  ALT  26  /  AlkPhos  89  10-08  TPro  5.6<L>  /  Alb  3.3  /  TBili  0.2  /  DBili  x   /  AST  75<H>  /  ALT  22  /  AlkPhos  84  10-07                                              7.6    7.61  )-----------( 150      ( 08 Oct 2019 09:55 )             23.7                         7.7    8.25  )-----------( 161      ( 07 Oct 2019 20:54 )             24.2                         8.0    10.21 )-----------( 162      ( 07 Oct 2019 06:24 )             24.2     CAPILLARY BLOOD GLUCOSE      POCT Blood Glucose.: 190 mg/dL (09 Oct 2019 07:54)  POCT Blood Glucose.: 199 mg/dL (08 Oct 2019 21:04)  POCT Blood Glucose.: 171 mg/dL (08 Oct 2019 16:58)  POCT Blood Glucose.: 196 mg/dL (08 Oct 2019 11:45)      RADIOLOGY & ADDITIONAL TESTS:    Imaging Personally Reviewed:  [x ] YES  [ ] NO    Consultants involved in case:   Consultant(s) Notes Reviewed:  [ x] YES  [ ] NO:   Care Discussed with Consultants/Other Providers [x ] YES  [ ] NO

## 2019-10-09 NOTE — PROGRESS NOTE ADULT - PROBLEM SELECTOR PLAN 2
Trops 25 --> 25. EKG NSR. Chest discomfort w/ exertion.   Most likely stable angina. Resolved   -hold atenolol for now given bleed, restart tomorrow if bp stable  -continue to monitor for further episodes of CP  -d/w pt's cardiologist, as above Trops 25 --> 25. EKG NSR. Chest discomfort w/ exertion.   Most likely stable angina. Resolved   -hold atenolol for now given bleed, can restart tmrw if bp stable.   -continue to monitor for further episodes of CP  -d/w pt's cardiologist, as above

## 2019-10-09 NOTE — PROGRESS NOTE ADULT - PROBLEM SELECTOR PLAN 7
S/p 5 stents   on asa/plavix at home. Asa discontinued as per convo w/ outpt cardiologist above.   - c/w plavix

## 2019-10-09 NOTE — PROGRESS NOTE ADULT - PROBLEM SELECTOR PLAN 4
Home dose of lantus 22U, humalog 10U before meals. A1C 6.7.   -c/w bedtime lantus to 14U and humalog to 6U before meals.  -c/w low dose ISS   -fingerstick before meals and before bed

## 2019-10-09 NOTE — PROGRESS NOTE ADULT - PROBLEM SELECTOR PLAN 9
DVT Prophylaxis: SCDs for now given bleed  Diet: consistent carb diet  Dispo: pending PT.     #GOC: pt is DNR/DNI. MOLST form in chart, signed DVT Prophylaxis: SCDs for now given bleed  Diet: consistent carb diet  Dispo: home w/ home pt.     #GOC: pt is DNR/DNI. MOLST form in chart, signed

## 2019-10-09 NOTE — DISCHARGE NOTE NURSING/CASE MANAGEMENT/SOCIAL WORK - CASE MANAGER'S NAME
Pt tripped in kitchen and fell on R knee a couple of days ago; still with pain/swelling to R knee ROSA COLLINS -[phone]

## 2019-10-09 NOTE — PROGRESS NOTE ADULT - PROBLEM SELECTOR PLAN 1
suspect hemorrhoidal bleed vs avm vs diverticular bleed    -GI consulted, no indication for scope unless pt rebleeds or w/ acute drop in hgb. Ok to restart home plavix.      -spoke w/ Cardiologist, Dr. Holman, ok to stop aspirin and c/w plavix as outpt as last stent 6 yrs ago.   -hold home atenolol     -monitor CBC qDaily, transfuse if hgb<7   -transition to pantoprazole PO    -advance to consistent carb diet. Monitor for next BM. suspect hemorrhoidal bleed vs avm vs diverticular bleed    -GI consulted, no indication for scope unless pt rebleeds or w/ acute drop in hgb.  -home plavix restarted.      -spoke w/ Cardiologist, Dr. Holman, ok to stop aspirin and c/w plavix as outpt as last stent 6 yrs ago.   -hold home atenolol     -monitor CBC qDaily, transfuse if hgb<7   -transition to pantoprazole PO    -monitor for next BM. No BMs but otherwise no active signs of bleeding. H/H low but stable back on plavix  suspect hemorrhoidal bleed vs avm vs diverticular bleed    -GI consulted, no intervention unless pt rebleeds or w/ acute drop in hgb.  -home plavix restarted.      -spoke w/ Cardiologist, Dr. Holman, ok to stop aspirin and c/w plavix as outpt as last stent 6 yrs ago.   -hold home atenolol     -monitor CBC qDaily, transfuse if hgb<7   -transition to pantoprazole PO    -monitor for next BM. No BMs but otherwise no active signs of bleeding. H/H low but stable back on plavix  suspect hemorrhoidal bleed vs avm vs diverticular bleed    -GI consulted, no intervention unless pt rebleeds or w/ acute drop in hgb.  -home plavix restarted.      -spoke w/ Cardiologist, Dr. Holman, ok to stop aspirin and c/w plavix as outpt as last stent 6 yrs ago.   -hold home atenolol     -monitor CBC qDaily, transfuse if hgb<7   -transition to pantoprazole PO    -one formed BM this morning with no blood seen in stool

## 2019-10-11 ENCOUNTER — INPATIENT (INPATIENT)
Facility: HOSPITAL | Age: 84
LOS: 10 days | Discharge: HOME | DRG: 811 | End: 2019-10-22
Attending: INTERNAL MEDICINE | Admitting: HOSPITALIST
Payer: MEDICARE

## 2019-10-11 VITALS
SYSTOLIC BLOOD PRESSURE: 127 MMHG | DIASTOLIC BLOOD PRESSURE: 75 MMHG | RESPIRATION RATE: 16 BRPM | OXYGEN SATURATION: 94 % | HEART RATE: 89 BPM | TEMPERATURE: 98 F

## 2019-10-11 DIAGNOSIS — N18.3 CHRONIC KIDNEY DISEASE, STAGE 3 (MODERATE): ICD-10-CM

## 2019-10-11 DIAGNOSIS — Z29.9 ENCOUNTER FOR PROPHYLACTIC MEASURES, UNSPECIFIED: ICD-10-CM

## 2019-10-11 DIAGNOSIS — E11.9 TYPE 2 DIABETES MELLITUS WITHOUT COMPLICATIONS: ICD-10-CM

## 2019-10-11 DIAGNOSIS — I21.4 NON-ST ELEVATION (NSTEMI) MYOCARDIAL INFARCTION: ICD-10-CM

## 2019-10-11 DIAGNOSIS — I73.9 PERIPHERAL VASCULAR DISEASE, UNSPECIFIED: ICD-10-CM

## 2019-10-11 DIAGNOSIS — I21.A9 OTHER MYOCARDIAL INFARCTION TYPE: ICD-10-CM

## 2019-10-11 DIAGNOSIS — D50.0 IRON DEFICIENCY ANEMIA SECONDARY TO BLOOD LOSS (CHRONIC): ICD-10-CM

## 2019-10-11 DIAGNOSIS — I25.10 ATHEROSCLEROTIC HEART DISEASE OF NATIVE CORONARY ARTERY WITHOUT ANGINA PECTORIS: ICD-10-CM

## 2019-10-11 DIAGNOSIS — I10 ESSENTIAL (PRIMARY) HYPERTENSION: ICD-10-CM

## 2019-10-11 LAB
ALBUMIN SERPL ELPH-MCNC: 3.3 G/DL — SIGNIFICANT CHANGE UP (ref 3.3–5)
ALP SERPL-CCNC: 120 U/L — SIGNIFICANT CHANGE UP (ref 40–120)
ALT FLD-CCNC: 28 U/L — SIGNIFICANT CHANGE UP (ref 10–45)
ANION GAP SERPL CALC-SCNC: 10 MMOL/L — SIGNIFICANT CHANGE UP (ref 5–17)
APTT BLD: 24 SEC — LOW (ref 27.5–36.3)
AST SERPL-CCNC: 30 U/L — SIGNIFICANT CHANGE UP (ref 10–40)
BASOPHILS # BLD AUTO: 0.08 K/UL — SIGNIFICANT CHANGE UP (ref 0–0.2)
BASOPHILS NFR BLD AUTO: 0.9 % — SIGNIFICANT CHANGE UP (ref 0–2)
BILIRUB SERPL-MCNC: 0.2 MG/DL — SIGNIFICANT CHANGE UP (ref 0.2–1.2)
BLD GP AB SCN SERPL QL: NEGATIVE — SIGNIFICANT CHANGE UP
BUN SERPL-MCNC: 32 MG/DL — HIGH (ref 7–23)
CALCIUM SERPL-MCNC: 8 MG/DL — LOW (ref 8.4–10.5)
CHLORIDE SERPL-SCNC: 105 MMOL/L — SIGNIFICANT CHANGE UP (ref 96–108)
CK MB CFR SERPL CALC: 5.2 NG/ML — SIGNIFICANT CHANGE UP (ref 0–6.7)
CO2 SERPL-SCNC: 20 MMOL/L — LOW (ref 22–31)
CREAT SERPL-MCNC: 1.39 MG/DL — HIGH (ref 0.5–1.3)
EOSINOPHIL # BLD AUTO: 0.15 K/UL — SIGNIFICANT CHANGE UP (ref 0–0.5)
EOSINOPHIL NFR BLD AUTO: 1.7 % — SIGNIFICANT CHANGE UP (ref 0–6)
GLUCOSE BLDC GLUCOMTR-MCNC: 202 MG/DL — HIGH (ref 70–99)
GLUCOSE SERPL-MCNC: 256 MG/DL — HIGH (ref 70–99)
HCT VFR BLD CALC: 23.3 % — LOW (ref 39–50)
HGB BLD-MCNC: 7.5 G/DL — LOW (ref 13–17)
INR BLD: 1.11 RATIO — SIGNIFICANT CHANGE UP (ref 0.88–1.16)
LYMPHOCYTES # BLD AUTO: 1.16 K/UL — SIGNIFICANT CHANGE UP (ref 1–3.3)
LYMPHOCYTES # BLD AUTO: 13 % — SIGNIFICANT CHANGE UP (ref 13–44)
MAGNESIUM SERPL-MCNC: 1.9 MG/DL — SIGNIFICANT CHANGE UP (ref 1.6–2.6)
MCHC RBC-ENTMCNC: 32.2 GM/DL — SIGNIFICANT CHANGE UP (ref 32–36)
MCHC RBC-ENTMCNC: 34.2 PG — HIGH (ref 27–34)
MCV RBC AUTO: 106.4 FL — HIGH (ref 80–100)
MONOCYTES # BLD AUTO: 0.31 K/UL — SIGNIFICANT CHANGE UP (ref 0–0.9)
MONOCYTES NFR BLD AUTO: 3.5 % — SIGNIFICANT CHANGE UP (ref 2–14)
NEUTROPHILS # BLD AUTO: 7.12 K/UL — SIGNIFICANT CHANGE UP (ref 1.8–7.4)
NEUTROPHILS NFR BLD AUTO: 80 % — HIGH (ref 43–77)
PLATELET # BLD AUTO: 202 K/UL — SIGNIFICANT CHANGE UP (ref 150–400)
POTASSIUM SERPL-MCNC: 5.2 MMOL/L — SIGNIFICANT CHANGE UP (ref 3.5–5.3)
POTASSIUM SERPL-SCNC: 5.2 MMOL/L — SIGNIFICANT CHANGE UP (ref 3.5–5.3)
PROT SERPL-MCNC: 6.2 G/DL — SIGNIFICANT CHANGE UP (ref 6–8.3)
PROTHROM AB SERPL-ACNC: 12.7 SEC — SIGNIFICANT CHANGE UP (ref 10–12.9)
RBC # BLD: 2.19 M/UL — LOW (ref 4.2–5.8)
RBC # FLD: 16.6 % — HIGH (ref 10.3–14.5)
RH IG SCN BLD-IMP: POSITIVE — SIGNIFICANT CHANGE UP
SODIUM SERPL-SCNC: 135 MMOL/L — SIGNIFICANT CHANGE UP (ref 135–145)
TROPONIN T, HIGH SENSITIVITY RESULT: 1163 NG/L — HIGH (ref 0–51)
WBC # BLD: 8.9 K/UL — SIGNIFICANT CHANGE UP (ref 3.8–10.5)
WBC # FLD AUTO: 8.9 K/UL — SIGNIFICANT CHANGE UP (ref 3.8–10.5)

## 2019-10-11 PROCEDURE — 99291 CRITICAL CARE FIRST HOUR: CPT

## 2019-10-11 PROCEDURE — 93010 ELECTROCARDIOGRAM REPORT: CPT

## 2019-10-11 PROCEDURE — 71045 X-RAY EXAM CHEST 1 VIEW: CPT | Mod: 26

## 2019-10-11 PROCEDURE — 99223 1ST HOSP IP/OBS HIGH 75: CPT | Mod: GC

## 2019-10-11 RX ORDER — ASPIRIN/CALCIUM CARB/MAGNESIUM 324 MG
81 TABLET ORAL DAILY
Refills: 0 | Status: DISCONTINUED | OUTPATIENT
Start: 2019-10-11 | End: 2019-10-22

## 2019-10-11 RX ORDER — CLOPIDOGREL BISULFATE 75 MG/1
75 TABLET, FILM COATED ORAL DAILY
Refills: 0 | Status: DISCONTINUED | OUTPATIENT
Start: 2019-10-11 | End: 2019-10-22

## 2019-10-11 RX ORDER — METOPROLOL TARTRATE 50 MG
25 TABLET ORAL
Refills: 0 | Status: DISCONTINUED | OUTPATIENT
Start: 2019-10-11 | End: 2019-10-15

## 2019-10-11 NOTE — ED PROVIDER NOTE - NS ED ROS FT
REVIEW OF SYSTEMS:    CONSTITUTIONAL: No weakness, fevers or chills  EYES/ENT: No visual changes;  No vertigo or throat pain   NECK: No pain or stiffness  RESPIRATORY: No cough, wheezing, hemoptysis; No shortness of breath  CARDIOVASCULAR: as in HPI  GASTROINTESTINAL: No abdominal or epigastric pain. No nausea, vomiting, or hematemesis; No diarrhea or constipation. No melena or hematochezia.  GENITOURINARY: No dysuria, frequency or hematuria  NEUROLOGICAL: No numbness or weakness  SKIN: No itching, rashes

## 2019-10-11 NOTE — CONSULT NOTE ADULT - ASSESSMENT
89 y/o M w/ PMH of HTN, DM2, CAD/MI s/p PCI x 5, TIA, PVD, and recent admission for GIB referred by caridologist for c/o CP and abnml ECG.    #NSTEMI  -Pt currently w/o CP and hemodynamically stable. Currently, there is plan for conservative mgmt of NSTEMI  -Reasonable to admit to [place]  -Mgmt of NSTEMI as per pt's private cardiologist, Dr. Homlan  -Relayed to ED team    Leonel Irby MD  Cardiology Fellow  [phone]  All Cardiology service information can be found 24/7 on [email or web address], password: [email or web address] 89 y/o M w/ PMH of HTN, DM2, CAD/MI s/p PCI x 5, TIA, PVD, and recent admission for GIB referred by caridologist for c/o CP and abnml ECG.    #NSTEMI  -Pt currently w/o CP and hemodynamically stable. Currently, there is plan for conservative mgmt of NSTEMI  -Unlikely to benefit from trial of CCU  -Would admit to tele floor  -Mgmt of NSTEMI as per pt's private cardiologist, Dr. Holman  -Above relayed to ED team    Leonel Irby MD  Cardiology Fellow  [phone]  All Cardiology service information can be found 24/7 on [email or web address], password: [email or web address]

## 2019-10-11 NOTE — H&P ADULT - ATTENDING COMMENTS
I have reviewed the labs, imaging, EKG, cardiology and CCU recommendations.  89 yo M PMHx CAD s/p stents, CEA, PVD s/p stents, dementia, GI bleed p/w NSTEMI w/ ST depressions. Pt currently relatively asymptomatic. Seems to have tolerated blood well. As per cardiology will hold heparin gtt in setting of possible GIB.  -Trend troponin  -Trend cbc s/p transfusion  -PPI IV BID  -Cont. asa and plavix  -Start lopressor 25mg BID  -DVT PPx, SCDs

## 2019-10-11 NOTE — H&P ADULT - ASSESSMENT
89 yo M PMHx CAD s/p stents, CEA, PVD s/p stents, dementia, GI bleed presents sent in by Cardiologist for management of NSTEMI

## 2019-10-11 NOTE — H&P ADULT - NSICDXPASTMEDICALHX_GEN_ALL_CORE_FT
PAST MEDICAL HISTORY:  CAD (Coronary Artery Disease)     DM (Diabetes Mellitus)     HTN (Hypertension)     HTN (Hypertension)     OM (osteomyelitis)     PVD (peripheral vascular disease) PAST MEDICAL HISTORY:  CAD (Coronary Artery Disease)     DM (Diabetes Mellitus)     HTN (Hypertension)     HTN (Hypertension)     OM (osteomyelitis)     PVD (peripheral vascular disease)     Seizure

## 2019-10-11 NOTE — ED ADULT NURSE NOTE - NSIMPLEMENTINTERV_GEN_ALL_ED
Implemented All Fall with Harm Risk Interventions:  [place] to call system. Call bell, personal items and telephone within reach. Instruct patient to call for assistance. Room bathroom lighting operational. Non-slip footwear when patient is off stretcher. Physically safe environment: no spills, clutter or unnecessary equipment. Stretcher in lowest position, wheels locked, appropriate side rails in place. Provide visual cue, wrist band, yellow gown, etc. Monitor gait and stability. Monitor for mental status changes and reorient to person, place, and time. Review medications for side effects contributing to fall risk. Reinforce activity limits and safety measures with patient and family. Provide visual clues: red socks.

## 2019-10-11 NOTE — CONSULT NOTE ADULT - SUBJECTIVE AND OBJECTIVE BOX
Patient seen and evaluated at bedside    Chief Complaint: CP    HPI: 89 y/o M w/ PMH of HTN, DM2, CAD/MI s/p PCI x 5, TIA, PVD, and recent admission for GIB referred by caridologist for c/o CP and abnml ECG. As per pt, he has had stuttering CP since last admission; at that time Adolfo were neg and pt was med managed for CAD. Pt reports he had eps of CP today lasting ~2 mins at a time. Pain described as substernal chest discomfort in a band-like fashion radiating to his back. He denies any dyspnea. No palps. He was w/o any CP at time of my exam and vitals stable.      PMHx:   OM (osteomyelitis)  PVD (peripheral vascular disease)  CAD (Coronary Artery Disease)  HTN (Hypertension)  HTN (Hypertension)  DM (Diabetes Mellitus)      PSHx:   History of total hip arthroplasty  S/P carotid endarterectomy      Allergies:  No Known Allergies      Home Meds: Reviewed    Current Medications:   aspirin enteric coated 81 milliGRAM(s) Oral daily      FAMILY HISTORY:  No pertinent family history in first degree relatives      Social History: Former tobacco    REVIEW OF SYSTEMS:  CONSTITUTIONAL: No weakness, fevers or chills  EYES/ENT: No visual changes;  No dysphagia  NECK: No pain or stiffness  RESPIRATORY: No cough, wheezing, hemoptysis; No shortness of breath  CARDIOVASCULAR: +CP  GASTROINTESTINAL: No abdominal or epigastric pain. No nausea, vomiting, or hematemesis; No diarrhea or constipation. No melena or hematochezia.  BACK: No back pain  GENITOURINARY: No dysuria, frequency or hematuria  NEUROLOGICAL: No numbness or weakness  SKIN: No itching, burning, rashes, or lesions   All other review of systems is negative unless indicated above.    Physical Exam:  T(F): 98.1 (10-11), Max: 98.1 (10-11)  HR: 93 (10-11) (81 - 93)  BP: 142/74 (10-11) (127/75 - 150/71)  RR: 20 (10-11)  SpO2: 100% (10-11)  Gen: NAD. Elderly. Frail.  HEENT: NCAT. PERRLA b/l.  Neck: No JVP elev.  CV: Normal S1, S2. RRR. No MRG.  Chest: CTAB. No WRR.  Abd: +BSx4. Soft. NTND.  Ext: No LE edema.  Skin: No cyanosis.    Cardiovascular Diagnostic Testing:    ECG: Personally reviewed: Prominent R waves in V2, V3. Precordial ST-depressions in V1-V6, I, avL.    Imaging:    CXR: Personally reviewed. Unremarkable.    Labs: Personally reviewed.                        7.5    8.90  )-----------( 202      ( 11 Oct 2019 16:30 )             23.3     10-11    135  |  105  |  32<H>  ----------------------------<  256<H>  5.2   |  20<L>  |  1.39<H>    Ca    8.0<L>      11 Oct 2019 16:30  Mg     1.9     10-11    TPro  6.2  /  Alb  3.3  /  TBili  0.2  /  DBili  x   /  AST  30  /  ALT  28  /  AlkPhos  120  10-11    PT/INR - ( 11 Oct 2019 16:30 )   PT: 12.7 sec;   INR: 1.11 ratio         PTT - ( 11 Oct 2019 16:30 )  PTT:24.0 sec    CARDIAC MARKERS ( 11 Oct 2019 16:30 )  1163 ng/L / x     / x     / x     / x     / 5.2 ng/mL  CARDIAC MARKERS ( 06 Oct 2019 05:13 )  25 ng/L / x     / x     / x     / x     / x      CARDIAC MARKERS ( 06 Oct 2019 02:52 )  25 ng/L / x     / x     / x     / x     / x                Hemoglobin A1C, Whole Blood: 6.7 % (10-07 @ 09:41)

## 2019-10-11 NOTE — H&P ADULT - PROBLEM SELECTOR PLAN 3
CAD s/p stents, remote hx of MI  ASA held last admission 2/2 GIB  Presents with NSTEMI  - c/w ASA, plavix  - will increase statin to atorvastatin 80mg qhs  - metoprolol tartrate 25mg PO BID  - consider low dose ace if hypertensive

## 2019-10-11 NOTE — CONSULT NOTE ADULT - SUBJECTIVE AND OBJECTIVE BOX
CHIEF COMPLAINT:  HPI:  89 yo hx of CD stents many years ago PVD stents in leg s/p carotid ed arteretpmy mild dementia  Cardiac stable chronic RBBB intermittent cp    recently hospitalized BRBPR transfused  discharged hct 24 creatininine 1.6  Returned to my office for urgent visit with vague cp fatigue not feeling well over last 24-4 hours  ECG changed ST elevation inferiorly ST depression precordial leads  Presently feels better no cp or sob    PAST MEDICAL & SURGICAL HISTORY:  OM (osteomyelitis)  PVD (peripheral vascular disease)  CAD (Coronary Artery Disease)  HTN (Hypertension)  HTN (Hypertension)  DM (Diabetes Mellitus)  History of total hip arthroplasty  S/P carotid endarterectomy          MEDICATIONS:  aspirin enteric coated 81 milliGRAM(s) Oral daily                  FAMILY HISTORY:  No pertinent family history in first degree relatives      SOCIAL HISTORY:    [ ] Non-smoker  [ ] Smoker  [ ] Alcohol    Allergies    No Known Allergies    Intolerances    	    PHYSICAL EXAM:  T(C): 36.7 (10-11-19 @ 20:48), Max: 36.7 (10-11-19 @ 15:30)  HR: 80 (10-11-19 @ 20:48) (78 - 93)  BP: 144/72 (10-11-19 @ 20:48) (116/75 - 150/71)  RR: 18 (10-11-19 @ 20:48) (15 - 20)  SpO2: 100% (10-11-19 @ 20:48) (94% - 100%)  Wt(kg): --  I&O's Summary      Appearance: Normal	awake alert no  further cp 3PM  HEENT:   Normal oral mucosa, PERRL, EOMI	  Cardiovascular: Normal S1 S2, No JVD, No murmurs  Respiratory: Lungs clear to auscultation	  Psychiatry: A & O x 3, Mood & affect appropriate  Gastrointestinal:  Soft, Non-tender, + BS	  Skin: No rashes, No ecchymoses, No cyanosis	  Neurologic: Non-focal  Extremities: No clubbing, cyanosis or edema  Vascular: decreased plsesdistally    TELEMETRY: 	    ECG: NSR RBBB ST elevation inferiorly ST depression 2mm across precordium 	  RADIOLOGY:    OTHER: 	  	  LABS:	 	    CARDIAC MARKERS:    Troponin T, High Sensitivity Result: 1163: Rapid upward or downward changes in high-sensitivity troponin levels  suggest acute myocardial injury. Renal impairment may cause sustained  troponin elevations.  Normal: <6 - 14 ng/L  Indeterminate: 15-51 ng/L  Elevated: > 51 ng/L  See http://labs/test/[email or web address] on the [facility] intranet for more  information ng/L (10.11.19 @ 16:30)                                  7.5    8.90  )-----------( 202      ( 11 Oct 2019 16:30 )             23.3     10-11    135  |  105  |  32<H>  ----------------------------<  256<H>  5.2   |  20<L>  |  1.39<H>    Ca    8.0<L>      11 Oct 2019 16:30  Mg     1.9     10-11    TPro  6.2  /  Alb  3.3  /  TBili  0.2  /  DBili  x   /  AST  30  /  ALT  28  /  AlkPhos  120  10-11    proBNP:   Lipid Profile:   HgA1c:   TSH:

## 2019-10-11 NOTE — ED PROVIDER NOTE - ATTENDING CONTRIBUTION TO CARE
Teddy Guerrero MD, FACEP   Patient brought in by EMS from cardiologist's office after recent discharge secondary to GIB.  Patient was noted to have ST depressions in anterior/lateral leads and Hg of 7.  Mild chest pain and left shoulder/back pain and pain to the right arm.  No f/c.   ncat  pleasant  skin with sporadic ecchymosis to arms  bilateral breath sounds   non-tachycardic, non-tachypneic  soft, ntnd,   no edema  concern for low hg in setting of Coronary Artery Disease and CABG patient will need iv, cbc, cmp, ekg, cxr, ce, pt/inr, and admission  Based on patient's history and physical exam, as well as the results of today's workup, I feel that patient warrants admission to the hospital for further workup/evaluation and continued management. I discussed the findings of today's workup with the patient and addressed the patient's questions and concerns. The patient was agreeable with admission. Our team spoke with the inpatient receiving team who accepted the patient for admission and subsequently took over the patient's care at the time of admission.

## 2019-10-11 NOTE — ED PROVIDER NOTE - CLINICAL SUMMARY MEDICAL DECISION MAKING FREE TEXT BOX
The patient is a 89 year old man with CAD presenting with chest pain and low hemoglobin. He was found to have an elevated troponin likely demand ischemia from anemia, will discuss case with cardiology.

## 2019-10-11 NOTE — H&P ADULT - NSHPSOCIALHISTORY_GEN_ALL_CORE
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

## 2019-10-11 NOTE — ED PROVIDER NOTE - CARE PLAN
Principal Discharge DX:	Other type of myocardial infarction  Goal:	admit to medicine Principal Discharge DX:	Other type of myocardial infarction  Goal:	admit to medicine  Secondary Diagnosis:	NSTEMI (non-ST elevated myocardial infarction)  Secondary Diagnosis:	Symptomatic anemia

## 2019-10-11 NOTE — H&P ADULT - PROBLEM SELECTOR PLAN 2
Recently admitted for workup of melena concerning for UGIB, ASA was held and plavix was continued due to remote history of MI and bleeding risk. H/H stable so patient did not have any GI intervention. Presents with stable H/H and HDS, reporting dark stools but denying melena.   s/p 1un PRBC in ED  - f/u Post transfusion CBC  - transfuse prn Hgb<8  - monitor for continued melena, doubt likely bleed  - CBC Q12 for now, VSQ4hr  - Protonix IV 40mg BID

## 2019-10-11 NOTE — H&P ADULT - HISTORY OF PRESENT ILLNESS
89 yo M PMHx CAD s/p stents, CEA, PVD s/p stents, dementia, GI bleed presents sent in by Cardiologist for management of chest pain and EKG changes.     In the ED  VS: Tmax 98.1F, HR 79-93, -150/64-75, RR 15-20, spO2 %  Received: 1 unit PRBC 89 yo M PMHx CAD s/p stents, CEA, PVD s/p stents, dementia, GI bleed presents sent in by Cardiologist for management of chest pain and EKG changes. Presented to his cardiologist with complaints of acute on chronic chest pain for one month and dark stools. He describes the chest pain as a bandlike squeezing pain across his chest with radiation down his left arm. EKG was performed at the cardiologist's office significant for JACQUELINE in the anterior leads and STD in the precordial leads. EMS was called due to concern for NSTEMI. ASA 81 administered in ED. Chest pain resolved after arrival to the ED. Currently denying any chest pain. Denies pleurisy, SOB, leg swelling, palpitation, or orthopnea.     Of note recently discharged on 10/9, for workup of melena, and chest pain. Chest pain was thought to be of stable angina. Patient was on  and plavix, H/H remained stable at baseline, no GI intervention. ASA was discontinued upon discharge.     In the ED  VS: Tmax 98.1F, HR 79-93, -150/64-75, RR 15-20, spO2 %  Received: 1 unit PRBC, ASA 81

## 2019-10-11 NOTE — ED ADULT NURSE REASSESSMENT NOTE - NS ED NURSE REASSESS COMMENT FT1
# 1 unit prbc's given. Consent in chart. Type and Screen resulted. Risks and benefits explained to patient. Patient verbalized understanding of risks and benefits. Patient aware of possible side effects. Vital signs stable. Second RN at bedside for confirmation.
VSS. Pt in no acute distress with son at the bedside. Hemoglobin 7.5 and Troponin 1163. Awaiting results for Type and Screen, for blood transfusion and possible admission.

## 2019-10-11 NOTE — H&P ADULT - NSHPLABSRESULTS_GEN_ALL_CORE
LABS:                        7.5    8.90  )-----------( 202      ( 11 Oct 2019 16:30 )             23.3     11 Oct 2019 16:30    135    |  105    |  32     ----------------------------<  256    5.2     |  20     |  1.39     Ca    8.0        11 Oct 2019 16:30  Mg     1.9       11 Oct 2019 16:30    TPro  6.2    /  Alb  3.3    /  TBili  0.2    /  DBili  x      /  AST  30     /  ALT  28     /  AlkPhos  120    11 Oct 2019 16:30    PT/INR - ( 11 Oct 2019 16:30 )   PT: 12.7 sec;   INR: 1.11 ratio    PTT - ( 11 Oct 2019 16:30 )  PTT:24.0 sec    Troponin T, High Sensitivity Result: 1163: Rapid upward or downward changes in high-sensitivity troponin levels  suggest acute myocardial injury. Renal impairment may cause sustained  troponin elevations.  Normal: <6 - 14 ng/L  Indeterminate: 15-51 ng/L  Elevated: > 51 ng/L  See [email or web address]/test/[email or web address] on the [facility] intranet for more  information ng/L (10.11.19 @ 16:30)    CAPILLARY BLOOD GLUCOSE  POCT Blood Glucose.: 202 mg/dL (11 Oct 2019 22:03)    BLOOD CULTURE    RADIOLOGY & ADDITIONAL TESTS:    Imaging Personally Reviewed:  [ ] YES LABS:                        7.5    8.90  )-----------( 202      ( 11 Oct 2019 16:30 )             23.3     11 Oct 2019 16:30    135    |  105    |  32     ----------------------------<  256    5.2     |  20     |  1.39     Ca    8.0        11 Oct 2019 16:30  Mg     1.9       11 Oct 2019 16:30    TPro  6.2    /  Alb  3.3    /  TBili  0.2    /  DBili  x      /  AST  30     /  ALT  28     /  AlkPhos  120    11 Oct 2019 16:30    PT/INR - ( 11 Oct 2019 16:30 )   PT: 12.7 sec;   INR: 1.11 ratio    PTT - ( 11 Oct 2019 16:30 )  PTT:24.0 sec    Troponin T, High Sensitivity Result: 1163: Rapid upward or downward changes in high-sensitivity troponin levels  suggest acute myocardial injury. Renal impairment may cause sustained  troponin elevations.  Normal: <6 - 14 ng/L  Indeterminate: 15-51 ng/L  Elevated: > 51 ng/L  See [email or web address]/test/[email or web address] on the [facility] intranet for more  information ng/L (10.11.19 @ 16:30)    CAPILLARY BLOOD GLUCOSE  POCT Blood Glucose.: 202 mg/dL (11 Oct 2019 22:03)    RADIOLOGY & ADDITIONAL TESTS:  < from: Xray Chest 1 View- PORTABLE-Urgent (10.11.19 @ 17:03) >    EXAM:  XR CHEST PORTABLE URGENT 1V                          PROCEDURE DATE:  10/11/2019      ******PRELIMINARY REPORT******    ******PRELIMINARY REPORT******          INTERPRETATION:  Persistently elevated right hemidiaphragm. Clear lungs.    ******PRELIMINARY REPORT******    ******PRELIMINARY REPORT******          SIMONE RENDON M.D., RADIOLOGY RESIDENT    Imaging Personally Reviewed:  [x] YES  EKG: JACQUELINE III, aVR, aVF; STD 1, 2, anterior leads, precordial leads, TWI v1,v6 LABS:                        7.5    8.90  )-----------( 202      ( 11 Oct 2019 16:30 )             23.3     11 Oct 2019 16:30    135    |  105    |  32     ----------------------------<  256    5.2     |  20     |  1.39     Ca    8.0        11 Oct 2019 16:30  Mg     1.9       11 Oct 2019 16:30    TPro  6.2    /  Alb  3.3    /  TBili  0.2    /  DBili  x      /  AST  30     /  ALT  28     /  AlkPhos  120    11 Oct 2019 16:30    PT/INR - ( 11 Oct 2019 16:30 )   PT: 12.7 sec;   INR: 1.11 ratio    PTT - ( 11 Oct 2019 16:30 )  PTT:24.0 sec    Troponin T, High Sensitivity Result: 1163: Rapid upward or downward changes in high-sensitivity troponin levels  suggest acute myocardial injury. Renal impairment may cause sustained  troponin elevations.  Normal: <6 - 14 ng/L  Indeterminate: 15-51 ng/L  Elevated: > 51 ng/L  See [email or web address]/test/[email or web address] on the [facility] intranet for more  information ng/L (10.11.19 @ 16:30)    CAPILLARY BLOOD GLUCOSE  POCT Blood Glucose.: 202 mg/dL (11 Oct 2019 22:03)    RADIOLOGY & ADDITIONAL TESTS:  < from: Xray Chest 1 View- PORTABLE-Urgent (10.11.19 @ 17:03) >    EXAM:  XR CHEST PORTABLE URGENT 1V                          PROCEDURE DATE:  10/11/2019      ******PRELIMINARY REPORT******    ******PRELIMINARY REPORT******          INTERPRETATION:  Persistently elevated right hemidiaphragm. Clear lungs.    ******PRELIMINARY REPORT******    ******PRELIMINARY REPORT******          SIMONE RENDON M.D., RADIOLOGY RESIDENT    Imaging Personally Reviewed:  [x] YES  EKG: JACQUELINE III, aVR, aVF; STD 1, 2, anterior leads, precordial leads, TWI v1,v6    I have reviewed the labs, imaging and ekg.

## 2019-10-11 NOTE — ED PROVIDER NOTE - OBJECTIVE STATEMENT
The patient is a 89 year old man T2DM, HTN, remote hx of MI, TIA, CAD s/p 5 stents on Plavix, presenting from outpatient cardiologist with chest pain and low hemoglobin. The patient was discharged from the hospital 2 days ago, he was then admitted for a GI bleed. Since he has had some chest pain. He went to his cardiologist on the day of presentation and was found to have new st depressions on EKG and his hemoglobin was persistently low so He was sent to the ED. In the ED the patient complains of on and off chest pain over the past few weeks. Over the past two days he has also had some upper back pain in addition to his chest pain. He has no nausea, no vomiting, no shortness of breath, no blurry vision.

## 2019-10-11 NOTE — H&P ADULT - NSHPPHYSICALEXAM_GEN_ALL_CORE
Vital Signs Last 24 Hrs  T(C): 36.7 (11 Oct 2019 20:48), Max: 36.7 (11 Oct 2019 15:30)  T(F): 98 (11 Oct 2019 20:48), Max: 98.1 (11 Oct 2019 15:30)  HR: 80 (11 Oct 2019 20:48) (78 - 93)  BP: 144/72 (11 Oct 2019 20:48) (116/75 - 150/71)  BP(mean): 82 (11 Oct 2019 19:45) (82 - 89)  RR: 18 (11 Oct 2019 20:48) (15 - 20)  SpO2: 100% (11 Oct 2019 20:48) (94% - 100%)    PHYSICAL EXAM:  GENERAL: NAD, well-groomed, well-developed  HEAD:  Atraumatic, Normocephalic  EYES: EOMI, PERRLA, conjunctiva and sclera clear  ENMT: No tonsillar erythema, exudates, or enlargement; Moist mucous membranes, Good dentition  NECK: Supple, No JVD  NERVOUS SYSTEM: AOX3, motor and sensation grossly intact in b/l UE and b/l LE  PSYCHIATRIC: Appropriate affect and mood  CHEST/LUNG: Clear to auscultation bilaterally; No rales, rhonchi, wheezing, or rubs  HEART: Regular rate and rhythm; No murmurs, rubs, or gallops. No LE edema  ABDOMEN: Soft, Nontender, Nondistended; Bowel sounds present  EXTREMITIES:  2+ Peripheral Pulses, No clubbing, cyanosis  SKIN: No rashes or lesions Vital Signs Last 24 Hrs  T(C): 36.7 (11 Oct 2019 20:48), Max: 36.7 (11 Oct 2019 15:30)  T(F): 98 (11 Oct 2019 20:48), Max: 98.1 (11 Oct 2019 15:30)  HR: 80 (11 Oct 2019 20:48) (78 - 93)  BP: 144/72 (11 Oct 2019 20:48) (116/75 - 150/71)  BP(mean): 82 (11 Oct 2019 19:45) (82 - 89)  RR: 18 (11 Oct 2019 20:48) (15 - 20)  SpO2: 100% (11 Oct 2019 20:48) (94% - 100%)    PHYSICAL EXAM:  GENERAL: NAD, pleasant, cheerful   HEAD:  Atraumatic, Normocephalic  EYES: EOMI, conjunctival pallor   ENMT: No tonsillar erythema, exudates, or enlargement; dry mucous membranes  NECK: Supple, No JVD  NERVOUS SYSTEM: AOX3, motor and sensation grossly intact in b/l UE and b/l LE  CHEST/LUNG: Clear to auscultation bilaterally; No rales, rhonchi, wheezing, or rubs  HEART: Regular rhythm; No murmurs, rubs, or gallops. No LE edema  ABDOMEN: Soft, Nontender, Nondistended; Bowel sounds present  EXTREMITIES:  2+ Peripheral Pulses, No clubbing, cyanosis  SKIN: No rashes or lesions

## 2019-10-11 NOTE — ED ADULT NURSE NOTE - OBJECTIVE STATEMENT
89 yo Male with PMH of heart disease, PVD, GI bleed and Diabetes and PSHX of multiple stents placed in heart as well as peripherally,  presents from the cardiology office, with ischemic changes on his EKG. He went to the cardiology office because he has been having intermittent chest pain that radiates down his arm and to his back, reports as a dull pain 2/10, since his discharge 2 days ago. Pt complains of fatigue, weakness and increased SOB on exertion.  Pt received a transfusion "sunday or monday". Pt reports having stools that are a little loose and "so dark basically black". pt denies n/v/d, fever, chills and palpitations.  On assessment pt AOx3, lung bilaterally CTA, abdomen soft and nontender, nondistended. Bowel sounds in all 4 quadrants. NSR on monitor., S1 and S2 heard, no JVD, peripheral pulses equal and strong bilaterally.  MD assessed and evaluated pt, labs drawn, IV 20g placed R arm, 18g L hand from EMS., waiting Chest XR. Will continue to monitor

## 2019-10-11 NOTE — H&P ADULT - PROBLEM SELECTOR PLAN 5
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

## 2019-10-11 NOTE — H&P ADULT - PROBLEM SELECTOR PLAN 4
CKD without UDAY creatinine at baseline  - would benefit from ACE/ARB when medically stable   - may also benefit for SGLT2 inhibitor given DM, CKD (unknown presence of albuminuria)

## 2019-10-11 NOTE — ED PROVIDER NOTE - PHYSICAL EXAMINATION
PHYSICAL EXAM:  T(C): 36.7 (10-11-19 @ 16:19), Max: 36.7 (10-11-19 @ 15:30)  HR: 93 (10-11-19 @ 16:19) (81 - 93)  BP: 142/74 (10-11-19 @ 16:19) (127/75 - 150/71)  RR: 20 (10-11-19 @ 16:19) (16 - 20)  SpO2: 100% (10-11-19 @ 16:19) (94% - 100%)  GENERAL: NAD, well-developed  HEAD:  Atraumatic, Normocephalic  EYES: EOMI, PERRLA, conjunctiva and sclera clear  NECK: Supple, No JVD  CHEST/LUNG: Clear to auscultation bilaterally; No wheeze  HEART: Regular rate and rhythm; No murmurs, rubs, or gallops  ABDOMEN: Soft, Nontender, Nondistended; Bowel sounds present  EXTREMITIES:  2+ Peripheral Pulses, No clubbing, cyanosis, or edema  PSYCH: AAOx3  NEUROLOGY: non-focal  SKIN: No rashes or lesions

## 2019-10-11 NOTE — CONSULT NOTE ADULT - ASSESSMENT
1.acute inferoposterior MI   2. hemodynamically stable  3. s/pGI bleed  4. CRI    Recommend  conservative therapy for ACS hold off heparin inlight of recent GI bleed continue asaand plavix  restart beta bocker lopressor 25 BID  echo  will follow closely

## 2019-10-11 NOTE — H&P ADULT - NSHPREVIEWOFSYSTEMS_GEN_ALL_CORE
CONSTITUTIONAL: No fever, weight loss, or fatigue  EYES: No eye pain, visual disturbances, or discharge  ENMT: No difficulty hearing, tinnitus, vertigo; No sinus or throat pain  RESPIRATORY: No cough, wheezing, chills or hemoptysis; No shortness of breath  CARDIOVASCULAR: No chest pain, palpitations, dizziness, or leg swelling  GASTROINTESTINAL: No abdominal or epigastric pain. No nausea, vomiting, or hematemesis; No diarrhea or constipation. No melena or hematochezia.  GENITOURINARY: No dysuria, frequency, hematuria, or incontinence  NEUROLOGICAL: No headaches, loss of strength, numbness, or tremors  SKIN: No itching, burning, rashes, or lesions   LYMPH NODES: No enlarged glands  ENDOCRINE: No heat or cold intolerance; No polydipsia or polyuria  MUSCULOSKELETAL: No joint pain or swelling;   PSYCHIATRIC: Denies depression, anxiety  HEME/LYMPH: No easy bruising, or bleeding gums  ALLERGY AND IMMUNOLOGIC: No hives or eczema standing/toileting/walking CONSTITUTIONAL: No fever, weight loss, or fatigue  EYES: No visual disturbances, no blurry vision  ENMT: +difficulty hearing  RESPIRATORY: No cough, wheezing, chills or hemoptysis; No shortness of breath  CARDIOVASCULAR: +chest pain  GASTROINTESTINAL: +dark stools   GENITOURINARY: No dysuria, frequency, hematuria, or incontinence  NEUROLOGICAL: No headaches, loss of strength, numbness, or tremors  SKIN: No itching, burning, rashes, or lesions   LYMPH NODES: No enlarged glands  ENDOCRINE: No heat or cold intolerance; No polydipsia or polyuria  MUSCULOSKELETAL: No joint pain or swelling;   PSYCHIATRIC: Denies depression, anxiety  HEME/LYMPH: No easy bruising, or bleeding gums  ALLERGY AND IMMUNOLOGIC: No hives or eczema

## 2019-10-11 NOTE — H&P ADULT - NSICDXPASTSURGICALHX_GEN_ALL_CORE_FT
PAST SURGICAL HISTORY:  History of total hip arthroplasty     S/P carotid endarterectomy PAST SURGICAL HISTORY:  History of total hip arthroplasty     S/P carotid endarterectomy     S/P peripheral artery angioplasty with stent placement left superficial femoral artery

## 2019-10-11 NOTE — H&P ADULT - PROBLEM SELECTOR PLAN 1
JACQUELINE in anterior leads, STD in precordial leads, appears to be diffuse on repeat EKG here. Troponin 1163   - Given recent history of GIB will management conservatively without heparin gtt  - will restart ASA at lower dose - 81mg po daily   - c/w plavix 75 mg po daily   - c/w metoprolol tartrate 25mg po BID  - TTE  - further care per primary Cardiologist Dr. Holman JACQUELINE in anterior leads, STD in precordial leads, appears to be diffuse on repeat EKG here. Troponin 1163. ST depression MI. Appreciate cardiology and CCU recommendations.   - Given recent history of GIB will management conservatively without heparin gtt  - will restart ASA at lower dose - 81mg po daily   - c/w plavix 75 mg po daily   - c/w metoprolol tartrate 25mg po BID  - TTE  - further care per primary Cardiologist Dr. Holman

## 2019-10-12 DIAGNOSIS — Z95.820 PERIPHERAL VASCULAR ANGIOPLASTY STATUS WITH IMPLANTS AND GRAFTS: Chronic | ICD-10-CM

## 2019-10-12 LAB
ANION GAP SERPL CALC-SCNC: 8 MMOL/L — SIGNIFICANT CHANGE UP (ref 5–17)
BASOPHILS # BLD AUTO: 0.03 K/UL — SIGNIFICANT CHANGE UP (ref 0–0.2)
BASOPHILS NFR BLD AUTO: 0.4 % — SIGNIFICANT CHANGE UP (ref 0–2)
BUN SERPL-MCNC: 28 MG/DL — HIGH (ref 7–23)
CALCIUM SERPL-MCNC: 8.5 MG/DL — SIGNIFICANT CHANGE UP (ref 8.4–10.5)
CHLORIDE SERPL-SCNC: 106 MMOL/L — SIGNIFICANT CHANGE UP (ref 96–108)
CO2 SERPL-SCNC: 25 MMOL/L — SIGNIFICANT CHANGE UP (ref 22–31)
CREAT SERPL-MCNC: 1.35 MG/DL — HIGH (ref 0.5–1.3)
EOSINOPHIL # BLD AUTO: 0.24 K/UL — SIGNIFICANT CHANGE UP (ref 0–0.5)
EOSINOPHIL NFR BLD AUTO: 3.1 % — SIGNIFICANT CHANGE UP (ref 0–6)
GLUCOSE BLDC GLUCOMTR-MCNC: 184 MG/DL — HIGH (ref 70–99)
GLUCOSE BLDC GLUCOMTR-MCNC: 204 MG/DL — HIGH (ref 70–99)
GLUCOSE BLDC GLUCOMTR-MCNC: 281 MG/DL — HIGH (ref 70–99)
GLUCOSE BLDC GLUCOMTR-MCNC: 324 MG/DL — HIGH (ref 70–99)
GLUCOSE SERPL-MCNC: 193 MG/DL — HIGH (ref 70–99)
HCT VFR BLD CALC: 26.1 % — LOW (ref 39–50)
HGB BLD-MCNC: 8.1 G/DL — LOW (ref 13–17)
IMM GRANULOCYTES NFR BLD AUTO: 0.4 % — SIGNIFICANT CHANGE UP (ref 0–1.5)
LYMPHOCYTES # BLD AUTO: 1.79 K/UL — SIGNIFICANT CHANGE UP (ref 1–3.3)
LYMPHOCYTES # BLD AUTO: 23.1 % — SIGNIFICANT CHANGE UP (ref 13–44)
MAGNESIUM SERPL-MCNC: 1.9 MG/DL — SIGNIFICANT CHANGE UP (ref 1.6–2.6)
MCHC RBC-ENTMCNC: 31 GM/DL — LOW (ref 32–36)
MCHC RBC-ENTMCNC: 32.5 PG — SIGNIFICANT CHANGE UP (ref 27–34)
MCV RBC AUTO: 104.8 FL — HIGH (ref 80–100)
MONOCYTES # BLD AUTO: 0.91 K/UL — HIGH (ref 0–0.9)
MONOCYTES NFR BLD AUTO: 11.7 % — SIGNIFICANT CHANGE UP (ref 2–14)
NEUTROPHILS # BLD AUTO: 4.75 K/UL — SIGNIFICANT CHANGE UP (ref 1.8–7.4)
NEUTROPHILS NFR BLD AUTO: 61.3 % — SIGNIFICANT CHANGE UP (ref 43–77)
NT-PROBNP SERPL-SCNC: HIGH PG/ML (ref 0–300)
PHOSPHATE SERPL-MCNC: 3.6 MG/DL — SIGNIFICANT CHANGE UP (ref 2.5–4.5)
PLATELET # BLD AUTO: 178 K/UL — SIGNIFICANT CHANGE UP (ref 150–400)
POTASSIUM SERPL-MCNC: 4.8 MMOL/L — SIGNIFICANT CHANGE UP (ref 3.5–5.3)
POTASSIUM SERPL-SCNC: 4.8 MMOL/L — SIGNIFICANT CHANGE UP (ref 3.5–5.3)
RBC # BLD: 2.49 M/UL — LOW (ref 4.2–5.8)
RBC # FLD: 18.3 % — HIGH (ref 10.3–14.5)
SODIUM SERPL-SCNC: 139 MMOL/L — SIGNIFICANT CHANGE UP (ref 135–145)
TROPONIN T, HIGH SENSITIVITY RESULT: 1371 NG/L — HIGH (ref 0–51)
WBC # BLD: 7.75 K/UL — SIGNIFICANT CHANGE UP (ref 3.8–10.5)
WBC # FLD AUTO: 7.75 K/UL — SIGNIFICANT CHANGE UP (ref 3.8–10.5)

## 2019-10-12 PROCEDURE — 99233 SBSQ HOSP IP/OBS HIGH 50: CPT

## 2019-10-12 RX ORDER — ATORVASTATIN CALCIUM 80 MG/1
80 TABLET, FILM COATED ORAL AT BEDTIME
Refills: 0 | Status: DISCONTINUED | OUTPATIENT
Start: 2019-10-12 | End: 2019-10-22

## 2019-10-12 RX ORDER — DEXTROSE 50 % IN WATER 50 %
25 SYRINGE (ML) INTRAVENOUS ONCE
Refills: 0 | Status: DISCONTINUED | OUTPATIENT
Start: 2019-10-12 | End: 2019-10-22

## 2019-10-12 RX ORDER — DEXTROSE 50 % IN WATER 50 %
12.5 SYRINGE (ML) INTRAVENOUS ONCE
Refills: 0 | Status: DISCONTINUED | OUTPATIENT
Start: 2019-10-12 | End: 2019-10-22

## 2019-10-12 RX ORDER — DEXTROSE 50 % IN WATER 50 %
15 SYRINGE (ML) INTRAVENOUS ONCE
Refills: 0 | Status: DISCONTINUED | OUTPATIENT
Start: 2019-10-12 | End: 2019-10-22

## 2019-10-12 RX ORDER — DONEPEZIL HYDROCHLORIDE 10 MG/1
10 TABLET, FILM COATED ORAL AT BEDTIME
Refills: 0 | Status: DISCONTINUED | OUTPATIENT
Start: 2019-10-12 | End: 2019-10-22

## 2019-10-12 RX ORDER — INSULIN LISPRO 100/ML
VIAL (ML) SUBCUTANEOUS
Refills: 0 | Status: DISCONTINUED | OUTPATIENT
Start: 2019-10-12 | End: 2019-10-22

## 2019-10-12 RX ORDER — CHOLECALCIFEROL (VITAMIN D3) 125 MCG
1000 CAPSULE ORAL DAILY
Refills: 0 | Status: DISCONTINUED | OUTPATIENT
Start: 2019-10-12 | End: 2019-10-22

## 2019-10-12 RX ORDER — SODIUM CHLORIDE 9 MG/ML
1000 INJECTION, SOLUTION INTRAVENOUS
Refills: 0 | Status: DISCONTINUED | OUTPATIENT
Start: 2019-10-12 | End: 2019-10-16

## 2019-10-12 RX ORDER — PANTOPRAZOLE SODIUM 20 MG/1
40 TABLET, DELAYED RELEASE ORAL
Refills: 0 | Status: DISCONTINUED | OUTPATIENT
Start: 2019-10-12 | End: 2019-10-16

## 2019-10-12 RX ORDER — DULOXETINE HYDROCHLORIDE 30 MG/1
30 CAPSULE, DELAYED RELEASE ORAL DAILY
Refills: 0 | Status: DISCONTINUED | OUTPATIENT
Start: 2019-10-12 | End: 2019-10-14

## 2019-10-12 RX ORDER — INSULIN GLARGINE 100 [IU]/ML
14 INJECTION, SOLUTION SUBCUTANEOUS AT BEDTIME
Refills: 0 | Status: DISCONTINUED | OUTPATIENT
Start: 2019-10-12 | End: 2019-10-16

## 2019-10-12 RX ORDER — GLUCAGON INJECTION, SOLUTION 0.5 MG/.1ML
1 INJECTION, SOLUTION SUBCUTANEOUS ONCE
Refills: 0 | Status: DISCONTINUED | OUTPATIENT
Start: 2019-10-12 | End: 2019-10-22

## 2019-10-12 RX ADMIN — Medication 25 MILLIGRAM(S): at 05:35

## 2019-10-12 RX ADMIN — Medication 4: at 12:51

## 2019-10-12 RX ADMIN — PANTOPRAZOLE SODIUM 40 MILLIGRAM(S): 20 TABLET, DELAYED RELEASE ORAL at 17:47

## 2019-10-12 RX ADMIN — ATORVASTATIN CALCIUM 80 MILLIGRAM(S): 80 TABLET, FILM COATED ORAL at 21:35

## 2019-10-12 RX ADMIN — Medication 81 MILLIGRAM(S): at 12:50

## 2019-10-12 RX ADMIN — Medication 1: at 08:59

## 2019-10-12 RX ADMIN — DULOXETINE HYDROCHLORIDE 30 MILLIGRAM(S): 30 CAPSULE, DELAYED RELEASE ORAL at 12:51

## 2019-10-12 RX ADMIN — Medication 300 MILLIGRAM(S): at 21:35

## 2019-10-12 RX ADMIN — CLOPIDOGREL BISULFATE 75 MILLIGRAM(S): 75 TABLET, FILM COATED ORAL at 12:51

## 2019-10-12 RX ADMIN — DONEPEZIL HYDROCHLORIDE 10 MILLIGRAM(S): 10 TABLET, FILM COATED ORAL at 21:34

## 2019-10-12 RX ADMIN — Medication 2: at 17:45

## 2019-10-12 RX ADMIN — PANTOPRAZOLE SODIUM 40 MILLIGRAM(S): 20 TABLET, DELAYED RELEASE ORAL at 05:35

## 2019-10-12 RX ADMIN — INSULIN GLARGINE 14 UNIT(S): 100 INJECTION, SOLUTION SUBCUTANEOUS at 21:34

## 2019-10-12 RX ADMIN — Medication 25 MILLIGRAM(S): at 17:44

## 2019-10-12 RX ADMIN — Medication 1000 UNIT(S): at 12:51

## 2019-10-12 NOTE — PROGRESS NOTE ADULT - SUBJECTIVE AND OBJECTIVE BOX
Patient is a 89y old  Male who presents with a chief complaint of NSTEMI (12 Oct 2019 09:18)      SUBJECTIVE / OVERNIGHT EVENTS: 89M PMHx CAD s/p stents, PVD s/p stents, CEA, dementia, GI bleed presents from Cardiologist for chest pain, EKG findings impressive for NSTEMI. Patient evaluated at bedside. Patient states that he has an intermittent cough occasionally productive of clear sputum. Denies chest pain, ABD pain, EXT pain, SOB, HA, lightheadedness, dizziness.    CONSTITUTIONAL: No weakness, fevers or chills  EYES/ENT: No visual changes;  No vertigo or throat pain   NECK: No pain or stiffness  RESPIRATORY: +cough. No wheezing, hemoptysis; No shortness of breath  CARDIOVASCULAR: No chest pain or palpitations  GASTROINTESTINAL: No abdominal or epigastric pain. No nausea, vomiting, or hematemesis; No diarrhea or constipation. No melena or hematochezia.  GENITOURINARY: No dysuria, frequency or hematuria  NEUROLOGICAL: No numbness or weakness  SKIN: No itching, burning, rashes, or lesions   All other review of systems is negative unless indicated above.	    MEDICATIONS  (STANDING):  aspirin enteric coated 81 milliGRAM(s) Oral daily  atorvastatin 80 milliGRAM(s) Oral at bedtime  cholecalciferol 1000 Unit(s) Oral daily  clopidogrel Tablet 75 milliGRAM(s) Oral daily  dextrose 5%. 1000 milliLiter(s) (50 mL/Hr) IV Continuous <Continuous>  dextrose 50% Injectable 12.5 Gram(s) IV Push once  dextrose 50% Injectable 25 Gram(s) IV Push once  dextrose 50% Injectable 25 Gram(s) IV Push once  donepezil 10 milliGRAM(s) Oral at bedtime  DULoxetine 30 milliGRAM(s) Oral daily  insulin glargine Injectable (LANTUS) 14 Unit(s) SubCutaneous at bedtime  insulin lispro (HumaLOG) corrective regimen sliding scale   SubCutaneous three times a day before meals  metoprolol tartrate 25 milliGRAM(s) Oral two times a day  pantoprazole  Injectable 40 milliGRAM(s) IV Push two times a day  phenytoin   Capsule 300 milliGRAM(s) Oral at bedtime    MEDICATIONS  (PRN):  dextrose 40% Gel 15 Gram(s) Oral once PRN Blood Glucose LESS THAN 70 milliGRAM(s)/deciliter  glucagon  Injectable 1 milliGRAM(s) IntraMuscular once PRN Glucose LESS THAN 70 milligrams/deciliter      Vital Signs Last 24 Hrs  T(C): 37.1 (12 Oct 2019 04:56), Max: 37.1 (12 Oct 2019 04:56)  T(F): 98.7 (12 Oct 2019 04:56), Max: 98.7 (12 Oct 2019 04:56)  HR: 88 (12 Oct 2019 04:56) (78 - 93)  BP: 124/66 (12 Oct 2019 04:56) (116/75 - 150/71)  BP(mean): 82 (11 Oct 2019 19:45) (82 - 89)  RR: 18 (12 Oct 2019 04:56) (15 - 20)  SpO2: 98% (12 Oct 2019 04:56) (94% - 100%)  CAPILLARY BLOOD GLUCOSE      POCT Blood Glucose.: 184 mg/dL (12 Oct 2019 08:50)  POCT Blood Glucose.: 202 mg/dL (11 Oct 2019 22:03)    I&O's Summary    11 Oct 2019 07:01  -  12 Oct 2019 07:00  --------------------------------------------------------  IN: 0 mL / OUT: 220 mL / NET: -220 mL        PHYSICAL EXAM:  Vital Signs Last 24 Hrs  T(C): 37.1 (10-12-19 @ 04:56)  T(F): 98.7 (10-12-19 @ 04:56), Max: 98.7 (10-12-19 @ 04:56)  HR: 88 (10-12-19 @ 04:56) (78 - 93)  BP: 124/66 (10-12-19 @ 04:56)  BP(mean): 82 (10-11-19 @ 19:45) (82 - 89)  RR: 18 (10-12-19 @ 04:56) (15 - 20)  SpO2: 98% (10-12-19 @ 04:56) (94% - 100%)  Wt(kg): --    10-11 @ 07:01  -  10-12 @ 07:00  --------------------------------------------------------  IN: 0 mL / OUT: 220 mL / NET: -220 mL      Constitutional: NAD, awake and alert  EYES: +PERRL, +EOMI, no scleral icterus  ENT: No tonsillar exuda   Neck: Soft and supple , no thyromegaly   Respiratory: Breath sounds are clear bilaterally, No wheezing, rales or rhonchi  Cardiovascular: S1 and S2, regular rate and rhythm, no Murmurs, gallops or rubs, no JVD,    Gastrointestinal: Bowel Sounds present, soft, nontender, nondistended, no guarding, no rebound  Extremities: No cyanosis or clubbing; warm to touch  Vascular: 2+ peripheral pulses lower ex  Neurological: No focal deficits, CN II-XII intact bilaterally, sensation to light touch intact in all extremities, gait intact. Pupils are equally reactive to light and symmetrical in size.   Musculoskeletal: 5/5 strength b/l upper and lower extremities; no joint swelling.  Skin: No rashes  Psych: no depression or anhedonia, AAOx3  HEME: no bruises, no nose bleeds      LABS:                        7.5    8.90  )-----------( 202      ( 11 Oct 2019 16:30 )             23.3     10-12    139  |  106  |  28<H>  ----------------------------<  193<H>  4.8   |  25  |  1.35<H>    Ca    8.5      12 Oct 2019 05:55  Phos  3.6     10-12  Mg     1.9     10-12    TPro  6.2  /  Alb  3.3  /  TBili  0.2  /  DBili  x   /  AST  30  /  ALT  28  /  AlkPhos  120  10-11    PT/INR - ( 11 Oct 2019 16:30 )   PT: 12.7 sec;   INR: 1.11 ratio         PTT - ( 11 Oct 2019 16:30 )  PTT:24.0 sec  CARDIAC MARKERS ( 11 Oct 2019 16:30 )  x     / x     / x     / x     / 5.2 ng/mL          RADIOLOGY & ADDITIONAL TESTS:    Imaging Personally Reviewed:    Consultant(s) Notes Reviewed:      Care Discussed with Consultants/Other Providers: Patient is a 89y old  Male who presents with a chief complaint of NSTEMI (12 Oct 2019 09:18)      SUBJECTIVE / OVERNIGHT EVENTS: 89M PMHx CAD s/p stents, PVD s/p stents, CEA, dementia, GI bleed presents from Cardiologist for chest pain, EKG findings impressive for NSTEMI. Patient evaluated at bedside. Patient states that he has an intermittent cough occasionally productive of clear sputum. Denies chest pain, ABD pain, EXT pain, SOB, HA, lightheadedness, dizziness.    CONSTITUTIONAL: No weakness, fevers or chills  EYES/ENT: No visual changes;  No vertigo or throat pain   NECK: No pain or stiffness  RESPIRATORY: +cough. No wheezing, hemoptysis; No shortness of breath  CARDIOVASCULAR: No chest pain or palpitations  GASTROINTESTINAL: No abdominal or epigastric pain. No nausea, vomiting, or hematemesis; No diarrhea or constipation. No melena or hematochezia.  GENITOURINARY: No dysuria, frequency or hematuria  NEUROLOGICAL: No numbness or weakness  SKIN: No itching, burning, rashes, or lesions   All other review of systems is negative unless indicated above.	    MEDICATIONS  (STANDING):  aspirin enteric coated 81 milliGRAM(s) Oral daily  atorvastatin 80 milliGRAM(s) Oral at bedtime  cholecalciferol 1000 Unit(s) Oral daily  clopidogrel Tablet 75 milliGRAM(s) Oral daily  dextrose 5%. 1000 milliLiter(s) (50 mL/Hr) IV Continuous <Continuous>  dextrose 50% Injectable 12.5 Gram(s) IV Push once  dextrose 50% Injectable 25 Gram(s) IV Push once  dextrose 50% Injectable 25 Gram(s) IV Push once  donepezil 10 milliGRAM(s) Oral at bedtime  DULoxetine 30 milliGRAM(s) Oral daily  insulin glargine Injectable (LANTUS) 14 Unit(s) SubCutaneous at bedtime  insulin lispro (HumaLOG) corrective regimen sliding scale   SubCutaneous three times a day before meals  metoprolol tartrate 25 milliGRAM(s) Oral two times a day  pantoprazole  Injectable 40 milliGRAM(s) IV Push two times a day  phenytoin   Capsule 300 milliGRAM(s) Oral at bedtime    MEDICATIONS  (PRN):  dextrose 40% Gel 15 Gram(s) Oral once PRN Blood Glucose LESS THAN 70 milliGRAM(s)/deciliter  glucagon  Injectable 1 milliGRAM(s) IntraMuscular once PRN Glucose LESS THAN 70 milligrams/deciliter      Vital Signs Last 24 Hrs  T(C): 37.1 (12 Oct 2019 04:56), Max: 37.1 (12 Oct 2019 04:56)  T(F): 98.7 (12 Oct 2019 04:56), Max: 98.7 (12 Oct 2019 04:56)  HR: 88 (12 Oct 2019 04:56) (78 - 93)  BP: 124/66 (12 Oct 2019 04:56) (116/75 - 150/71)  BP(mean): 82 (11 Oct 2019 19:45) (82 - 89)  RR: 18 (12 Oct 2019 04:56) (15 - 20)  SpO2: 98% (12 Oct 2019 04:56) (94% - 100%)  CAPILLARY BLOOD GLUCOSE      POCT Blood Glucose.: 184 mg/dL (12 Oct 2019 08:50)  POCT Blood Glucose.: 202 mg/dL (11 Oct 2019 22:03)    I&O's Summary    11 Oct 2019 07:01  -  12 Oct 2019 07:00  --------------------------------------------------------  IN: 0 mL / OUT: 220 mL / NET: -220 mL        PHYSICAL EXAM:  Vital Signs Last 24 Hrs  T(C): 37.1 (10-12-19 @ 04:56)  T(F): 98.7 (10-12-19 @ 04:56), Max: 98.7 (10-12-19 @ 04:56)  HR: 88 (10-12-19 @ 04:56) (78 - 93)  BP: 124/66 (10-12-19 @ 04:56)  BP(mean): 82 (10-11-19 @ 19:45) (82 - 89)  RR: 18 (10-12-19 @ 04:56) (15 - 20)  SpO2: 98% (10-12-19 @ 04:56) (94% - 100%)  Wt(kg): --    10-11 @ 07:01  -  10-12 @ 07:00  --------------------------------------------------------  IN: 0 mL / OUT: 220 mL / NET: -220 mL      Constitutional: NAD, awake and alert  EYES: +PERRL, +EOMI, no scleral icterus  ENT: Moist oral mucosa  Neck: Supple  Respiratory: CTAB. No wheezing, rales or rhonchi  Cardiovascular: S1/S2, RRR. No murmurs, gallops or rubs, no JVD,    Gastrointestinal: Soft, nontender, nondistended, +BS  Extremities: No EXT edema  Vascular: Pulses x 4EXT  Neurological: No focal deficits. A&Ox3  Musculoskeletal: 5/5 strength b/l upper and lower extremities; no joint swelling.  Skin: No rashes      LABS:                        7.5    8.90  )-----------( 202      ( 11 Oct 2019 16:30 )             23.3     10-12    139  |  106  |  28<H>  ----------------------------<  193<H>  4.8   |  25  |  1.35<H>    Ca    8.5      12 Oct 2019 05:55  Phos  3.6     10-12  Mg     1.9     10-12    TPro  6.2  /  Alb  3.3  /  TBili  0.2  /  DBili  x   /  AST  30  /  ALT  28  /  AlkPhos  120  10-11    PT/INR - ( 11 Oct 2019 16:30 )   PT: 12.7 sec;   INR: 1.11 ratio         PTT - ( 11 Oct 2019 16:30 )  PTT:24.0 sec  CARDIAC MARKERS ( 11 Oct 2019 16:30 )  x     / x     / x     / x     / 5.2 ng/mL          RADIOLOGY & ADDITIONAL TESTS:    Imaging Personally Reviewed:    Consultant(s) Notes Reviewed:      Care Discussed with Consultants/Other Providers:

## 2019-10-12 NOTE — PROGRESS NOTE ADULT - PROBLEM SELECTOR PLAN 4
- CKD without UDAY creatinine at baseline  - ACE/ARB pending TTE  - Consider SGLT2 inhibitor given DM, CKD (unknown presence of albuminuria)

## 2019-10-12 NOTE — PROGRESS NOTE ADULT - ASSESSMENT
1. s/p NST elevation MI  2. heodynamically stable  3. anemia s/p GI bleed  4. elevated BNP but clinically no overt CHF    Recommend   repeat ecg  2 D echo  continue ASA and plavix  trend Hgb/hct  continue beta blocker  pending on LV function add ACE or ARB  OOB to chair  discharge in 48 hours if stable

## 2019-10-12 NOTE — GOALS OF CARE CONVERSATION - ADVANCED CARE PLANNING - CONVERSATION DETAILS
Patient and son brought in MOLST form from last hospitalization, stating that patient would not like CPR or intubation should it be necessary.  Expressed understanding that natural death would occur should this happen.

## 2019-10-12 NOTE — PROGRESS NOTE ADULT - PROBLEM SELECTOR PLAN 3
- CAD s/p stents, remote hx of MI  - ASA held last admission 2/2 GIB  - Presents with NSTEMI  - c/w ASA, plavix  - Atorvastatin 80mg qhs  - Metoprolol tartrate 25mg PO BID  - ACE/ARB pending TTE results - CAD s/p stents, remote hx of MI  - ASA held last admission 2/2 GIB  - Presents with NSTEMI  - c/w ASA, plavix  - Atorvastatin 80mg QHS  - Metoprolol tartrate 25mg PO BID  - ACE/ARB pending TTE results

## 2019-10-12 NOTE — PROGRESS NOTE ADULT - PROBLEM SELECTOR PLAN 2
- Recently admitted for workup of melena concerning for UGIB; ASA held, plavix continued. Hgb stable so no intervention. Reports dark stools  - Received s/p 1U PRBC in ED  - Post transfusion CBC  - FOBT pending  - CBC Q12 for now, VSQ4hr  - Transfuse prn Hgb<8  - Protonix IV 40mg BID - Recently admitted for workup of melena concerning for UGIB; ASA held, plavix continued. Hgb stable so no intervention. Reports dark stools  - Received s/p 1U PRBC in ED  - Post transfusion CBC  - F/u FOBT, GI consult pending  - CBC Q12 for now, VSQ4hr  - Transfuse prn Hgb<8  - Protonix IV 40mg BID

## 2019-10-12 NOTE — PROGRESS NOTE ADULT - PROBLEM SELECTOR PLAN 5
- Home dependent Lantus 22 units QHS, 10 units premeal  - Most recent admission was lantus 14 units qhs, and 6units premeal  - Continue with lantus 14units qhs, monitor off premeal insulin (can be restarted if persistently hyperglycemic)  - ISS - Home dependent Lantus 22U QHS, 10U premeal  - Most recent admission was Lantus 14U QHS, and 6U premeal  - Continue with lantus 14U QHS monitor off premeal insulin (can be restarted if persistently hyperglycemic)  - ISS

## 2019-10-12 NOTE — PROGRESS NOTE ADULT - ASSESSMENT
89 yo M PMHx CAD s/p stents, CEA, PVD s/p stents, dementia, GI bleed presents sent in by Cardiologist for management of NSTEMI. 89 yo M PMHx CAD s/p stents, CEA, PVD s/p stents, dementia, GI bleed presents sent in by Cardiologist for management of NSTEMI. Patient clinically stable, being followed by outpatient Cardiologist Dr. Holman.

## 2019-10-12 NOTE — PROGRESS NOTE ADULT - SUBJECTIVE AND OBJECTIVE BOX
Subjective: Patient seen and examined. No new events except as noted.   Feels well no more cp nop sob no palpitations  received 1 unit RBC  positive troponin NSTelevation MI  MEDICATIONS:  MEDICATIONS  (STANDING):  aspirin enteric coated 81 milliGRAM(s) Oral daily  atorvastatin 80 milliGRAM(s) Oral at bedtime  cholecalciferol 1000 Unit(s) Oral daily  clopidogrel Tablet 75 milliGRAM(s) Oral daily  dextrose 5%. 1000 milliLiter(s) (50 mL/Hr) IV Continuous <Continuous>  dextrose 50% Injectable 12.5 Gram(s) IV Push once  dextrose 50% Injectable 25 Gram(s) IV Push once  dextrose 50% Injectable 25 Gram(s) IV Push once  donepezil 10 milliGRAM(s) Oral at bedtime  DULoxetine 30 milliGRAM(s) Oral daily  insulin glargine Injectable (LANTUS) 14 Unit(s) SubCutaneous at bedtime  insulin lispro (HumaLOG) corrective regimen sliding scale   SubCutaneous three times a day before meals  metoprolol tartrate 25 milliGRAM(s) Oral two times a day  pantoprazole  Injectable 40 milliGRAM(s) IV Push two times a day  phenytoin   Capsule 300 milliGRAM(s) Oral at bedtime      PHYSICAL EXAM:  T(C): 37.1 (10-12-19 @ 04:56), Max: 37.1 (10-12-19 @ 04:56)  HR: 88 (10-12-19 @ 04:56) (78 - 93)  BP: 124/66 (10-12-19 @ 04:56) (116/75 - 150/71)  RR: 18 (10-12-19 @ 04:56) (15 - 20)  SpO2: 98% (10-12-19 @ 04:56) (94% - 100%)  Wt(kg): --  I&O's Summary    11 Oct 2019 07:01  -  12 Oct 2019 07:00  --------------------------------------------------------  IN: 0 mL / OUT: 220 mL / NET: -220 mL      Height (cm): 179.1 (10-11 @ 20:48)  Weight (kg): 71.5 (10-11 @ 20:48)  BMI (kg/m2): 22.3 (10-11 @ 20:48)  BSA (m2): 1.9 (10-11 @ 20:48)    Appearance: Normal	no cp in good spirits fully alert and oriented  HEENT:   Normal oral mucosa, PERRL, EOMI	  Cardiovascular: Normal S1 S2, No JVD, No murmurs ,n s3  Respiratory: Lungs clear to auscultation, normal effort 	  Gastrointestinal:  Soft, Non-tender, + BS	  Ext: trace edema  Peripheral pulses decreased bilaterally      LABS:    CARDIAC MARKERS:  CARDIAC MARKERS ( 11 Oct 2019 16:30 )  x     / x     / x     / x     / 5.2 ng/mL                                7.5    8.90  )-----------( 202      ( 11 Oct 2019 16:30 )             23.3     10-12    139  |  106  |  28<H>  ----------------------------<  193<H>  4.8   |  25  |  1.35<H>    Ca    8.5      12 Oct 2019 05:55  Phos  3.6     10-12  Mg     1.9     10-12    TPro  6.2  /  Alb  3.3  /  TBili  0.2  /  DBili  x   /  AST  30  /  ALT  28  /  AlkPhos  120  10-11    proBNP: Serum Pro-Brain Natriuretic Peptide: 49115 pg/mL (10-12 @ 05:55)    Lipid Profile:   HgA1c:   TSH:           TELEMETRY: 	    ECG:  	NSR RBBB ST depression precordial  slight ST elevation inferiorly  RADIOLOGY:   DIAGNOSTIC TESTING:  [ ] Echocardiogram:  [ ]  Catheterization:  [ ] Stress Test:    OTHER:

## 2019-10-13 LAB
ANION GAP SERPL CALC-SCNC: 11 MMOL/L — SIGNIFICANT CHANGE UP (ref 5–17)
BUN SERPL-MCNC: 33 MG/DL — HIGH (ref 7–23)
CALCIUM SERPL-MCNC: 8.4 MG/DL — SIGNIFICANT CHANGE UP (ref 8.4–10.5)
CHLORIDE SERPL-SCNC: 100 MMOL/L — SIGNIFICANT CHANGE UP (ref 96–108)
CO2 SERPL-SCNC: 22 MMOL/L — SIGNIFICANT CHANGE UP (ref 22–31)
CREAT SERPL-MCNC: 1.98 MG/DL — HIGH (ref 0.5–1.3)
GLUCOSE BLDC GLUCOMTR-MCNC: 206 MG/DL — HIGH (ref 70–99)
GLUCOSE BLDC GLUCOMTR-MCNC: 287 MG/DL — HIGH (ref 70–99)
GLUCOSE BLDC GLUCOMTR-MCNC: 318 MG/DL — HIGH (ref 70–99)
GLUCOSE BLDC GLUCOMTR-MCNC: 322 MG/DL — HIGH (ref 70–99)
GLUCOSE SERPL-MCNC: 358 MG/DL — HIGH (ref 70–99)
HCT VFR BLD CALC: 29.5 % — LOW (ref 39–50)
HGB BLD-MCNC: 9.1 G/DL — LOW (ref 13–17)
MAGNESIUM SERPL-MCNC: 2.2 MG/DL — SIGNIFICANT CHANGE UP (ref 1.6–2.6)
MCHC RBC-ENTMCNC: 30.8 GM/DL — LOW (ref 32–36)
MCHC RBC-ENTMCNC: 32.9 PG — SIGNIFICANT CHANGE UP (ref 27–34)
MCV RBC AUTO: 106.5 FL — HIGH (ref 80–100)
PHOSPHATE SERPL-MCNC: 3.3 MG/DL — SIGNIFICANT CHANGE UP (ref 2.5–4.5)
PLATELET # BLD AUTO: 212 K/UL — SIGNIFICANT CHANGE UP (ref 150–400)
POTASSIUM SERPL-MCNC: 5.5 MMOL/L — HIGH (ref 3.5–5.3)
POTASSIUM SERPL-SCNC: 5.5 MMOL/L — HIGH (ref 3.5–5.3)
RBC # BLD: 2.77 M/UL — LOW (ref 4.2–5.8)
RBC # FLD: 18.2 % — HIGH (ref 10.3–14.5)
SODIUM SERPL-SCNC: 133 MMOL/L — LOW (ref 135–145)
WBC # BLD: 11.6 K/UL — HIGH (ref 3.8–10.5)
WBC # FLD AUTO: 11.6 K/UL — HIGH (ref 3.8–10.5)

## 2019-10-13 PROCEDURE — 93306 TTE W/DOPPLER COMPLETE: CPT | Mod: 26

## 2019-10-13 PROCEDURE — 99232 SBSQ HOSP IP/OBS MODERATE 35: CPT

## 2019-10-13 PROCEDURE — 93010 ELECTROCARDIOGRAM REPORT: CPT

## 2019-10-13 RX ORDER — FUROSEMIDE 40 MG
20 TABLET ORAL
Refills: 0 | Status: DISCONTINUED | OUTPATIENT
Start: 2019-10-13 | End: 2019-10-14

## 2019-10-13 RX ORDER — INSULIN LISPRO 100/ML
VIAL (ML) SUBCUTANEOUS AT BEDTIME
Refills: 0 | Status: DISCONTINUED | OUTPATIENT
Start: 2019-10-13 | End: 2019-10-22

## 2019-10-13 RX ADMIN — CLOPIDOGREL BISULFATE 75 MILLIGRAM(S): 75 TABLET, FILM COATED ORAL at 12:32

## 2019-10-13 RX ADMIN — Medication 4: at 17:41

## 2019-10-13 RX ADMIN — DULOXETINE HYDROCHLORIDE 30 MILLIGRAM(S): 30 CAPSULE, DELAYED RELEASE ORAL at 12:32

## 2019-10-13 RX ADMIN — Medication 2: at 22:20

## 2019-10-13 RX ADMIN — PANTOPRAZOLE SODIUM 40 MILLIGRAM(S): 20 TABLET, DELAYED RELEASE ORAL at 17:42

## 2019-10-13 RX ADMIN — DONEPEZIL HYDROCHLORIDE 10 MILLIGRAM(S): 10 TABLET, FILM COATED ORAL at 22:18

## 2019-10-13 RX ADMIN — INSULIN GLARGINE 14 UNIT(S): 100 INJECTION, SOLUTION SUBCUTANEOUS at 22:17

## 2019-10-13 RX ADMIN — Medication 2: at 10:04

## 2019-10-13 RX ADMIN — ATORVASTATIN CALCIUM 80 MILLIGRAM(S): 80 TABLET, FILM COATED ORAL at 22:18

## 2019-10-13 RX ADMIN — Medication 300 MILLIGRAM(S): at 22:18

## 2019-10-13 RX ADMIN — Medication 81 MILLIGRAM(S): at 12:32

## 2019-10-13 RX ADMIN — PANTOPRAZOLE SODIUM 40 MILLIGRAM(S): 20 TABLET, DELAYED RELEASE ORAL at 05:08

## 2019-10-13 RX ADMIN — Medication 1000 UNIT(S): at 12:32

## 2019-10-13 RX ADMIN — Medication 25 MILLIGRAM(S): at 17:41

## 2019-10-13 RX ADMIN — Medication 25 MILLIGRAM(S): at 05:08

## 2019-10-13 RX ADMIN — Medication 3: at 12:32

## 2019-10-13 RX ADMIN — Medication 20 MILLIGRAM(S): at 17:44

## 2019-10-13 NOTE — PHYSICAL THERAPY INITIAL EVALUATION ADULT - PLANNED THERAPY INTERVENTIONS, PT EVAL
bed mobility training/gait training/stair: GOAL: patient will be able to negotiated 6 stairs with min A and one HR in 2 weeks/transfer training

## 2019-10-13 NOTE — PHYSICAL THERAPY INITIAL EVALUATION ADULT - PERTINENT HX OF CURRENT PROBLEM, REHAB EVAL
89 yo M PMHx CAD s/p stents, CEA, PVD s/p stents, dementia, GI bleed presents sent in by Cardiologist for management of chest pain and EKG changes. CXR 10/11, linear scarring vs. atelectasis at left lung base, ECG 10/11, R BBB, T wave abnormality, possible inferior infarct

## 2019-10-13 NOTE — PROVIDER CONTACT NOTE (OTHER) - ACTION/TREATMENT ORDERED:
MD aware. No interventions at this time. Pt denies any medications for cough at this time. re-check temp at next VS check. Will continue to monitor. MD aware. No interventions at this time. Pt denies any medications for cough at this time. re-check temp at next VS check w/ rectal temp also as per MD Castellanos. Will continue to monitor.

## 2019-10-13 NOTE — CONSULT NOTE ADULT - ASSESSMENT
Impression    89 y/o M w/ hx of DM, HTN, CAD s/p PCI on DAPT, PVD, dementia, and recent hospitalization at [facility] for evaluation of hematochezia from 10/6/19 to 10/8/19, presenting with chest pain and found to have NSTEMI, with anemia and drop in Hgb requiring blood transfusion, however, with no overt bleeding.    # Anemia with drop in Hgb: Currently with no overt bleeding, HD stable, and with Hgb of 8.1 from 7.5 after receiving 1 unit pRBCs. Likely represents continued anemia following recent hematochezia. May represent occult blood loss from erosive gastropathy or angioectasias while on DAPT.   # CAD s/p PCI with new STEMI on DAPT  # Elevated NT pro-BNP: Possible new diagnosis of HF  # PVD  # DM  # HTN  # Dementia    Recommendations:  - No plan for endoscopic evaluation in setting of NSTEMI   - Can perform urgent endoscopy if patient develops significant bleeding affecting hemodynamics  - Monitor CBCs daily  - Monitor bowel movements  - Transfuse for goal Hgb >/= 8.0  - Can continue IV PPI BID  - Maintain active type and screen  - Rest of care per primary team    Josh Cortes MD  Gastroenterology Fellow  Pager number: [phone] / [phone]    Please call GI ([phone]) if there are any additional questions or concerns. Please call on-call GI fellow after 5pm and before 8am, and on weekends.

## 2019-10-13 NOTE — CONSULT NOTE ADULT - SUBJECTIVE AND OBJECTIVE BOX
Chief Complaint: Chest pain    HPI:    89 y/o M w/ hx of DM, HTN, CAD s/p PCI on DAPT, PVD, dementia, and recent hospitalization at [facility] for evaluation of hematochezia from 10/6/19 to 10/8/19, presenting with chest pain and found to have NSTEMI, found to be anemic with no overt GI bleeding, however, with drop in Hgb and requiring transfusion; GI consulted for evaluation    The patient denies bloody emesis, coffee ground emesis, bloody stools, and black tarry stools. He denies nausea/vomiting, abdominal pain, diarrhea, and constipation.    Allergies:  No Known Allergies    Home Medications:              ASA  · 	phenytoin 300 mg oral capsule, extended release: Last Dose Taken:  , 1 cap(s) orally once a day (at bedtime)  · 	clopidogrel 75 mg oral tablet: Last Dose Taken:  , 1 tab(s) orally once a day  · 	insulin glargine 100 units/mL subcutaneous solution: Last Dose Taken:  , 22 unit(s) subcutaneous once a day (at bedtime)  · 	insulin lispro 100 units/mL subcutaneous solution: Last Dose Taken:  , 10 unit(s) subcutaneous once a day (before a meal) before breakfast  · 	insulin lispro 100 units/mL subcutaneous solution: Last Dose Taken:  , 10 unit(s) subcutaneous once a day (before a meal) before dinner  · 	insulin lispro 100 units/mL subcutaneous solution: Last Dose Taken:  , 10 unit(s) subcutaneous once a day (before a meal)  · 	Aricept 10 mg oral tablet: Last Dose Taken:  , 1 tab(s) orally once a day (at bedtime)  · 	Cymbalta 30 mg oral delayed release capsule: Last Dose Taken:  , 1 cap(s) orally 2 times a day  · 	Vitamin D3 1000 intl units oral tablet: Last Dose Taken:  , 1 tab(s) orally once a day  · 	Crestor 10 mg oral tablet: Last Dose Taken:  , 1 tab(s) orally once a day    Hospital Medications:  aspirin enteric coated 81 milliGRAM(s) Oral daily  atorvastatin 80 milliGRAM(s) Oral at bedtime  cholecalciferol 1000 Unit(s) Oral daily  clopidogrel Tablet 75 milliGRAM(s) Oral daily  dextrose 40% Gel 15 Gram(s) Oral once PRN  dextrose 5%. 1000 milliLiter(s) IV Continuous <Continuous>  dextrose 50% Injectable 12.5 Gram(s) IV Push once  dextrose 50% Injectable 25 Gram(s) IV Push once  dextrose 50% Injectable 25 Gram(s) IV Push once  donepezil 10 milliGRAM(s) Oral at bedtime  DULoxetine 30 milliGRAM(s) Oral daily  furosemide    Tablet 20 milliGRAM(s) Oral two times a day  glucagon  Injectable 1 milliGRAM(s) IntraMuscular once PRN  insulin glargine Injectable (LANTUS) 14 Unit(s) SubCutaneous at bedtime  insulin lispro (HumaLOG) corrective regimen sliding scale   SubCutaneous three times a day before meals  metoprolol tartrate 25 milliGRAM(s) Oral two times a day  pantoprazole  Injectable 40 milliGRAM(s) IV Push two times a day  phenytoin   Capsule 300 milliGRAM(s) Oral at bedtime    PMHX/PSHX:  Seizure  OM (osteomyelitis)  PVD (peripheral vascular disease)  CAD (Coronary Artery Disease)  HTN (Hypertension)  HTN (Hypertension)  DM (Diabetes Mellitus)  S/P peripheral artery angioplasty with stent placement  History of total hip arthroplasty  S/P carotid endarterectomy    Family history:  No pertinent family history in first degree relatives    Denies family history of colon cancer/polyps, stomach cancer/polyps, pancreatic cancer/masses, liver cancer/disease, ovarian cancer and endometrial cancer.    Social History:     Tob: Denies  EtOH: Denies  Illicit Drugs: Denies    ROS:     General:  No wt loss, fevers, chills, night sweats, fatigue  Eyes:  Good vision, no reported pain  ENT:  No sore throat, pain, runny nose, dysphagia  CV:  No pain, palpitations, hypo/hypertension  Pulm:  No dyspnea, cough, tachypnea, wheezing  GI:  No pain, No nausea, No vomiting, No diarrhea, No constipation, No weight loss, No fever, No pruritis, No rectal bleeding, No tarry stools, No dysphagia,  :  No pain, bleeding, incontinence, nocturia  Muscle:  No pain, weakness  Neuro:  No weakness, tingling, memory problems  Psych:  No fatigue, insomnia, mood problems, depression  Endocrine:  No polyuria, polydipsia, cold/heat intolerance  Heme:  No petechiae, ecchymosis, easy bruisability  Skin:  No rash, tattoos, scars, edema    PHYSICAL EXAM:     GENERAL:  No acute distress  HEENT:  Normocephalic/atraumatic, no scleral icterus  CHEST:  Clear to auscultation bilaterally, no wheezes/rales/ronchi, no accessory muscle use  HEART:  Regular rate and rhythm, no murmurs/rubs/gallops  ABDOMEN:  Soft, non-tender, non-distended, normoactive bowel sounds,  no masses, no hepato-splenomegaly, no signs of chronic liver disease  EXTREMITIES: No cyanosis, clubbing, or edema  SKIN:  No rash/erythema/ecchymoses/petechiae/wounds/abscess/warm/dry  NEURO:  Alert and oriented x 3, no asterixis    Vital Signs:  Vital Signs Last 24 Hrs  T(C): 36.9 (13 Oct 2019 13:37), Max: 37.7 (12 Oct 2019 22:34)  T(F): 98.4 (13 Oct 2019 13:37), Max: 99.8 (12 Oct 2019 22:34)  HR: 76 (13 Oct 2019 13:37) (69 - 87)  BP: 113/61 (13 Oct 2019 13:37) (94/56 - 118/63)  BP(mean): --  RR: 18 (13 Oct 2019 13:37) (17 - 71)  SpO2: 97% (13 Oct 2019 13:37) (91% - 97%)  Daily Weight in k (13 Oct 2019 07:57)    LABS:                        8.1    7.75  )-----------( 178      ( 12 Oct 2019 11:03 )             26.1       10-12    139  |  106  |  28<H>  ----------------------------<  193<H>  4.8   |  25  |  1.35<H>    Ca    8.5      12 Oct 2019 05:55  Phos  3.6     10-12  Mg     1.9     10-12    TPro  6.2  /  Alb  3.3  /  TBili  0.2  /  DBili  x   /  AST  30  /  ALT  28  /  AlkPhos  120  10-11    LIVER FUNCTIONS - ( 11 Oct 2019 16:30 )  Alb: 3.3 g/dL / Pro: 6.2 g/dL / ALK PHOS: 120 U/L / ALT: 28 U/L / AST: 30 U/L / GGT: x           PT/INR - ( 11 Oct 2019 16:30 )   PT: 12.7 sec;   INR: 1.11 ratio         PTT - ( 11 Oct 2019 16:30 )  PTT:24.0 sec                            8.1    7.75  )-----------( 178      ( 12 Oct 2019 11:03 )             26.1                         7.5    8.90  )-----------( 202      ( 11 Oct 2019 16:30 )             23.3       Imaging: Chief Complaint: Chest pain    HPI:    89 y/o M w/ hx of DM, HTN, CAD s/p PCI on DAPT, PVD, dementia, and recent hospitalization at [facility] for evaluation of hematochezia from 10/6/19 to 10/8/19, presenting with chest pain and found to have NSTEMI, found to be anemic with no overt GI bleeding, however, with drop in Hgb and requiring transfusion; GI consulted for evaluation    The patient denies bloody emesis, coffee ground emesis, bloody stools, and black tarry stools, however, he states his HHA, per his family, noted a dark black stool yesterday. He denies nausea/vomiting, abdominal pain, diarrhea, and constipation. He no longer endorses chest pain.    Allergies:  No Known Allergies    Home Medications:              ASA  · 	phenytoin 300 mg oral capsule, extended release: Last Dose Taken:  , 1 cap(s) orally once a day (at bedtime)  · 	clopidogrel 75 mg oral tablet: Last Dose Taken:  , 1 tab(s) orally once a day  · 	insulin glargine 100 units/mL subcutaneous solution: Last Dose Taken:  , 22 unit(s) subcutaneous once a day (at bedtime)  · 	insulin lispro 100 units/mL subcutaneous solution: Last Dose Taken:  , 10 unit(s) subcutaneous once a day (before a meal) before breakfast  · 	insulin lispro 100 units/mL subcutaneous solution: Last Dose Taken:  , 10 unit(s) subcutaneous once a day (before a meal) before dinner  · 	insulin lispro 100 units/mL subcutaneous solution: Last Dose Taken:  , 10 unit(s) subcutaneous once a day (before a meal)  · 	Aricept 10 mg oral tablet: Last Dose Taken:  , 1 tab(s) orally once a day (at bedtime)  · 	Cymbalta 30 mg oral delayed release capsule: Last Dose Taken:  , 1 cap(s) orally 2 times a day  · 	Vitamin D3 1000 intl units oral tablet: Last Dose Taken:  , 1 tab(s) orally once a day  · 	Crestor 10 mg oral tablet: Last Dose Taken:  , 1 tab(s) orally once a day    Hospital Medications:  aspirin enteric coated 81 milliGRAM(s) Oral daily  atorvastatin 80 milliGRAM(s) Oral at bedtime  cholecalciferol 1000 Unit(s) Oral daily  clopidogrel Tablet 75 milliGRAM(s) Oral daily  dextrose 40% Gel 15 Gram(s) Oral once PRN  dextrose 5%. 1000 milliLiter(s) IV Continuous <Continuous>  dextrose 50% Injectable 12.5 Gram(s) IV Push once  dextrose 50% Injectable 25 Gram(s) IV Push once  dextrose 50% Injectable 25 Gram(s) IV Push once  donepezil 10 milliGRAM(s) Oral at bedtime  DULoxetine 30 milliGRAM(s) Oral daily  furosemide    Tablet 20 milliGRAM(s) Oral two times a day  glucagon  Injectable 1 milliGRAM(s) IntraMuscular once PRN  insulin glargine Injectable (LANTUS) 14 Unit(s) SubCutaneous at bedtime  insulin lispro (HumaLOG) corrective regimen sliding scale   SubCutaneous three times a day before meals  metoprolol tartrate 25 milliGRAM(s) Oral two times a day  pantoprazole  Injectable 40 milliGRAM(s) IV Push two times a day  phenytoin   Capsule 300 milliGRAM(s) Oral at bedtime    PMHX/PSHX:  Seizure  OM (osteomyelitis)  PVD (peripheral vascular disease)  CAD (Coronary Artery Disease)  HTN (Hypertension)  HTN (Hypertension)  DM (Diabetes Mellitus)  S/P peripheral artery angioplasty with stent placement  History of total hip arthroplasty  S/P carotid endarterectomy    Family history:  No pertinent family history in first degree relatives    Denies family history of colon cancer/polyps, stomach cancer/polyps, pancreatic cancer/masses, liver cancer/disease, ovarian cancer and endometrial cancer.    Social History:     Tob: Denies  EtOH: Denies  Illicit Drugs: Denies    ROS:     General:  No wt loss, fevers, chills, night sweats, fatigue  Eyes:  Good vision, no reported pain  ENT:  No sore throat, pain, runny nose, dysphagia  CV:  No pain, palpitations, hypo/hypertension  Pulm:  No dyspnea, cough, tachypnea, wheezing  GI:  No pain, No nausea, No vomiting, No diarrhea, No constipation, No weight loss, No fever, No pruritis, No rectal bleeding, No tarry stools, No dysphagia,  :  No pain, bleeding, incontinence, nocturia  Muscle:  No pain, weakness  Neuro:  No weakness, tingling, memory problems  Psych:  No fatigue, insomnia, mood problems, depression  Endocrine:  No polyuria, polydipsia, cold/heat intolerance  Heme:  No petechiae, ecchymosis, easy bruisability  Skin:  No rash, tattoos, scars, edema    PHYSICAL EXAM:     GENERAL:  No acute distress  HEENT:  Normocephalic/atraumatic, no scleral icterus  CHEST:  Clear to auscultation bilaterally, no wheezes/rales/ronchi, no accessory muscle use  HEART:  Regular rate and rhythm, no murmurs/rubs/gallops  ABDOMEN:  Soft, non-tender, non-distended, normoactive bowel sounds  RECTAL: Yellow-brown stool in rectal vault  EXTREMITIES: No cyanosis, clubbing, or edema  SKIN:  No rash/erythema/ecchymoses/petechiae/wounds/abscess/warm/dry  NEURO:  Alert and oriented x 3, no asterixis    Vital Signs:  Vital Signs Last 24 Hrs  T(C): 36.9 (13 Oct 2019 13:37), Max: 37.7 (12 Oct 2019 22:34)  T(F): 98.4 (13 Oct 2019 13:37), Max: 99.8 (12 Oct 2019 22:34)  HR: 76 (13 Oct 2019 13:37) (69 - 87)  BP: 113/61 (13 Oct 2019 13:37) (94/56 - 118/63)  BP(mean): --  RR: 18 (13 Oct 2019 13:37) (17 - 71)  SpO2: 97% (13 Oct 2019 13:37) (91% - 97%)  Daily Weight in k (13 Oct 2019 07:57)    LABS:                        8.1    7.75  )-----------( 178      ( 12 Oct 2019 11:03 )             26.1       10-    139  |  106  |  28<H>  ----------------------------<  193<H>  4.8   |  25  |  1.35<H>    Ca    8.5      12 Oct 2019 05:55  Phos  3.6     10-12  Mg     1.9     10-12    TPro  6.2  /  Alb  3.3  /  TBili  0.2  /  DBili  x   /  AST  30  /  ALT  28  /  AlkPhos  120  10-11    LIVER FUNCTIONS - ( 11 Oct 2019 16:30 )  Alb: 3.3 g/dL / Pro: 6.2 g/dL / ALK PHOS: 120 U/L / ALT: 28 U/L / AST: 30 U/L / GGT: x           PT/INR - ( 11 Oct 2019 16:30 )   PT: 12.7 sec;   INR: 1.11 ratio         PTT - ( 11 Oct 2019 16:30 )  PTT:24.0 sec                            8.1    7.75  )-----------( 178      ( 12 Oct 2019 11:03 )             26.1                         7.5    8.90  )-----------( 202      ( 11 Oct 2019 16:30 )             23.3       Imaging:

## 2019-10-13 NOTE — PROGRESS NOTE ADULT - ASSESSMENT
1. S/P NST elevation MI  2.?pleural effusion? clinically  3. hemodynamically stable  4. PVDstable  5. elevated BNP  6. anemia s/p Gi bleed    Recommend  check echo to be done today  OOB to chair  start lasix 20 PO BID  add ACE or arb pending echo renal fucntion and BP

## 2019-10-13 NOTE — PROGRESS NOTE ADULT - SUBJECTIVE AND OBJECTIVE BOX
Subjective: Patient seen and examined. No new events except as noted.   c/o cough nonproductive  no cp denies sob lying flat in bed on nasal O2  MEDICATIONS:  MEDICATIONS  (STANDING):  aspirin enteric coated 81 milliGRAM(s) Oral daily  atorvastatin 80 milliGRAM(s) Oral at bedtime  cholecalciferol 1000 Unit(s) Oral daily  clopidogrel Tablet 75 milliGRAM(s) Oral daily  dextrose 5%. 1000 milliLiter(s) (50 mL/Hr) IV Continuous <Continuous>  dextrose 50% Injectable 12.5 Gram(s) IV Push once  dextrose 50% Injectable 25 Gram(s) IV Push once  dextrose 50% Injectable 25 Gram(s) IV Push once  donepezil 10 milliGRAM(s) Oral at bedtime  DULoxetine 30 milliGRAM(s) Oral daily  insulin glargine Injectable (LANTUS) 14 Unit(s) SubCutaneous at bedtime  insulin lispro (HumaLOG) corrective regimen sliding scale   SubCutaneous three times a day before meals  metoprolol tartrate 25 milliGRAM(s) Oral two times a day  pantoprazole  Injectable 40 milliGRAM(s) IV Push two times a day  phenytoin   Capsule 300 milliGRAM(s) Oral at bedtime      PHYSICAL EXAM:  T(C): 36.8 (10-13-19 @ 04:48), Max: 37.7 (10-12-19 @ 22:34)  HR: 80 (10-13-19 @ 05:07) (69 - 87)  BP: 107/55 (10-13-19 @ 05:07) (94/56 - 118/63)  RR: 18 (10-13-19 @ 04:48) (17 - 71)  SpO2: 93% (10-13-19 @ 04:48) (91% - 93%)  Wt(kg): --  I&O's Summary    12 Oct 2019 07:01  -  13 Oct 2019 07:00  --------------------------------------------------------  IN: 900 mL / OUT: 400 mL / NET: 500 mL    13 Oct 2019 07:01  -  13 Oct 2019 09:09  --------------------------------------------------------  IN: 0 mL / OUT: 250 mL / NET: -250 mL          Appearance: Normal frequent PACs	  HEENT:   Normal oral mucosa, PERRL, EOMI	  Cardiovascular: Normal S1 S2, No JVD, No murmurs ,  Respiratory: Lungs decreased BS at bases egophaony	  Gastrointestinal:  Soft, Non-tender, + BS	  Skin: No rashes, No ecchymoses, No cyanosis, warm to touch  Musculoskeletal: Normal range of motion, normal strength  Psychiatry:  Mood & affect appropriate  Ext: No edema  Peripheral pulses palpable 2+ bilaterally      LABS:    CARDIAC MARKERS:  CARDIAC MARKERS ( 11 Oct 2019 16:30 )  x     / x     / x     / x     / 5.2 ng/mL                                8.1    7.75  )-----------( 178      ( 12 Oct 2019 11:03 )             26.1     10-12    139  |  106  |  28<H>  ----------------------------<  193<H>  4.8   |  25  |  1.35<H>    Ca    8.5      12 Oct 2019 05:55  Phos  3.6     10-12  Mg     1.9     10-12    TPro  6.2  /  Alb  3.3  /  TBili  0.2  /  DBili  x   /  AST  30  /  ALT  28  /  AlkPhos  120  10-11    proBNP:   Lipid Profile:   HgA1c:   TSH:           TELEMETRY: 	    ECG: NSR frequent PACs	  RADIOLOGY:   DIAGNOSTIC TESTING:  [ ] Echocardiogram:  [ ]  Catheterization:  [ ] Stress Test:    OTHER:

## 2019-10-13 NOTE — PHYSICAL THERAPY INITIAL EVALUATION ADULT - ADDITIONAL COMMENTS
as per pt, lives with wife in private house 6 steps to enter, first floor setup, walk in shower with chair, HHA 10h/7d, to assist with ADLs. pt amb short distances within home with rolling walker; son states 2 people assist with negotiating front steps as baseline. pt owns wheelchair and transport chair

## 2019-10-13 NOTE — PROGRESS NOTE ADULT - ASSESSMENT
89 yo M PMHx CAD s/p stents, CEA, PVD s/p stents, dementia, GI bleed presents sent in by Cardiologist for management of NSTEMI. Patient clinically stable, being followed by outpatient Cardiologist Dr. Holman.

## 2019-10-13 NOTE — PROGRESS NOTE ADULT - PROBLEM SELECTOR PLAN 5
- Home dependent Lantus 22U QHS, 10U premeal  - Most recent admission was Lantus 14U QHS, and 6U premeal  - Continue with lantus 14U QHS monitor off premeal insulin (can be restarted if persistently hyperglycemic)  - ISS

## 2019-10-13 NOTE — PROGRESS NOTE ADULT - SUBJECTIVE AND OBJECTIVE BOX
Contact info:  Shonna Contreras MD  Internal Medicine, PGY3  Pager: [phone] (NS)/[phone] (VIVIANE)    M-F 7AM-7PM: pager covered by primary day team  Mon-Sun 7PM-7AM: Night float page [phone] for teams 1-3, [phone] for teams 4, CMA, CMB  Sa-Sun 7AM-12PM: please see contact sheet in front of chart and/or Provider to RN, primary day team  Sa-Sun 12PM-7PM: Page [phone] for teams 1-4 (NS), primary team for CMA/CMB, VIVIANE please page covering resident    24 HOUR EVENTS/ROS:    MEDICATIONS:  aspirin enteric coated 81 milliGRAM(s) Oral daily  clopidogrel Tablet 75 milliGRAM(s) Oral daily  metoprolol tartrate 25 milliGRAM(s) Oral two times a day        donepezil 10 milliGRAM(s) Oral at bedtime  DULoxetine 30 milliGRAM(s) Oral daily  phenytoin   Capsule 300 milliGRAM(s) Oral at bedtime    pantoprazole  Injectable 40 milliGRAM(s) IV Push two times a day    atorvastatin 80 milliGRAM(s) Oral at bedtime  dextrose 40% Gel 15 Gram(s) Oral once PRN  dextrose 50% Injectable 12.5 Gram(s) IV Push once  dextrose 50% Injectable 25 Gram(s) IV Push once  dextrose 50% Injectable 25 Gram(s) IV Push once  glucagon  Injectable 1 milliGRAM(s) IntraMuscular once PRN  insulin glargine Injectable (LANTUS) 14 Unit(s) SubCutaneous at bedtime  insulin lispro (HumaLOG) corrective regimen sliding scale   SubCutaneous three times a day before meals    cholecalciferol 1000 Unit(s) Oral daily  dextrose 5%. 1000 milliLiter(s) IV Continuous <Continuous>      PHYSICAL EXAM:  T(C): 36.8 (10-13-19 @ 04:48), Max: 37.7 (10-12-19 @ 22:34)  HR: 80 (10-13-19 @ 05:07) (69 - 87)  BP: 107/55 (10-13-19 @ 05:07) (94/56 - 118/63)  RR: 18 (10-13-19 @ 04:48) (17 - 71)  SpO2: 93% (10-13-19 @ 04:48) (91% - 93%)  Wt(kg): --  Daily     Daily Weight in k.3 (12 Oct 2019 08:19)  I&O's Summary    12 Oct 2019 07:01  -  13 Oct 2019 07:00  --------------------------------------------------------  IN: 900 mL / OUT: 400 mL / NET: 500 mL      TELEMETRY:     Appearance: NAD	  HEENT:   Normal oral mucosa, PERRL, EOMI	  Lymphatic: No lymphadenopathy  Cardiovascular: Normal S1 S2, No JVD, No murmurs, No edema  Respiratory: Lungs clear to auscultation	  Neuro/psych: Grossly non-focal, CN 2-12 intact, AAOX3, mood and affect normal  Gastrointestinal:  Soft, Non-tender, + BS	  Skin: No rashes, No ecchymoses, No cyanosis	  Extremities: Normal range of motion, No clubbing, cyanosis or edema  Vascular: Peripheral pulses palpable 2+ bilaterally    LABS:	 	                        8.1    7.75  )-----------( 178      ( 12 Oct 2019 11:03 )             26.1     10-12    139  |  106  |  28<H>  ----------------------------<  193<H>  4.8   |  25  |  1.35<H>  10-11    135  |  105  |  32<H>  ----------------------------<  256<H>  5.2   |  20<L>  |  1.39<H>    Ca    8.5      12 Oct 2019 05:55  Ca    8.0<L>      11 Oct 2019 16:30  Phos  3.6     10-12  Mg     1.9     10-12  Mg     1.9     10-11    TPro  6.2  /  Alb  3.3  /  TBili  0.2  /  DBili  x   /  AST  30  /  ALT  28  /  AlkPhos  120  10-11    proBNP: Serum Pro-Brain Natriuretic Peptide: 22715 pg/mL (10-12 @ 05:55)    Lipid Profile:   HgA1c:   TSH:   FS: CAPILLARY BLOOD GLUCOSE      POCT Blood Glucose.: 281 mg/dL (12 Oct 2019 21:18)  POCT Blood Glucose.: 204 mg/dL (12 Oct 2019 17:29)  POCT Blood Glucose.: 324 mg/dL (12 Oct 2019 12:12)  POCT Blood Glucose.: 184 mg/dL (12 Oct 2019 08:50)    BCX/UCX:   Coags: PT/INR - ( 11 Oct 2019 16:30 )   PT: 12.7 sec;   INR: 1.11 ratio         PTT - ( 11 Oct 2019 16:30 )  PTT:24.0 sec    CARDIAC MARKERS:            	    ECG:  	  RADIOLOGY:    Consult notes reviewed:  Care discussed with providers: Contact info:  Shonna Contreras MD  Internal Medicine, PGY3  Pager: [phone] (NS)/[phone] (LILENO)    M-F 7AM-7PM: pager covered by primary day team  Mon-Sun 7PM-7AM: Night float page [phone] for teams 1-3, [phone] for teams 4, CMA, CMB  Sa-Sun 7AM-12PM: please see contact sheet in front of chart and/or Provider to RN, primary day team  Sa-Sun 12PM-7PM: Page [phone] for teams 1-4 (NS), primary team for CMA/CMB, LILENO please page covering resident    24 HOUR EVENTS/ROS: No acute events overnight. Denies headaches, F/C, dizziness, syncope, CP/SOB, N/V, abdominal pain, dysuria, melena, swelling. Reports feeling constipated.    MEDICATIONS:  aspirin enteric coated 81 milliGRAM(s) Oral daily  clopidogrel Tablet 75 milliGRAM(s) Oral daily  metoprolol tartrate 25 milliGRAM(s) Oral two times a day  donepezil 10 milliGRAM(s) Oral at bedtime  DULoxetine 30 milliGRAM(s) Oral daily  phenytoin   Capsule 300 milliGRAM(s) Oral at bedtime  pantoprazole  Injectable 40 milliGRAM(s) IV Push two times a day  atorvastatin 80 milliGRAM(s) Oral at bedtime  dextrose 40% Gel 15 Gram(s) Oral once PRN  dextrose 50% Injectable 12.5 Gram(s) IV Push once  dextrose 50% Injectable 25 Gram(s) IV Push once  dextrose 50% Injectable 25 Gram(s) IV Push once  glucagon  Injectable 1 milliGRAM(s) IntraMuscular once PRN  insulin glargine Injectable (LANTUS) 14 Unit(s) SubCutaneous at bedtime  insulin lispro (HumaLOG) corrective regimen sliding scale   SubCutaneous three times a day before meals  cholecalciferol 1000 Unit(s) Oral daily  dextrose 5%. 1000 milliLiter(s) IV Continuous <Continuous>      PHYSICAL EXAM:  T(C): 36.8 (10-13-19 @ 04:48), Max: 37.7 (10-12-19 @ 22:34)  HR: 80 (10-13-19 @ 05:07) (69 - 87)  BP: 107/55 (10-13-19 @ 05:07) (94/56 - 118/63)  RR: 18 (10-13-19 @ 04:48) (17 - 71)  SpO2: 93% (10-13-19 @ 04:48) (91% - 93%)  Wt(kg): --  Daily     Daily Weight in k.3 (12 Oct 2019 08:19)  I&O's Summary    12 Oct 2019 07:01  -  13 Oct 2019 07:00  --------------------------------------------------------  IN: 900 mL / OUT: 400 mL / NET: 500 mL      TELEMETRY: NSR with frequent ectopy: PACS, PVCs    Constitutional: NAD, awake and alert  EYES: +PERRL, +EOMI, no scleral icterus  ENT: Moist oral mucosa  Neck: Supple  Respiratory: CTAB. No wheezing, rales or rhonchi  Cardiovascular: S1/S2, RRR. No murmurs, gallops or rubs, no JVD,    Gastrointestinal: Soft, nontender, nondistended, +BS  Extremities: No EXT edema  Vascular: Pulses x 4EXT  Neurological: No focal deficits. A&Ox3  Musculoskeletal: 5/5 strength b/l upper and lower extremities; no joint swelling.  Skin: No rashes      LABS:	 	                        8.1    7.75  )-----------( 178      ( 12 Oct 2019 11:03 )             26.1     10    139  |  106  |  28<H>  ----------------------------<  193<H>  4.8   |  25  |  1.35<H>  10    135  |  105  |  32<H>  ----------------------------<  256<H>  5.2   |  20<L>  |  1.39<H>    Ca    8.5      12 Oct 2019 05:55  Ca    8.0<L>      11 Oct 2019 16:30  Phos  3.6     10-12  Mg     1.9     10-12  Mg     1.9     10-11    TPro  6.2  /  Alb  3.3  /  TBili  0.2  /  DBili  x   /  AST  30  /  ALT  28  /  AlkPhos  120  10-11    proBNP: Serum Pro-Brain Natriuretic Peptide: 03662 pg/mL (10-12 @ 05:55)    POCT Blood Glucose.: 281 mg/dL (12 Oct 2019 21:18)  POCT Blood Glucose.: 204 mg/dL (12 Oct 2019 17:29)  POCT Blood Glucose.: 324 mg/dL (12 Oct 2019 12:12)  POCT Blood Glucose.: 184 mg/dL (12 Oct 2019 08:50)    BCX/UCX:   Coags: PT/INR - ( 11 Oct 2019 16:30 )   PT: 12.7 sec;   INR: 1.11 ratio         PTT - ( 11 Oct 2019 16:30 )  PTT:24.0 sec    	    ECG:  	10/11 EKG:  Diagnosis Line NORMAL SINUS RHYTHM  RIGHT BUNDLE BRANCH BLOCK  POSSIBLE INFERIOR INFARCT , AGE UNDETERMINED  T WAVE ABNORMALITY, CONSIDER LATERAL ISCHEMIA  ABNORMAL ECG    RADIOLOGY:    Consult notes reviewed:  cards  Care discussed with providers:

## 2019-10-13 NOTE — PROGRESS NOTE ADULT - PROBLEM SELECTOR PLAN 2
- Recently admitted for workup of melena concerning for UGIB; ASA held, plavix continued. Hgb stable so no intervention. Reports dark stools  - Received s/p 1U PRBC in ED  - Post transfusion CBC  - F/u FOBT, GI consult pending  - CBC Q12 for now, VSQ4hr  - Transfuse prn Hgb<8  - Protonix IV 40mg BID

## 2019-10-13 NOTE — PROGRESS NOTE ADULT - PROBLEM SELECTOR PLAN 1
- JACQUELINE in anterior leads, STD in precordial leads, diffuse on repeat EKG  - Troponin 1163 --> ST Depression MI  - Appreciate cardiology and CCU recommendations.   - Given recent history of GIB will management conservatively without heparin gtt  - ASA 81 daily  - Plavix 75 mg daily   - Metoprolol tartrate 25mg po BID  - f/u TTE - pending results will restart ACE/ARB  - Primary Cardiologist Dr. Holman following

## 2019-10-13 NOTE — PHYSICAL THERAPY INITIAL EVALUATION ADULT - DISCHARGE DISPOSITION, PT EVAL
home w/ assist/Home with Home PT for strengthening, bed mob, transfer, gait and balance training, home with assist for all functional mobility/home w/ home PT/home

## 2019-10-13 NOTE — PROGRESS NOTE ADULT - PROBLEM SELECTOR PLAN 3
- CAD s/p stents, remote hx of MI  - ASA held last admission 2/2 GIB  - Presents with NSTEMI  - c/w ASA, plavix  - Atorvastatin 80mg QHS  - Metoprolol tartrate 25mg PO BID  - ACE/ARB pending TTE results

## 2019-10-13 NOTE — PROVIDER CONTACT NOTE (OTHER) - RECOMMENDATIONS
MD aware. No interventions at this time. Pt denies any medications for cough at this time. re-check temp at next VS check. Will continue to monitor. MD aware. No interventions at this time. Pt denies any medications for cough at this time. re-check temp at next VS check w/ rectal temp also as per MD Castellanos. Will continue to monitor. No

## 2019-10-14 DIAGNOSIS — N17.9 ACUTE KIDNEY FAILURE, UNSPECIFIED: ICD-10-CM

## 2019-10-14 DIAGNOSIS — R65.10 SYSTEMIC INFLAMMATORY RESPONSE SYNDROME (SIRS) OF NON-INFECTIOUS ORIGIN WITHOUT ACUTE ORGAN DYSFUNCTION: ICD-10-CM

## 2019-10-14 LAB
ALBUMIN SERPL ELPH-MCNC: 3 G/DL — LOW (ref 3.3–5)
ALP SERPL-CCNC: 119 U/L — SIGNIFICANT CHANGE UP (ref 40–120)
ALT FLD-CCNC: 30 U/L — SIGNIFICANT CHANGE UP (ref 10–45)
ANION GAP SERPL CALC-SCNC: 7 MMOL/L — SIGNIFICANT CHANGE UP (ref 5–17)
AST SERPL-CCNC: 26 U/L — SIGNIFICANT CHANGE UP (ref 10–40)
B PERT DNA SPEC QL NAA+PROBE: SIGNIFICANT CHANGE UP
BILIRUB SERPL-MCNC: 0.2 MG/DL — SIGNIFICANT CHANGE UP (ref 0.2–1.2)
BUN SERPL-MCNC: 36 MG/DL — HIGH (ref 7–23)
C PNEUM DNA SPEC QL NAA+PROBE: SIGNIFICANT CHANGE UP
CALCIUM SERPL-MCNC: 8.2 MG/DL — LOW (ref 8.4–10.5)
CHLORIDE SERPL-SCNC: 104 MMOL/L — SIGNIFICANT CHANGE UP (ref 96–108)
CO2 SERPL-SCNC: 26 MMOL/L — SIGNIFICANT CHANGE UP (ref 22–31)
CREAT SERPL-MCNC: 1.94 MG/DL — HIGH (ref 0.5–1.3)
FLUAV H1 2009 PAND RNA SPEC QL NAA+PROBE: SIGNIFICANT CHANGE UP
FLUAV H1 RNA SPEC QL NAA+PROBE: SIGNIFICANT CHANGE UP
FLUAV H3 RNA SPEC QL NAA+PROBE: SIGNIFICANT CHANGE UP
FLUAV SUBTYP SPEC NAA+PROBE: SIGNIFICANT CHANGE UP
FLUBV RNA SPEC QL NAA+PROBE: SIGNIFICANT CHANGE UP
FOLATE SERPL-MCNC: >20 NG/ML — SIGNIFICANT CHANGE UP
GLUCOSE BLDC GLUCOMTR-MCNC: 152 MG/DL — HIGH (ref 70–99)
GLUCOSE BLDC GLUCOMTR-MCNC: 281 MG/DL — HIGH (ref 70–99)
GLUCOSE BLDC GLUCOMTR-MCNC: 340 MG/DL — HIGH (ref 70–99)
GLUCOSE BLDC GLUCOMTR-MCNC: 361 MG/DL — HIGH (ref 70–99)
GLUCOSE SERPL-MCNC: 155 MG/DL — HIGH (ref 70–99)
HADV DNA SPEC QL NAA+PROBE: SIGNIFICANT CHANGE UP
HCOV PNL SPEC NAA+PROBE: SIGNIFICANT CHANGE UP
HCT VFR BLD CALC: 26.3 % — LOW (ref 39–50)
HGB BLD-MCNC: 8.4 G/DL — LOW (ref 13–17)
HMPV RNA SPEC QL NAA+PROBE: SIGNIFICANT CHANGE UP
HPIV1 RNA SPEC QL NAA+PROBE: SIGNIFICANT CHANGE UP
HPIV2 RNA SPEC QL NAA+PROBE: SIGNIFICANT CHANGE UP
HPIV3 RNA SPEC QL NAA+PROBE: SIGNIFICANT CHANGE UP
HPIV4 RNA SPEC QL NAA+PROBE: SIGNIFICANT CHANGE UP
IRON SATN MFR SERPL: 10 % — LOW (ref 16–55)
IRON SATN MFR SERPL: 22 UG/DL — LOW (ref 45–165)
MAGNESIUM SERPL-MCNC: 2.2 MG/DL — SIGNIFICANT CHANGE UP (ref 1.6–2.6)
MCHC RBC-ENTMCNC: 31.9 GM/DL — LOW (ref 32–36)
MCHC RBC-ENTMCNC: 33.1 PG — SIGNIFICANT CHANGE UP (ref 27–34)
MCV RBC AUTO: 103.5 FL — HIGH (ref 80–100)
NRBC # BLD: 0 /100 WBCS — SIGNIFICANT CHANGE UP (ref 0–0)
PHOSPHATE SERPL-MCNC: 3.9 MG/DL — SIGNIFICANT CHANGE UP (ref 2.5–4.5)
PLATELET # BLD AUTO: 190 K/UL — SIGNIFICANT CHANGE UP (ref 150–400)
POTASSIUM SERPL-MCNC: 4.7 MMOL/L — SIGNIFICANT CHANGE UP (ref 3.5–5.3)
POTASSIUM SERPL-SCNC: 4.7 MMOL/L — SIGNIFICANT CHANGE UP (ref 3.5–5.3)
PROT SERPL-MCNC: 5.9 G/DL — LOW (ref 6–8.3)
RAPID RVP RESULT: SIGNIFICANT CHANGE UP
RBC # BLD: 2.54 M/UL — LOW (ref 4.2–5.8)
RBC # FLD: 17.8 % — HIGH (ref 10.3–14.5)
RSV RNA SPEC QL NAA+PROBE: SIGNIFICANT CHANGE UP
RV+EV RNA SPEC QL NAA+PROBE: SIGNIFICANT CHANGE UP
SODIUM SERPL-SCNC: 137 MMOL/L — SIGNIFICANT CHANGE UP (ref 135–145)
TIBC SERPL-MCNC: 220 UG/DL — SIGNIFICANT CHANGE UP (ref 220–430)
TROPONIN T, HIGH SENSITIVITY RESULT: 1262 NG/L — HIGH (ref 0–51)
UIBC SERPL-MCNC: 198 UG/DL — SIGNIFICANT CHANGE UP (ref 110–370)
VIT B12 SERPL-MCNC: 651 PG/ML — SIGNIFICANT CHANGE UP (ref 232–1245)
WBC # BLD: 11.37 K/UL — HIGH (ref 3.8–10.5)
WBC # FLD AUTO: 11.37 K/UL — HIGH (ref 3.8–10.5)

## 2019-10-14 PROCEDURE — 99233 SBSQ HOSP IP/OBS HIGH 50: CPT | Mod: GC

## 2019-10-14 PROCEDURE — 71045 X-RAY EXAM CHEST 1 VIEW: CPT | Mod: 26

## 2019-10-14 RX ORDER — AZITHROMYCIN 500 MG/1
TABLET, FILM COATED ORAL
Refills: 0 | Status: DISCONTINUED | OUTPATIENT
Start: 2019-10-14 | End: 2019-10-14

## 2019-10-14 RX ORDER — AZITHROMYCIN 500 MG/1
500 TABLET, FILM COATED ORAL EVERY 24 HOURS
Refills: 0 | Status: DISCONTINUED | OUTPATIENT
Start: 2019-10-15 | End: 2019-10-15

## 2019-10-14 RX ORDER — ACETAMINOPHEN 500 MG
650 TABLET ORAL ONCE
Refills: 0 | Status: COMPLETED | OUTPATIENT
Start: 2019-10-14 | End: 2019-10-14

## 2019-10-14 RX ORDER — INSULIN LISPRO 100/ML
3 VIAL (ML) SUBCUTANEOUS
Refills: 0 | Status: DISCONTINUED | OUTPATIENT
Start: 2019-10-14 | End: 2019-10-15

## 2019-10-14 RX ORDER — AZITHROMYCIN 500 MG/1
TABLET, FILM COATED ORAL
Refills: 0 | Status: DISCONTINUED | OUTPATIENT
Start: 2019-10-14 | End: 2019-10-15

## 2019-10-14 RX ORDER — CEFTRIAXONE 500 MG/1
1000 INJECTION, POWDER, FOR SOLUTION INTRAMUSCULAR; INTRAVENOUS EVERY 24 HOURS
Refills: 0 | Status: DISCONTINUED | OUTPATIENT
Start: 2019-10-14 | End: 2019-10-15

## 2019-10-14 RX ORDER — AZITHROMYCIN 500 MG/1
500 TABLET, FILM COATED ORAL ONCE
Refills: 0 | Status: COMPLETED | OUTPATIENT
Start: 2019-10-14 | End: 2019-10-14

## 2019-10-14 RX ADMIN — INSULIN GLARGINE 14 UNIT(S): 100 INJECTION, SOLUTION SUBCUTANEOUS at 22:30

## 2019-10-14 RX ADMIN — Medication 650 MILLIGRAM(S): at 01:30

## 2019-10-14 RX ADMIN — Medication 300 MILLIGRAM(S): at 21:57

## 2019-10-14 RX ADMIN — Medication 1000 UNIT(S): at 12:29

## 2019-10-14 RX ADMIN — AZITHROMYCIN 250 MILLIGRAM(S): 500 TABLET, FILM COATED ORAL at 18:04

## 2019-10-14 RX ADMIN — Medication 5: at 12:29

## 2019-10-14 RX ADMIN — Medication 3 UNIT(S): at 17:36

## 2019-10-14 RX ADMIN — Medication 20 MILLIGRAM(S): at 05:23

## 2019-10-14 RX ADMIN — Medication 4: at 17:36

## 2019-10-14 RX ADMIN — Medication 25 MILLIGRAM(S): at 05:23

## 2019-10-14 RX ADMIN — Medication 650 MILLIGRAM(S): at 00:12

## 2019-10-14 RX ADMIN — PANTOPRAZOLE SODIUM 40 MILLIGRAM(S): 20 TABLET, DELAYED RELEASE ORAL at 17:36

## 2019-10-14 RX ADMIN — CEFTRIAXONE 100 MILLIGRAM(S): 500 INJECTION, POWDER, FOR SOLUTION INTRAMUSCULAR; INTRAVENOUS at 18:04

## 2019-10-14 RX ADMIN — PANTOPRAZOLE SODIUM 40 MILLIGRAM(S): 20 TABLET, DELAYED RELEASE ORAL at 05:23

## 2019-10-14 RX ADMIN — Medication 81 MILLIGRAM(S): at 12:29

## 2019-10-14 RX ADMIN — Medication 1: at 22:29

## 2019-10-14 RX ADMIN — ATORVASTATIN CALCIUM 80 MILLIGRAM(S): 80 TABLET, FILM COATED ORAL at 21:57

## 2019-10-14 RX ADMIN — Medication 25 MILLIGRAM(S): at 17:36

## 2019-10-14 RX ADMIN — DONEPEZIL HYDROCHLORIDE 10 MILLIGRAM(S): 10 TABLET, FILM COATED ORAL at 21:57

## 2019-10-14 RX ADMIN — Medication 1: at 08:30

## 2019-10-14 RX ADMIN — CLOPIDOGREL BISULFATE 75 MILLIGRAM(S): 75 TABLET, FILM COATED ORAL at 12:29

## 2019-10-14 NOTE — PROGRESS NOTE ADULT - PROBLEM SELECTOR PLAN 7
c/w ASA, Plavix - consider DC'ing (will discuss with Cardiology if H&H drops) - c/w ASA, Plavix  - If Hgb continues to drop, consider holding - Home dependent Lantus 22U QHS, 10U premeal  - Most recent admission was Lantus 14U QHS, and 6U premeal  - Continue with Lantus 14U QHS  - ISS

## 2019-10-14 NOTE — PROGRESS NOTE ADULT - PROBLEM SELECTOR PLAN 3
- Recently admitted for workup of melena concerning for UGIB; ASA held, plavix continued. Hgb stable so no intervention. Reports dark stools  - Received s/p 1U PRBC in ED  - GI consulted - no plan for endoscopy  - CBC Q12 for now, VSQ4hr  - Transfuse prn Hgb<8  - Protonix IV 40mg BID - JACQUELINE in anterior leads, STD in precordial leads, diffuse on repeat EKG  - Troponin 1163 --> ST Depression MI  - Appreciate cardiology and CCU recommendations.   - Given recent history of GIB will management conservatively without heparin gtt  - ASA 81 daily  - Plavix 75 mg daily   - Metoprolol tartrate 25mg po BID  - TTE 2D - Moderate segmental LV systolic dysfunction  - Primary Cardiologist Dr. Holman following

## 2019-10-14 NOTE — PROGRESS NOTE ADULT - SUBJECTIVE AND OBJECTIVE BOX
Subjective: Patient seen and examined. No new events except as noted.   Offers no complaints of cp or sob only complaint is cough  creatinine increased to 1.9  echo moderate segmental LV dysfunction  BP   on antiboiotics  MEDICATIONS:  MEDICATIONS  (STANDING):  aspirin enteric coated 81 milliGRAM(s) Oral daily  atorvastatin 80 milliGRAM(s) Oral at bedtime  azithromycin  IVPB 500 milliGRAM(s) IV Intermittent once  azithromycin  IVPB      cefTRIAXone   IVPB 1000 milliGRAM(s) IV Intermittent every 24 hours  cholecalciferol 1000 Unit(s) Oral daily  clopidogrel Tablet 75 milliGRAM(s) Oral daily  dextrose 5%. 1000 milliLiter(s) (50 mL/Hr) IV Continuous <Continuous>  dextrose 50% Injectable 12.5 Gram(s) IV Push once  dextrose 50% Injectable 25 Gram(s) IV Push once  dextrose 50% Injectable 25 Gram(s) IV Push once  donepezil 10 milliGRAM(s) Oral at bedtime  insulin glargine Injectable (LANTUS) 14 Unit(s) SubCutaneous at bedtime  insulin lispro (HumaLOG) corrective regimen sliding scale   SubCutaneous at bedtime  insulin lispro (HumaLOG) corrective regimen sliding scale   SubCutaneous three times a day before meals  insulin lispro Injectable (HumaLOG) 3 Unit(s) SubCutaneous three times a day before meals  metoprolol tartrate 25 milliGRAM(s) Oral two times a day  pantoprazole  Injectable 40 milliGRAM(s) IV Push two times a day  phenytoin   Capsule 300 milliGRAM(s) Oral at bedtime      PHYSICAL EXAM:  T(C): 37 (10-14-19 @ 13:44), Max: 38.8 (10-13-19 @ 23:39)  HR: 81 (10-14-19 @ 13:44) (76 - 82)  BP: 106/58 (10-14-19 @ 13:44) (98/59 - 127/61)  RR: 18 (10-14-19 @ 13:44) (17 - 19)  SpO2: 88% (10-14-19 @ 13:44) (88% - 97%)  Wt(kg): --  I&O's Summary    13 Oct 2019 07:01  -  14 Oct 2019 07:00  --------------------------------------------------------  IN: 720 mL / OUT: 800 mL / NET: -80 mL    14 Oct 2019 07:01  -  14 Oct 2019 16:26  --------------------------------------------------------  IN: 940 mL / OUT: 375 mL / NET: 565 mL          Appearance: Normal	  HEENT:   Normal oral mucosa, PERRL, EOMI	  Cardiovascular: Normal S1 S2, No JVD, No murmurs ,  Respiratory: Lungs decreased BS right base o rales rhonchi or wheezes	  Gastrointestinal:  Soft, Non-tender, + BS	  Skin: No rashes, No ecchymoses, No cyanosis, warm to touch  Musculoskeletal: Normal range of motion, normal strength  Psychiatry:  Mood & affect appropriate  Ext: No edema        LABS:    CARDIAC MARKERS:  CARDIAC MARKERS ( 11 Oct 2019 16:30 )  x     / x     / x     / x     / 5.2 ng/mL                                8.4    11.37 )-----------( 190      ( 14 Oct 2019 06:06 )             26.3     10-14    137  |  104  |  36<H>  ----------------------------<  155<H>  4.7   |  26  |  1.94<H>    Ca    8.2<L>      14 Oct 2019 06:06  Phos  3.9     10-14  Mg     2.2     10-14    TPro  5.9<L>  /  Alb  3.0<L>  /  TBili  0.2  /  DBili  x   /  AST  26  /  ALT  30  /  AlkPhos  119  10-14    proBNP:   Lipid Profile:   HgA1c:   TSH:           TELEMETRY: 	    ECG:  	  RADIOLOGY:   DIAGNOSTIC TESTING:  [ ] Echocardiogram:  < from: TTE with Doppler (w/Cont) (10.13.19 @ 10:40) >  Conclusions:  1. Mitral annular calcification, otherwise normal mitral  valve.  2. Calcified aortic valve with probably normal opening.  3. Moderately dilated left atrium.  LA volume index = 46  cc/m2.  4. Moderate segmental left ventricular systolic  dysfunction. The basal to mid lateral and basal to mid  anterior walls are best preserved, all other walls are  hypokinetic.  Endocardial visualization enhanced with  intravenous injection of Ultrasonic Enhancing Agent  (Definity).  5. The right ventricle is not well visualized grossly  enlarged.  ------------------------------------------------------------------------  Confirmed on  10/13/2019 - 16:01:40 by Anthony Cormier M.D.  ------------------------------------------------------------------------    < end of copied text >

## 2019-10-14 NOTE — PHARMACOTHERAPY INTERVENTION NOTE - COMMENTS
Patient with type 2 diabetes (A1C 6.7) on Lantus 14 units once daily at bedtime. 10/3 fingerstick glucose: 206, 287, 318, 322. Recommended Humalog 3 units three times daily before meals.    Thea Costa, PharmD  PGY-1 Pharmacy Resident  [phone]

## 2019-10-14 NOTE — PROGRESS NOTE ADULT - ASSESSMENT
1.s/p NSTEMI  2, acute on chronic renal insufficency  3.no further chest pain  4.     Recommend   hold off on lasix and arb  continue metoprolol  when BP and renal fucntion stable add ARB

## 2019-10-14 NOTE — PROGRESS NOTE ADULT - PROBLEM SELECTOR PLAN 4
- CAD s/p stents, remote hx of MI  - ASA held last admission 2/2 GIB  - Presents with NSTEMI  - c/w ASA, plavix  - Atorvastatin 80mg QHS  - Metoprolol tartrate 25mg PO BID - CAD s/p stents, remote hx of MI  - ASA held last admission 2/2 GIB  - Presents with NSTEMI  - c/w ASA, Plavix  - Atorvastatin 80mg QHS  - Metoprolol tartrate 25mg PO BID - Recently admitted for workup of melena concerning for UGIB; ASA held, plavix continued. Hgb stable so no intervention. Reports dark stools  - Received s/p 1U PRBC in ED  - GI consulted - no plan for endoscopy  - CBC Q12 for now, VSQ4hr  - Transfuse prn Hgb<8  - Protonix IV 40mg BID

## 2019-10-14 NOTE — PROGRESS NOTE ADULT - PROBLEM SELECTOR PLAN 8
DVT: SQH  Diet: DASH/TLC/CC  Dispo: Pending  Code: FULL - c/w ASA, Plavix  - If Hgb continues to drop, consider holding

## 2019-10-14 NOTE — PROGRESS NOTE ADULT - PROBLEM SELECTOR PLAN 1
- Patient febrile to 101.8 + WBC elevated  - F/u Bcx, UA, Ucx  - Start on empiric vancomycin and zosyn - Patient febrile to 101.8 + WBC elevated  - Ddx: Infectious vs postprocedural (TTE)  - F/u Bcx, UA, Ucx  - Start on empiric vancomycin and zosyn - Patient febrile to 101.8 + WBC elevated  - Ddx: Infectious vs DVT; patient reports cough, unable to expectorate mucus; also reports LLE tenderness   - F/u Bcx, UA, Ucx  - Repeat CXR  - LLE Duplex to r/o DVT  - Start on empiric vancomycin and zosyn - Patient febrile to 101.8 + WBC elevated  - Ddx: Infectious vs DVT; patient reports cough, unable to expectorate mucus; also reports LLE tenderness   - F/u Bcx, UA, Ucx  - Previous CXR demonstrates L base atelectasis  - LLE Duplex to r/o DVT  - Start on empiric vancomycin and zosyn  - Incentive spirometry, chest PT as necessary - Patient febrile to 101.8 + WBC elevated  - Ddx: Infectious vs DVT; patient reports cough, unable to expectorate mucus; also reports LLE tenderness   - F/u Bcx, UA, Ucx  - Previous CXR demonstrates L base atelectasis  - B/l LE Duplex to r/o DVT  - F/u urine legionella  - Start on fluoroquinolone to treat for CAP, will broaden based on clinical response  - Incentive spirometry, chest PT as necessary - Patient febrile to 101.8 + WBC elevated  - Ddx: Infectious vs DVT; patient reports cough, unable to expectorate mucus; also reports LLE tenderness   - F/u Bcx, UA, Ucx  - Previous CXR demonstrates L base atelectasis  - B/l LE Duplex to r/o DVT  - F/u urine legionella  - Will start Rx for CAP, will broaden based on clinical response  - Incentive spirometry, chest PT as necessary

## 2019-10-14 NOTE — PROGRESS NOTE ADULT - PROBLEM SELECTOR PLAN 2
- JACQUELINE in anterior leads, STD in precordial leads, diffuse on repeat EKG  - Troponin 1163 --> ST Depression MI  - Appreciate cardiology and CCU recommendations.   - Given recent history of GIB will management conservatively without heparin gtt  - ASA 81 daily  - Plavix 75 mg daily   - Metoprolol tartrate 25mg po BID  - TTE 2D - Moderate segmental LV systolic dysfunction  - Primary Cardiologist Dr. Holman following - Increase in Cr > 30% form baseline  - Ddx includes iatrogenic (started on lasix yesterday), infections (meets SIRS criteria, possibly due to PNA), dehydration (2/2 lasix administration)  - F/u FeUrea, urine sodium creatinine, osm  - Hold lasix  - Gentle hydration given patient cardiac history

## 2019-10-14 NOTE — PROGRESS NOTE ADULT - SUBJECTIVE AND OBJECTIVE BOX
Contact Information:  Sam Carias II, MD, MPH  PGY-1, Internal Medicine  Pager: [phone] ([facility]) /// [phone] ([facility])    HOME ADLER, MRN-[id number]    Patient is a 89y old  Male who presents with a chief complaint of NSTEMI (13 Oct 2019 15:34)      OVERNIGHT EVENTS:    SUBJECTIVE:    CONSTITUTIONAL: No weakness. No fatigue. No fever.  HEAD: No head trauma.   EYES: No vision changes.  ENT: No hearing changes or tinnitus. No ear pain. No changes in smell. No nasal congestion or discharge. No sore throat. No voice hoarseness.   NECK: No neck pain or stiffness. No lumps.  RESPIRATORY: No cough. No SOB. No wheezing. No hemoptysis.   CARDIOVASCULAR: No chest pain. No palpitations.   GASTROINTESTINAL: No dysphagia. No ABD pain. No distension. No constipation. No diarrhea. No pain with defecation. No hematemesis. No hematochezia or melena.  BACK: No back pain.  GENITOURINARY: No dysuria. No frequency or urgency. No hesitancy. No incontinence. No urinary retention. No suprapubic pain. No hematuria.  EXTREMITY: No swelling.  MUSCULOSKELETAL: No joint pain or swelling. No fractures. No stiffness.    SKIN: No rashes. No itching. No skin, hair, or nail changes.  NEUROLOGICAL: No weakness or paralysis. No lightheadedness or dizziness. No HA. No numbness or tingling.   PSYCHIATRIC: No depression.       OBJECTIVE:  Vital Signs Last 24 Hrs  T(C): 37 (14 Oct 2019 05:10), Max: 38.8 (13 Oct 2019 23:39)  T(F): 98.6 (14 Oct 2019 05:10), Max: 101.8 (13 Oct 2019 23:39)  HR: 76 (14 Oct 2019 05:10) (76 - 82)  BP: 98/59 (14 Oct 2019 05:10) (98/59 - 127/61)  BP(mean): --  RR: 18 (14 Oct 2019 05:10) (17 - 19)  SpO2: 93% (14 Oct 2019 05:10) (88% - 97%)  I&O's Summary    12 Oct 2019 07:01  -  13 Oct 2019 07:00  --------------------------------------------------------  IN: 900 mL / OUT: 400 mL / NET: 500 mL    13 Oct 2019 07:01  -  14 Oct 2019 06:54  --------------------------------------------------------  IN: 720 mL / OUT: 800 mL / NET: -80 mL        MEDICATIONS  (STANDING):  aspirin enteric coated 81 milliGRAM(s) Oral daily  atorvastatin 80 milliGRAM(s) Oral at bedtime  cholecalciferol 1000 Unit(s) Oral daily  clopidogrel Tablet 75 milliGRAM(s) Oral daily  dextrose 5%. 1000 milliLiter(s) (50 mL/Hr) IV Continuous <Continuous>  dextrose 50% Injectable 12.5 Gram(s) IV Push once  dextrose 50% Injectable 25 Gram(s) IV Push once  dextrose 50% Injectable 25 Gram(s) IV Push once  donepezil 10 milliGRAM(s) Oral at bedtime  DULoxetine 30 milliGRAM(s) Oral daily  furosemide    Tablet 20 milliGRAM(s) Oral two times a day  insulin glargine Injectable (LANTUS) 14 Unit(s) SubCutaneous at bedtime  insulin lispro (HumaLOG) corrective regimen sliding scale   SubCutaneous at bedtime  insulin lispro (HumaLOG) corrective regimen sliding scale   SubCutaneous three times a day before meals  metoprolol tartrate 25 milliGRAM(s) Oral two times a day  pantoprazole  Injectable 40 milliGRAM(s) IV Push two times a day  phenytoin   Capsule 300 milliGRAM(s) Oral at bedtime    MEDICATIONS  (PRN):  dextrose 40% Gel 15 Gram(s) Oral once PRN Blood Glucose LESS THAN 70 milliGRAM(s)/deciliter  glucagon  Injectable 1 milliGRAM(s) IntraMuscular once PRN Glucose LESS THAN 70 milligrams/deciliter    Allergies    No Known Allergies    Intolerances        CONSTITUTIONAL: No acute distress. Awake and alert.  HEAD: No evidence of trauma. Structures WNL.  EYES: +PERRL. +EOMI. No scleral icterus. No conjunctival injection.  ENT: Hearing intact b/l. Moist oral mucosa. No abnormal lesions. No erythema. No pharyngeal exudates.   NECK: Supple. Appropriate ROM. No stiffness. No masses or lymphadenopathy.  RESPIRATORY: CTAB. No wheezes, rales, or rhonchi. No accessory muscle use. No apparent respiratory distress.  CARDIOVASCULAR: +S1/S2. No audible S3/S4. Regular rate and rhythm. No murmurs, rubs, or gallops.  GASTROINTESTINAL: Soft, nontender, nondistended. +BS. No rebound or guarding.   BACK: No spinal or paraspinal tenderness. No CVA tenderness.  GENITOURINARY: Normal appearing genitalia.  VASCULAR: 2+ radial pulses x b/l UE; 2+ DP pulses x b/l LE.  EXTREMITY: No LE swelling or edema. EXTs warm to touch.  MUSCULOSKELETAL: 5/5 strength x 4EXT. Movement evident in all limbs. No tenderness on palpation.  DERMATOLOGICAL: No abnormal rashes or lesions. No evident hair, skin, or nail changes.  NEUROLOGICAL: CN 2-12 grossly intact. No focal deficits. Sensation intact x 4EXT. A&Ox3 (oriented to person, place, and time).  PSYCHIATRIC: Appropriate affect.                            8.4    11.37 )-----------( 190      ( 14 Oct 2019 06:06 )             26.3       10-14    137  |  104  |  36<H>  ----------------------------<  155<H>  4.7   |  26  |  1.94<H>    Ca    8.2<L>      14 Oct 2019 06:06  Phos  3.9     10-14  Mg     2.2     10-14    TPro  5.9<L>  /  Alb  3.0<L>  /  TBili  0.2  /  DBili  x   /  AST  26  /  ALT  30  /  AlkPhos  119  10-14    CAPILLARY BLOOD GLUCOSE      POCT Blood Glucose.: 322 mg/dL (13 Oct 2019 21:38)  POCT Blood Glucose.: 318 mg/dL (13 Oct 2019 17:25)  POCT Blood Glucose.: 287 mg/dL (13 Oct 2019 12:29)  POCT Blood Glucose.: 206 mg/dL (13 Oct 2019 09:46)    LIVER FUNCTIONS - ( 14 Oct 2019 06:06 )  Alb: 3.0 g/dL / Pro: 5.9 g/dL / ALK PHOS: 119 U/L / ALT: 30 U/L / AST: 26 U/L / GGT: x                       RADIOLOGY AND ADDITIONAL TESTS:    CONSULTANT NOTES REVIEWED:    CARE DISCUSSED WITH THE FOLLOWING CONSULTANTS/PROVIDERS: Contact Information:  Sam Carias II, MD, MPH  PGY-1, Internal Medicine  Pager: [phone] ([facility]) /// [phone] ([facility])    HOME ADLER, MRN-[id number]    Patient is a 89y old  Male who presents with a chief complaint of NSTEMI (13 Oct 2019 15:34)      OVERNIGHT EVENTS: Patient febrile to 101.8. Blood culture drawn.     SUBJECTIVE: Patient found sitting upright in chair beside bed. C/o cough that he states is resolving. Denies HA, N/V, chills, pain, lightheadedness, dizziness, SOB.    CONSTITUTIONAL: No weakness. No fatigue. No fever.  HEAD: No head trauma.   EYES: No vision changes.  ENT: No hearing changes or tinnitus. No ear pain. No changes in smell. No nasal congestion or discharge. No sore throat. No voice hoarseness.   NECK: No neck pain or stiffness. No lumps.  RESPIRATORY: +Cough. No SOB. No wheezing. No hemoptysis.   CARDIOVASCULAR: No chest pain. No palpitations.   GASTROINTESTINAL: No dysphagia. No ABD pain. No distension. No constipation. No diarrhea. No pain with defecation. No hematemesis. No hematochezia or melena.  BACK: No back pain.  GENITOURINARY: No dysuria. No frequency or urgency. No hesitancy. No incontinence. No urinary retention. No suprapubic pain. No hematuria.  EXTREMITY: No swelling.  MUSCULOSKELETAL: No joint pain or swelling. No fractures. No stiffness.    SKIN: No rashes. No itching. No skin, hair, or nail changes.  NEUROLOGICAL: No weakness or paralysis. No lightheadedness or dizziness. No HA. No numbness or tingling.   PSYCHIATRIC: No depression.       OBJECTIVE:  Vital Signs Last 24 Hrs  T(C): 37 (14 Oct 2019 05:10), Max: 38.8 (13 Oct 2019 23:39)  T(F): 98.6 (14 Oct 2019 05:10), Max: 101.8 (13 Oct 2019 23:39)  HR: 76 (14 Oct 2019 05:10) (76 - 82)  BP: 98/59 (14 Oct 2019 05:10) (98/59 - 127/61)  BP(mean): --  RR: 18 (14 Oct 2019 05:10) (17 - 19)  SpO2: 93% (14 Oct 2019 05:10) (88% - 97%)  I&O's Summary    12 Oct 2019 07:01  -  13 Oct 2019 07:00  --------------------------------------------------------  IN: 900 mL / OUT: 400 mL / NET: 500 mL    13 Oct 2019 07:01  -  14 Oct 2019 06:54  --------------------------------------------------------  IN: 720 mL / OUT: 800 mL / NET: -80 mL        MEDICATIONS  (STANDING):  aspirin enteric coated 81 milliGRAM(s) Oral daily  atorvastatin 80 milliGRAM(s) Oral at bedtime  cholecalciferol 1000 Unit(s) Oral daily  clopidogrel Tablet 75 milliGRAM(s) Oral daily  dextrose 5%. 1000 milliLiter(s) (50 mL/Hr) IV Continuous <Continuous>  dextrose 50% Injectable 12.5 Gram(s) IV Push once  dextrose 50% Injectable 25 Gram(s) IV Push once  dextrose 50% Injectable 25 Gram(s) IV Push once  donepezil 10 milliGRAM(s) Oral at bedtime  DULoxetine 30 milliGRAM(s) Oral daily  furosemide    Tablet 20 milliGRAM(s) Oral two times a day  insulin glargine Injectable (LANTUS) 14 Unit(s) SubCutaneous at bedtime  insulin lispro (HumaLOG) corrective regimen sliding scale   SubCutaneous at bedtime  insulin lispro (HumaLOG) corrective regimen sliding scale   SubCutaneous three times a day before meals  metoprolol tartrate 25 milliGRAM(s) Oral two times a day  pantoprazole  Injectable 40 milliGRAM(s) IV Push two times a day  phenytoin   Capsule 300 milliGRAM(s) Oral at bedtime    MEDICATIONS  (PRN):  dextrose 40% Gel 15 Gram(s) Oral once PRN Blood Glucose LESS THAN 70 milliGRAM(s)/deciliter  glucagon  Injectable 1 milliGRAM(s) IntraMuscular once PRN Glucose LESS THAN 70 milligrams/deciliter    Allergies    No Known Allergies    Intolerances        CONSTITUTIONAL: No acute distress. Awake and alert.  HEAD: No evidence of trauma. Structures WNL.  EYES: +PERRL. No conjunctival injection.  ENT: Hearing intact b/l. Moist oral mucosa.   NECK: Supple. No masses or lymphadenopathy.  RESPIRATORY: CTAB. No wheezes, rales, or rhonchi. No accessory muscle use. No apparent respiratory distress.  CARDIOVASCULAR: +S1/S2. No audible S3/S4. Regular rate and rhythm. Telemetry - sinus rhythm, 80-90s. No murmurs, rubs, or gallops.  GASTROINTESTINAL: Soft, nontender, nondistended. +BS. No rebound or guarding. .  EXTREMITY: No LE swelling or edema. EXTs warm to touch.  MUSCULOSKELETAL: 5/5 strength x 4EXT. Movement evident in all limbs. No tenderness on palpation.  DERMATOLOGICAL: No abnormal rashes or lesions. No evident hair, skin, or nail changes.  NEUROLOGICAL: CN 2-12 grossly intact. No focal deficits. Sensation intact x 4EXT. A&Ox3 (oriented to person, place, and time).                            8.4    11.37 )-----------( 190      ( 14 Oct 2019 06:06 )             26.3       10-14    137  |  104  |  36<H>  ----------------------------<  155<H>  4.7   |  26  |  1.94<H>    Ca    8.2<L>      14 Oct 2019 06:06  Phos  3.9     10-14  Mg     2.2     10-14    TPro  5.9<L>  /  Alb  3.0<L>  /  TBili  0.2  /  DBili  x   /  AST  26  /  ALT  30  /  AlkPhos  119  10-14    CAPILLARY BLOOD GLUCOSE      POCT Blood Glucose.: 322 mg/dL (13 Oct 2019 21:38)  POCT Blood Glucose.: 318 mg/dL (13 Oct 2019 17:25)  POCT Blood Glucose.: 287 mg/dL (13 Oct 2019 12:29)  POCT Blood Glucose.: 206 mg/dL (13 Oct 2019 09:46)    LIVER FUNCTIONS - ( 14 Oct 2019 06:06 )  Alb: 3.0 g/dL / Pro: 5.9 g/dL / ALK PHOS: 119 U/L / ALT: 30 U/L / AST: 26 U/L / GGT: x                       RADIOLOGY AND ADDITIONAL TESTS:    CONSULTANT NOTES REVIEWED:    CARE DISCUSSED WITH THE FOLLOWING CONSULTANTS/PROVIDERS: Contact Information:  Sam Carias II, MD, MPH  PGY-1, Internal Medicine  Pager: [phone] ([facility]) /// [phone] ([facility])    HOME ADLER, MRN-[id number]    Patient is a 89y old  Male who presents with a chief complaint of NSTEMI (13 Oct 2019 15:34)      OVERNIGHT EVENTS: Patient febrile to 101.8. Blood culture drawn.     SUBJECTIVE: Patient found sitting upright in chair beside bed. C/o cough that he states is resolving. Denies HA, N/V, chills, pain, lightheadedness, dizziness, SOB. Patient states that he does have some pain in his left leg.    CONSTITUTIONAL: No weakness. No fatigue. No fever.  HEAD: No head trauma.   EYES: No vision changes.  ENT: No hearing changes or tinnitus. No ear pain. No changes in smell. No nasal congestion or discharge. No sore throat. No voice hoarseness.   NECK: No neck pain or stiffness. No lumps.  RESPIRATORY: +Cough. No SOB. No wheezing. No hemoptysis.   CARDIOVASCULAR: No chest pain. No palpitations.   GASTROINTESTINAL: No dysphagia. No ABD pain. No distension. No constipation. No diarrhea. No pain with defecation. No hematemesis. No hematochezia or melena.  BACK: No back pain.  GENITOURINARY: No dysuria. No frequency or urgency. No hesitancy. No incontinence. No urinary retention. No suprapubic pain. No hematuria.  EXTREMITY: No swelling.  MUSCULOSKELETAL: No joint pain or swelling. No fractures. No stiffness.    SKIN: No rashes. No itching. No skin, hair, or nail changes.  NEUROLOGICAL: No weakness or paralysis. No lightheadedness or dizziness. No HA. No numbness or tingling.   PSYCHIATRIC: No depression.       OBJECTIVE:  Vital Signs Last 24 Hrs  T(C): 37 (14 Oct 2019 05:10), Max: 38.8 (13 Oct 2019 23:39)  T(F): 98.6 (14 Oct 2019 05:10), Max: 101.8 (13 Oct 2019 23:39)  HR: 76 (14 Oct 2019 05:10) (76 - 82)  BP: 98/59 (14 Oct 2019 05:10) (98/59 - 127/61)  BP(mean): --  RR: 18 (14 Oct 2019 05:10) (17 - 19)  SpO2: 93% (14 Oct 2019 05:10) (88% - 97%)  I&O's Summary    12 Oct 2019 07:01  -  13 Oct 2019 07:00  --------------------------------------------------------  IN: 900 mL / OUT: 400 mL / NET: 500 mL    13 Oct 2019 07:01  -  14 Oct 2019 06:54  --------------------------------------------------------  IN: 720 mL / OUT: 800 mL / NET: -80 mL        MEDICATIONS  (STANDING):  aspirin enteric coated 81 milliGRAM(s) Oral daily  atorvastatin 80 milliGRAM(s) Oral at bedtime  cholecalciferol 1000 Unit(s) Oral daily  clopidogrel Tablet 75 milliGRAM(s) Oral daily  dextrose 5%. 1000 milliLiter(s) (50 mL/Hr) IV Continuous <Continuous>  dextrose 50% Injectable 12.5 Gram(s) IV Push once  dextrose 50% Injectable 25 Gram(s) IV Push once  dextrose 50% Injectable 25 Gram(s) IV Push once  donepezil 10 milliGRAM(s) Oral at bedtime  DULoxetine 30 milliGRAM(s) Oral daily  furosemide    Tablet 20 milliGRAM(s) Oral two times a day  insulin glargine Injectable (LANTUS) 14 Unit(s) SubCutaneous at bedtime  insulin lispro (HumaLOG) corrective regimen sliding scale   SubCutaneous at bedtime  insulin lispro (HumaLOG) corrective regimen sliding scale   SubCutaneous three times a day before meals  metoprolol tartrate 25 milliGRAM(s) Oral two times a day  pantoprazole  Injectable 40 milliGRAM(s) IV Push two times a day  phenytoin   Capsule 300 milliGRAM(s) Oral at bedtime    MEDICATIONS  (PRN):  dextrose 40% Gel 15 Gram(s) Oral once PRN Blood Glucose LESS THAN 70 milliGRAM(s)/deciliter  glucagon  Injectable 1 milliGRAM(s) IntraMuscular once PRN Glucose LESS THAN 70 milligrams/deciliter    Allergies    No Known Allergies    Intolerances        CONSTITUTIONAL: No acute distress. Awake and alert.  HEAD: No evidence of trauma. Structures WNL.  EYES: +PERRL. No conjunctival injection.  ENT: Hearing intact b/l. Moist oral mucosa.   NECK: Supple. No masses or lymphadenopathy.  RESPIRATORY: CTAB. No wheezes, rales, or rhonchi. No accessory muscle use. No apparent respiratory distress.  CARDIOVASCULAR: +S1/S2. No audible S3/S4. Regular rate and rhythm. Telemetry - sinus rhythm, 80-90s. No murmurs, rubs, or gallops.  GASTROINTESTINAL: Soft, nontender, nondistended. +BS. No rebound or guarding. .  EXTREMITY: No LE swelling or edema. EXTs warm to touch.  MUSCULOSKELETAL: 5/5 strength x 4EXT. Movement evident in all limbs. L leg tender to palpation.  DERMATOLOGICAL: No abnormal rashes or lesions. No evident hair, skin, or nail changes.  NEUROLOGICAL: CN 2-12 grossly intact. No focal deficits. Sensation intact x 4EXT. A&Ox3 (oriented to person, place, and time).                            8.4    11.37 )-----------( 190      ( 14 Oct 2019 06:06 )             26.3       10-14    137  |  104  |  36<H>  ----------------------------<  155<H>  4.7   |  26  |  1.94<H>    Ca    8.2<L>      14 Oct 2019 06:06  Phos  3.9     10-14  Mg     2.2     10-14    TPro  5.9<L>  /  Alb  3.0<L>  /  TBili  0.2  /  DBili  x   /  AST  26  /  ALT  30  /  AlkPhos  119  10-14    CAPILLARY BLOOD GLUCOSE      POCT Blood Glucose.: 322 mg/dL (13 Oct 2019 21:38)  POCT Blood Glucose.: 318 mg/dL (13 Oct 2019 17:25)  POCT Blood Glucose.: 287 mg/dL (13 Oct 2019 12:29)  POCT Blood Glucose.: 206 mg/dL (13 Oct 2019 09:46)    LIVER FUNCTIONS - ( 14 Oct 2019 06:06 )  Alb: 3.0 g/dL / Pro: 5.9 g/dL / ALK PHOS: 119 U/L / ALT: 30 U/L / AST: 26 U/L / GGT: x                       RADIOLOGY AND ADDITIONAL TESTS:    CONSULTANT NOTES REVIEWED:    CARE DISCUSSED WITH THE FOLLOWING CONSULTANTS/PROVIDERS: Contact Information:  Sam Carias II, MD, MPH  PGY-1, Internal Medicine  Pager: [phone] ([facility]) /// [phone] ([facility])    HOME ADLER, MRN-[id number]    Patient is a 89y old  Male who presents with a chief complaint of NSTEMI (13 Oct 2019 15:34)      OVERNIGHT EVENTS: Patient febrile to 101.8. Blood culture drawn.     SUBJECTIVE: Patient found sitting upright in chair beside bed. C/o cough that he states is resolving. Denies HA, N/V, chills, pain, lightheadedness, dizziness, SOB. Patient states that he does have some pain in his left leg.    CONSTITUTIONAL: No weakness. No fatigue. No fever.  HEAD: No head trauma.   EYES: No vision changes.  ENT: No hearing changes or tinnitus. No ear pain. No changes in smell. No nasal congestion or discharge. No sore throat. No voice hoarseness.   NECK: No neck pain or stiffness. No lumps.  RESPIRATORY: +Cough. No SOB. No wheezing. No hemoptysis.   CARDIOVASCULAR: No chest pain. No palpitations.   GASTROINTESTINAL: No dysphagia. No ABD pain. No distension. No constipation. No diarrhea. No pain with defecation. No hematemesis. No hematochezia or melena.  BACK: No back pain.  GENITOURINARY: No dysuria. No frequency or urgency. No hesitancy. No incontinence. No urinary retention. No suprapubic pain. No hematuria.  EXTREMITY: No swelling.  MUSCULOSKELETAL: No joint pain or swelling. No fractures. No stiffness.    SKIN: No rashes. No itching. No skin, hair, or nail changes.  NEUROLOGICAL: No weakness or paralysis. No lightheadedness or dizziness. No HA. No numbness or tingling.   PSYCHIATRIC: No depression.       OBJECTIVE:  Vital Signs Last 24 Hrs  T(C): 37 (14 Oct 2019 05:10), Max: 38.8 (13 Oct 2019 23:39)  T(F): 98.6 (14 Oct 2019 05:10), Max: 101.8 (13 Oct 2019 23:39)  HR: 76 (14 Oct 2019 05:10) (76 - 82)  BP: 98/59 (14 Oct 2019 05:10) (98/59 - 127/61)  BP(mean): --  RR: 18 (14 Oct 2019 05:10) (17 - 19)  SpO2: 93% (14 Oct 2019 05:10) (88% - 97%)  I&O's Summary    12 Oct 2019 07:01  -  13 Oct 2019 07:00  --------------------------------------------------------  IN: 900 mL / OUT: 400 mL / NET: 500 mL    13 Oct 2019 07:01  -  14 Oct 2019 06:54  --------------------------------------------------------  IN: 720 mL / OUT: 800 mL / NET: -80 mL        MEDICATIONS  (STANDING):  aspirin enteric coated 81 milliGRAM(s) Oral daily  atorvastatin 80 milliGRAM(s) Oral at bedtime  cholecalciferol 1000 Unit(s) Oral daily  clopidogrel Tablet 75 milliGRAM(s) Oral daily  dextrose 5%. 1000 milliLiter(s) (50 mL/Hr) IV Continuous <Continuous>  dextrose 50% Injectable 12.5 Gram(s) IV Push once  dextrose 50% Injectable 25 Gram(s) IV Push once  dextrose 50% Injectable 25 Gram(s) IV Push once  donepezil 10 milliGRAM(s) Oral at bedtime  DULoxetine 30 milliGRAM(s) Oral daily  furosemide    Tablet 20 milliGRAM(s) Oral two times a day  insulin glargine Injectable (LANTUS) 14 Unit(s) SubCutaneous at bedtime  insulin lispro (HumaLOG) corrective regimen sliding scale   SubCutaneous at bedtime  insulin lispro (HumaLOG) corrective regimen sliding scale   SubCutaneous three times a day before meals  metoprolol tartrate 25 milliGRAM(s) Oral two times a day  pantoprazole  Injectable 40 milliGRAM(s) IV Push two times a day  phenytoin   Capsule 300 milliGRAM(s) Oral at bedtime    MEDICATIONS  (PRN):  dextrose 40% Gel 15 Gram(s) Oral once PRN Blood Glucose LESS THAN 70 milliGRAM(s)/deciliter  glucagon  Injectable 1 milliGRAM(s) IntraMuscular once PRN Glucose LESS THAN 70 milligrams/deciliter    Allergies    No Known Allergies    Intolerances        CONSTITUTIONAL: No acute distress. Awake and alert.  HEAD: No evidence of trauma. Structures WNL.  EYES: +PERRL. No conjunctival injection.  ENT: Hearing intact b/l. Moist oral mucosa.   NECK: Supple. No masses or lymphadenopathy.  RESPIRATORY: CTAB. No wheezes, rales, or rhonchi. Dec bs at the bases. No accessory muscle use. No apparent respiratory distress.  CARDIOVASCULAR: +S1/S2. No audible S3/S4. Regular rate and rhythm. Telemetry - sinus rhythm, 80-90s. No murmurs, rubs, or gallops.  GASTROINTESTINAL: Soft, nontender, nondistended. +BS. No rebound or guarding. .  EXTREMITY: No LE swelling or edema. EXTs warm to touch.  MUSCULOSKELETAL: 5/5 strength x 4EXT. Movement evident in all limbs. L leg tender to palpation.  DERMATOLOGICAL: No abnormal rashes or lesions. No evident hair, skin, or nail changes.  NEUROLOGICAL: CN 2-12 grossly intact. No focal deficits. Sensation intact x 4EXT. A&Ox3 (oriented to person, place, and time).                            8.4    11.37 )-----------( 190      ( 14 Oct 2019 06:06 )             26.3       10-14    137  |  104  |  36<H>  ----------------------------<  155<H>  4.7   |  26  |  1.94<H>    Ca    8.2<L>      14 Oct 2019 06:06  Phos  3.9     10-14  Mg     2.2     10-14    TPro  5.9<L>  /  Alb  3.0<L>  /  TBili  0.2  /  DBili  x   /  AST  26  /  ALT  30  /  AlkPhos  119  10-14    CAPILLARY BLOOD GLUCOSE      POCT Blood Glucose.: 322 mg/dL (13 Oct 2019 21:38)  POCT Blood Glucose.: 318 mg/dL (13 Oct 2019 17:25)  POCT Blood Glucose.: 287 mg/dL (13 Oct 2019 12:29)  POCT Blood Glucose.: 206 mg/dL (13 Oct 2019 09:46)    LIVER FUNCTIONS - ( 14 Oct 2019 06:06 )  Alb: 3.0 g/dL / Pro: 5.9 g/dL / ALK PHOS: 119 U/L / ALT: 30 U/L / AST: 26 U/L / GGT: x           RADIOLOGY AND ADDITIONAL TESTS: PERSONALLY REVIEWED.  CXR: right linear atelectasis vs pneumonia.

## 2019-10-14 NOTE — PROVIDER CONTACT NOTE (OTHER) - ASSESSMENT
Pt VSS. Pt experienced bradycardia with HR down to 41 and 1.45 second pauses on tele. Pt asymptomatic.
pt A&Ox4, other vss. Pt c/o dry cough since admission.
pt A&OX4, other vss. Pt c/o dry cough.

## 2019-10-14 NOTE — PHARMACOTHERAPY INTERVENTION NOTE - COMMENTS
Patient on with CrCl 23.55 mL/min (SCr 1.94) on duloxetine 30 mg once daily. Recommended discontinuation of duloxetine, as it is not recommended in patients with CrCl <30 mL/min.    Thea Costa, PharmD  PGY-1 Pharmacy Resident  [phone] Patient with UDAY on duloxetine 30 mg once daily (SCr 1.94, calculated CrCl 23.55 mL/min). Recommended discontinuation of duloxetine, as it is not recommended in patients with CrCl <30 mL/min.    Thea Costa, PharmD  PGY-1 Pharmacy Resident  [phone]

## 2019-10-14 NOTE — PROGRESS NOTE ADULT - PROBLEM SELECTOR PLAN 6
- Home dependent Lantus 22U QHS, 10U premeal  - Most recent admission was Lantus 14U QHS, and 6U premeal  - Continue with lantus 14U QHS monitor off premeal insulin (can be restarted if persistently hyperglycemic)  - ISS - Home dependent Lantus 22U QHS, 10U premeal  - Most recent admission was Lantus 14U QHS, and 6U premeal  - Continue with Lantus 14U QHS  - ISS - CKD without UDAY creatinine at baseline

## 2019-10-14 NOTE — PROGRESS NOTE ADULT - ASSESSMENT
89 yo M PMHx CAD s/p stents, CEA, PVD s/p stents, dementia, GI bleed presents sent in by Cardiologist for management of NSTEMI. Patient clinically stable, being followed by outpatient Cardiologist Dr. Holman. 89 yo M PMHx CAD s/p stents, CEA, PVD s/p stents, dementia, GI bleed presents sent in by Cardiologist for management of NSTEMI. Patient clinically stable, being followed by outpatient Cardiologist Dr. Holman. Newly febrile with elevated WBC, blood cultures drawn.

## 2019-10-15 DIAGNOSIS — N17.0 ACUTE KIDNEY FAILURE WITH TUBULAR NECROSIS: ICD-10-CM

## 2019-10-15 DIAGNOSIS — J14 PNEUMONIA DUE TO HEMOPHILUS INFLUENZAE: ICD-10-CM

## 2019-10-15 DIAGNOSIS — A41.9 SEPSIS, UNSPECIFIED ORGANISM: ICD-10-CM

## 2019-10-15 LAB
ANION GAP SERPL CALC-SCNC: 11 MMOL/L — SIGNIFICANT CHANGE UP (ref 5–17)
BUN SERPL-MCNC: 31 MG/DL — HIGH (ref 7–23)
CALCIUM SERPL-MCNC: 8.3 MG/DL — LOW (ref 8.4–10.5)
CHLORIDE SERPL-SCNC: 103 MMOL/L — SIGNIFICANT CHANGE UP (ref 96–108)
CO2 SERPL-SCNC: 24 MMOL/L — SIGNIFICANT CHANGE UP (ref 22–31)
CREAT ?TM UR-MCNC: 92 MG/DL — SIGNIFICANT CHANGE UP
CREAT SERPL-MCNC: 1.81 MG/DL — HIGH (ref 0.5–1.3)
GLUCOSE BLDC GLUCOMTR-MCNC: 214 MG/DL — HIGH (ref 70–99)
GLUCOSE BLDC GLUCOMTR-MCNC: 243 MG/DL — HIGH (ref 70–99)
GLUCOSE BLDC GLUCOMTR-MCNC: 274 MG/DL — HIGH (ref 70–99)
GLUCOSE BLDC GLUCOMTR-MCNC: 292 MG/DL — HIGH (ref 70–99)
GLUCOSE SERPL-MCNC: 222 MG/DL — HIGH (ref 70–99)
GRAM STN FLD: SIGNIFICANT CHANGE UP
HAEM INFLU DNA BLD POS QL NAA+NON-PROBE: SIGNIFICANT CHANGE UP
HCT VFR BLD CALC: 26.3 % — LOW (ref 39–50)
HGB BLD-MCNC: 8.3 G/DL — LOW (ref 13–17)
LEGIONELLA AG UR QL: NEGATIVE — SIGNIFICANT CHANGE UP
MAGNESIUM SERPL-MCNC: 2 MG/DL — SIGNIFICANT CHANGE UP (ref 1.6–2.6)
MCHC RBC-ENTMCNC: 31.6 GM/DL — LOW (ref 32–36)
MCHC RBC-ENTMCNC: 33.3 PG — SIGNIFICANT CHANGE UP (ref 27–34)
MCV RBC AUTO: 105.6 FL — HIGH (ref 80–100)
METHOD TYPE: SIGNIFICANT CHANGE UP
PHOSPHATE SERPL-MCNC: 3.3 MG/DL — SIGNIFICANT CHANGE UP (ref 2.5–4.5)
PLATELET # BLD AUTO: 192 K/UL — SIGNIFICANT CHANGE UP (ref 150–400)
POTASSIUM SERPL-MCNC: 5.2 MMOL/L — SIGNIFICANT CHANGE UP (ref 3.5–5.3)
POTASSIUM SERPL-SCNC: 5.2 MMOL/L — SIGNIFICANT CHANGE UP (ref 3.5–5.3)
RBC # BLD: 2.49 M/UL — LOW (ref 4.2–5.8)
RBC # FLD: 17.5 % — HIGH (ref 10.3–14.5)
SODIUM SERPL-SCNC: 138 MMOL/L — SIGNIFICANT CHANGE UP (ref 135–145)
SODIUM UR-SCNC: 47 MMOL/L — SIGNIFICANT CHANGE UP
UUN UR-MCNC: 759 MG/DL — SIGNIFICANT CHANGE UP
WBC # BLD: 11.37 K/UL — HIGH (ref 3.8–10.5)
WBC # FLD AUTO: 11.37 K/UL — HIGH (ref 3.8–10.5)

## 2019-10-15 PROCEDURE — 99233 SBSQ HOSP IP/OBS HIGH 50: CPT | Mod: GC

## 2019-10-15 PROCEDURE — 99222 1ST HOSP IP/OBS MODERATE 55: CPT

## 2019-10-15 RX ORDER — METOPROLOL TARTRATE 50 MG
50 TABLET ORAL DAILY
Refills: 0 | Status: DISCONTINUED | OUTPATIENT
Start: 2019-10-15 | End: 2019-10-22

## 2019-10-15 RX ORDER — SENNA PLUS 8.6 MG/1
2 TABLET ORAL AT BEDTIME
Refills: 0 | Status: DISCONTINUED | OUTPATIENT
Start: 2019-10-15 | End: 2019-10-22

## 2019-10-15 RX ORDER — POLYETHYLENE GLYCOL 3350 17 G/17G
17 POWDER, FOR SOLUTION ORAL DAILY
Refills: 0 | Status: DISCONTINUED | OUTPATIENT
Start: 2019-10-15 | End: 2019-10-22

## 2019-10-15 RX ORDER — ONDANSETRON 8 MG/1
4 TABLET, FILM COATED ORAL EVERY 8 HOURS
Refills: 0 | Status: DISCONTINUED | OUTPATIENT
Start: 2019-10-15 | End: 2019-10-22

## 2019-10-15 RX ORDER — CEFTRIAXONE 500 MG/1
2000 INJECTION, POWDER, FOR SOLUTION INTRAMUSCULAR; INTRAVENOUS EVERY 24 HOURS
Refills: 0 | Status: DISCONTINUED | OUTPATIENT
Start: 2019-10-15 | End: 2019-10-16

## 2019-10-15 RX ORDER — SODIUM CHLORIDE 9 MG/ML
1000 INJECTION INTRAMUSCULAR; INTRAVENOUS; SUBCUTANEOUS
Refills: 0 | Status: DISCONTINUED | OUTPATIENT
Start: 2019-10-15 | End: 2019-10-16

## 2019-10-15 RX ORDER — INSULIN LISPRO 100/ML
6 VIAL (ML) SUBCUTANEOUS
Refills: 0 | Status: DISCONTINUED | OUTPATIENT
Start: 2019-10-15 | End: 2019-10-22

## 2019-10-15 RX ORDER — DOCUSATE SODIUM 100 MG
100 CAPSULE ORAL THREE TIMES A DAY
Refills: 0 | Status: DISCONTINUED | OUTPATIENT
Start: 2019-10-15 | End: 2019-10-22

## 2019-10-15 RX ADMIN — Medication 6 UNIT(S): at 18:25

## 2019-10-15 RX ADMIN — Medication 3: at 18:25

## 2019-10-15 RX ADMIN — Medication 3 UNIT(S): at 13:04

## 2019-10-15 RX ADMIN — Medication 3: at 13:04

## 2019-10-15 RX ADMIN — PANTOPRAZOLE SODIUM 40 MILLIGRAM(S): 20 TABLET, DELAYED RELEASE ORAL at 18:29

## 2019-10-15 RX ADMIN — DONEPEZIL HYDROCHLORIDE 10 MILLIGRAM(S): 10 TABLET, FILM COATED ORAL at 22:31

## 2019-10-15 RX ADMIN — SODIUM CHLORIDE 50 MILLILITER(S): 9 INJECTION INTRAMUSCULAR; INTRAVENOUS; SUBCUTANEOUS at 09:31

## 2019-10-15 RX ADMIN — Medication 3 UNIT(S): at 09:30

## 2019-10-15 RX ADMIN — POLYETHYLENE GLYCOL 3350 17 GRAM(S): 17 POWDER, FOR SOLUTION ORAL at 18:36

## 2019-10-15 RX ADMIN — Medication 25 MILLIGRAM(S): at 05:58

## 2019-10-15 RX ADMIN — CEFTRIAXONE 100 MILLIGRAM(S): 500 INJECTION, POWDER, FOR SOLUTION INTRAMUSCULAR; INTRAVENOUS at 16:19

## 2019-10-15 RX ADMIN — SENNA PLUS 2 TABLET(S): 8.6 TABLET ORAL at 22:31

## 2019-10-15 RX ADMIN — Medication 2: at 09:30

## 2019-10-15 RX ADMIN — CLOPIDOGREL BISULFATE 75 MILLIGRAM(S): 75 TABLET, FILM COATED ORAL at 13:04

## 2019-10-15 RX ADMIN — INSULIN GLARGINE 14 UNIT(S): 100 INJECTION, SOLUTION SUBCUTANEOUS at 22:31

## 2019-10-15 RX ADMIN — PANTOPRAZOLE SODIUM 40 MILLIGRAM(S): 20 TABLET, DELAYED RELEASE ORAL at 05:59

## 2019-10-15 RX ADMIN — Medication 300 MILLIGRAM(S): at 22:31

## 2019-10-15 RX ADMIN — Medication 81 MILLIGRAM(S): at 13:05

## 2019-10-15 RX ADMIN — ATORVASTATIN CALCIUM 80 MILLIGRAM(S): 80 TABLET, FILM COATED ORAL at 22:31

## 2019-10-15 RX ADMIN — Medication 100 MILLIGRAM(S): at 22:32

## 2019-10-15 RX ADMIN — Medication 1000 UNIT(S): at 13:04

## 2019-10-15 NOTE — PROGRESS NOTE ADULT - ASSESSMENT
1.s/p NSTEMI  2, acute on chronic renal insufficency  3.no further chest pain  4. anemia stable  5. febrile ?pneumonia    Recommend  continue metoprolol so to toprol xl 50  when BP and renal fucntion stable add ARB  treat infection   ?rehab

## 2019-10-15 NOTE — PROGRESS NOTE ADULT - SUBJECTIVE AND OBJECTIVE BOX
Interval Events:   No bloody bm  No abdominal pain.    Hospital Medications:  aspirin enteric coated 81 milliGRAM(s) Oral daily  atorvastatin 80 milliGRAM(s) Oral at bedtime  azithromycin  IVPB 500 milliGRAM(s) IV Intermittent every 24 hours  azithromycin  IVPB      cefTRIAXone   IVPB 1000 milliGRAM(s) IV Intermittent every 24 hours  cholecalciferol 1000 Unit(s) Oral daily  clopidogrel Tablet 75 milliGRAM(s) Oral daily  dextrose 40% Gel 15 Gram(s) Oral once PRN  dextrose 5%. 1000 milliLiter(s) IV Continuous <Continuous>  dextrose 50% Injectable 12.5 Gram(s) IV Push once  dextrose 50% Injectable 25 Gram(s) IV Push once  dextrose 50% Injectable 25 Gram(s) IV Push once  donepezil 10 milliGRAM(s) Oral at bedtime  glucagon  Injectable 1 milliGRAM(s) IntraMuscular once PRN  insulin glargine Injectable (LANTUS) 14 Unit(s) SubCutaneous at bedtime  insulin lispro (HumaLOG) corrective regimen sliding scale   SubCutaneous at bedtime  insulin lispro (HumaLOG) corrective regimen sliding scale   SubCutaneous three times a day before meals  insulin lispro Injectable (HumaLOG) 3 Unit(s) SubCutaneous three times a day before meals  metoprolol tartrate 25 milliGRAM(s) Oral two times a day  ondansetron Injectable 4 milliGRAM(s) IV Push every 8 hours PRN  pantoprazole  Injectable 40 milliGRAM(s) IV Push two times a day  phenytoin   Capsule 300 milliGRAM(s) Oral at bedtime  sodium chloride 0.9%. 1000 milliLiter(s) IV Continuous <Continuous>        ROS:   General:  No fevers, chills or night sweats  ENT:  No sore throat or dysphagia  CV:  No pain or palpitations  Resp:  No dyspnea, cough or  wheezing  GI:  as above  Skin:  No rash or edema  Neuro: no weakness   Hematologic: no bleeding  Musculoskeletal: no muscle pain or join pain  Psych: no agitation      PHYSICAL EXAM:   Vital Signs:  Vital Signs Last 24 Hrs  T(C): 37.5 (15 Oct 2019 05:12), Max: 37.7 (14 Oct 2019 20:53)  T(F): 99.5 (15 Oct 2019 05:12), Max: 99.8 (14 Oct 2019 20:53)  HR: 84 (15 Oct 2019 08:20) (61 - 93)  BP: 117/66 (15 Oct 2019 08:20) (88/48 - 120/63)  BP(mean): --  RR: 18 (15 Oct 2019 05:12) (18 - 20)  SpO2: 91% (15 Oct 2019 05:12) (88% - 97%)  Daily     Daily Weight in k.4 (15 Oct 2019 08:18)    GENERAL:  NAD, Appears stated age  HEENT:  NC/AT,  conjunctivae clear and pink, sclera -anicteric  CHEST:  Normal Effort, Breath sounds clear  HEART:  RRR, S1 + S2, no murmurs  ABDOMEN:  Soft, non-tender, non-distended, normoactive bowel sounds,  no masses  EXTREMITIES:  no cyanosis or edema  SKIN:  Warm & Dry. No rash or erythema  NEURO:  Alert, oriented    LABS:                        8.3    11.37 )-----------( 192      ( 15 Oct 2019 08:38 )             26.3     Mean Cell Volume: 105.6 fl (10-15- @ 08:38)    10-15    138  |  103  |  31<H>  ----------------------------<  222<H>  5.2   |  24  |  1.81<H>    Ca    8.3<L>      15 Oct 2019 06:25  Phos  3.3     10-15  Mg     2.0     10-15    TPro  5.9<L>  /  Alb  3.0<L>  /  TBili  0.2  /  DBili  x   /  AST  26  /  ALT  30  /  AlkPhos  119  10-14    LIVER FUNCTIONS - ( 14 Oct 2019 06:06 )  Alb: 3.0 g/dL / Pro: 5.9 g/dL / ALK PHOS: 119 U/L / ALT: 30 U/L / AST: 26 U/L / GGT: x                                       8.3    11.37 )-----------( 192      ( 15 Oct 2019 08:38 )             26.3                         8.4    11.37 )-----------( 190      ( 14 Oct 2019 06:06 )             26.3                         9.1    11.60 )-----------( 212      ( 13 Oct 2019 21:32 )             29.5                         8.1    7.75  )-----------( 178      ( 12 Oct 2019 11:03 )             26.1       Imaging: 89 y/o M w/ hx of DM, HTN, CAD s/p PCI on DAPT, PVD, dementia, and recent hospitalization at [facility] for evaluation of hematochezia from 10/6/19 to 10/8/19, presenting with chest pain and found to have NSTEMI, found to be anemic with no overt GI bleeding, however, with drop in Hgb and requiring transfusion; GI consulted for evaluation    Interval Events:   No bloody bm  No abdominal pain.    Hospital Medications:  aspirin enteric coated 81 milliGRAM(s) Oral daily  atorvastatin 80 milliGRAM(s) Oral at bedtime  azithromycin  IVPB 500 milliGRAM(s) IV Intermittent every 24 hours  azithromycin  IVPB      cefTRIAXone   IVPB 1000 milliGRAM(s) IV Intermittent every 24 hours  cholecalciferol 1000 Unit(s) Oral daily  clopidogrel Tablet 75 milliGRAM(s) Oral daily  dextrose 40% Gel 15 Gram(s) Oral once PRN  dextrose 5%. 1000 milliLiter(s) IV Continuous <Continuous>  dextrose 50% Injectable 12.5 Gram(s) IV Push once  dextrose 50% Injectable 25 Gram(s) IV Push once  dextrose 50% Injectable 25 Gram(s) IV Push once  donepezil 10 milliGRAM(s) Oral at bedtime  glucagon  Injectable 1 milliGRAM(s) IntraMuscular once PRN  insulin glargine Injectable (LANTUS) 14 Unit(s) SubCutaneous at bedtime  insulin lispro (HumaLOG) corrective regimen sliding scale   SubCutaneous at bedtime  insulin lispro (HumaLOG) corrective regimen sliding scale   SubCutaneous three times a day before meals  insulin lispro Injectable (HumaLOG) 3 Unit(s) SubCutaneous three times a day before meals  metoprolol tartrate 25 milliGRAM(s) Oral two times a day  ondansetron Injectable 4 milliGRAM(s) IV Push every 8 hours PRN  pantoprazole  Injectable 40 milliGRAM(s) IV Push two times a day  phenytoin   Capsule 300 milliGRAM(s) Oral at bedtime  sodium chloride 0.9%. 1000 milliLiter(s) IV Continuous <Continuous>        ROS:   General:  No fevers, chills or night sweats  ENT:  No sore throat or dysphagia  CV:  No pain or palpitations  Resp:  No dyspnea, cough or  wheezing  GI:  as above  Skin:  No rash or edema  Neuro: no weakness   Hematologic: no bleeding  Musculoskeletal: no muscle pain or join pain  Psych: no agitation      PHYSICAL EXAM:   Vital Signs:  Vital Signs Last 24 Hrs  T(C): 37.5 (15 Oct 2019 05:12), Max: 37.7 (14 Oct 2019 20:53)  T(F): 99.5 (15 Oct 2019 05:12), Max: 99.8 (14 Oct 2019 20:53)  HR: 84 (15 Oct 2019 08:20) (61 - 93)  BP: 117/66 (15 Oct 2019 08:20) (88/48 - 120/63)  BP(mean): --  RR: 18 (15 Oct 2019 05:12) (18 - 20)  SpO2: 91% (15 Oct 2019 05:12) (88% - 97%)  Daily     Daily Weight in k.4 (15 Oct 2019 08:18)    GENERAL:  NAD, Appears stated age  HEENT:  NC/AT,  conjunctivae clear and pink, sclera -anicteric  CHEST:  Normal Effort, Breath sounds clear  HEART:  RRR, S1 + S2, no murmurs  ABDOMEN:  Soft, non-tender, non-distended, normoactive bowel sounds,  no masses  EXTREMITIES:  no cyanosis or edema  SKIN:  Warm & Dry. No rash or erythema  NEURO:  Alert, oriented    LABS:                        8.3    11.37 )-----------( 192      ( 15 Oct 2019 08:38 )             26.3     Mean Cell Volume: 105.6 fl (10-15-19 @ 08:38)    10-15    138  |  103  |  31<H>  ----------------------------<  222<H>  5.2   |  24  |  1.81<H>    Ca    8.3<L>      15 Oct 2019 06:25  Phos  3.3     10-15  Mg     2.0     10-15    TPro  5.9<L>  /  Alb  3.0<L>  /  TBili  0.2  /  DBili  x   /  AST  26  /  ALT  30  /  AlkPhos  119  10-14    LIVER FUNCTIONS - ( 14 Oct 2019 06:06 )  Alb: 3.0 g/dL / Pro: 5.9 g/dL / ALK PHOS: 119 U/L / ALT: 30 U/L / AST: 26 U/L / GGT: x                                       8.3    11.37 )-----------( 192      ( 15 Oct 2019 08:38 )             26.3                         8.4    11.37 )-----------( 190      ( 14 Oct 2019 06:06 )             26.3                         9.1    11.60 )-----------( 212      ( 13 Oct 2019 21:32 )             29.5                         8.1    7.75  )-----------( 178      ( 12 Oct 2019 11:03 )             26.1       Imaging:

## 2019-10-15 NOTE — PROVIDER CONTACT NOTE (CRITICAL VALUE NOTIFICATION) - ACTION/TREATMENT ORDERED:
continue to monitor
Pt currently on IV Azithromycin & IV Ceftriaxone as per order. MD Castellanos aware. Will continue to monitor.

## 2019-10-15 NOTE — PROGRESS NOTE ADULT - SUBJECTIVE AND OBJECTIVE BOX
Contact Information:  Sam Carias II, MD, MPH  PGY-1, Internal Medicine  Pager: [phone] ([facility]) /// [phone] ([facility])    HOME ADLER, MRN-[id number]    Patient is a 89y old  Male who presents with a chief complaint of NSTEMI (14 Oct 2019 16:26)      OVERNIGHT EVENTS:     SUBJECTIVE:    CONSTITUTIONAL: No weakness. No fatigue. No fever.  HEAD: No head trauma.   EYES: No vision changes.  ENT: No hearing changes or tinnitus. No ear pain. No changes in smell. No nasal congestion or discharge. No sore throat. No voice hoarseness.   NECK: No neck pain or stiffness. No lumps.  RESPIRATORY: No cough. No SOB. No wheezing. No hemoptysis.   CARDIOVASCULAR: No chest pain. No palpitations.   GASTROINTESTINAL: No dysphagia. No ABD pain. No distension. No constipation. No diarrhea. No pain with defecation. No hematemesis. No hematochezia or melena.  BACK: No back pain.  GENITOURINARY: No dysuria. No frequency or urgency. No hesitancy. No incontinence. No urinary retention. No suprapubic pain. No hematuria.  EXTREMITY: No swelling.  MUSCULOSKELETAL: No joint pain or swelling. No fractures. No stiffness.    SKIN: No rashes. No itching. No skin, hair, or nail changes.  NEUROLOGICAL: No weakness or paralysis. No lightheadedness or dizziness. No HA. No numbness or tingling.   PSYCHIATRIC: No depression.       OBJECTIVE:  Vital Signs Last 24 Hrs  T(C): 37.5 (15 Oct 2019 05:12), Max: 37.7 (14 Oct 2019 20:53)  T(F): 99.5 (15 Oct 2019 05:12), Max: 99.8 (14 Oct 2019 20:53)  HR: 93 (15 Oct 2019 05:12) (80 - 93)  BP: 115/63 (15 Oct 2019 05:12) (103/58 - 120/63)  BP(mean): --  RR: 18 (15 Oct 2019 05:12) (18 - 20)  SpO2: 91% (15 Oct 2019 05:12) (88% - 97%)  I&O's Summary    13 Oct 2019 07:01  -  14 Oct 2019 07:00  --------------------------------------------------------  IN: 720 mL / OUT: 800 mL / NET: -80 mL    14 Oct 2019 07:01  -  15 Oct 2019 06:42  --------------------------------------------------------  IN: 1240 mL / OUT: 675 mL / NET: 565 mL        MEDICATIONS  (STANDING):  aspirin enteric coated 81 milliGRAM(s) Oral daily  atorvastatin 80 milliGRAM(s) Oral at bedtime  azithromycin  IVPB 500 milliGRAM(s) IV Intermittent every 24 hours  azithromycin  IVPB      cefTRIAXone   IVPB 1000 milliGRAM(s) IV Intermittent every 24 hours  cholecalciferol 1000 Unit(s) Oral daily  clopidogrel Tablet 75 milliGRAM(s) Oral daily  dextrose 5%. 1000 milliLiter(s) (50 mL/Hr) IV Continuous <Continuous>  dextrose 50% Injectable 12.5 Gram(s) IV Push once  dextrose 50% Injectable 25 Gram(s) IV Push once  dextrose 50% Injectable 25 Gram(s) IV Push once  donepezil 10 milliGRAM(s) Oral at bedtime  insulin glargine Injectable (LANTUS) 14 Unit(s) SubCutaneous at bedtime  insulin lispro (HumaLOG) corrective regimen sliding scale   SubCutaneous at bedtime  insulin lispro (HumaLOG) corrective regimen sliding scale   SubCutaneous three times a day before meals  insulin lispro Injectable (HumaLOG) 3 Unit(s) SubCutaneous three times a day before meals  metoprolol tartrate 25 milliGRAM(s) Oral two times a day  pantoprazole  Injectable 40 milliGRAM(s) IV Push two times a day  phenytoin   Capsule 300 milliGRAM(s) Oral at bedtime    MEDICATIONS  (PRN):  dextrose 40% Gel 15 Gram(s) Oral once PRN Blood Glucose LESS THAN 70 milliGRAM(s)/deciliter  glucagon  Injectable 1 milliGRAM(s) IntraMuscular once PRN Glucose LESS THAN 70 milligrams/deciliter    Allergies    No Known Allergies    Intolerances        CONSTITUTIONAL: No acute distress. Awake and alert.  HEAD: No evidence of trauma. Structures WNL.  EYES: +PERRL. +EOMI. No scleral icterus. No conjunctival injection.  ENT: Hearing intact b/l. Moist oral mucosa. No abnormal lesions. No erythema. No pharyngeal exudates.   NECK: Supple. Appropriate ROM. No stiffness. No masses or lymphadenopathy.  RESPIRATORY: CTAB. No wheezes, rales, or rhonchi. No accessory muscle use. No apparent respiratory distress.  CARDIOVASCULAR: +S1/S2. No audible S3/S4. Regular rate and rhythm. No murmurs, rubs, or gallops.  GASTROINTESTINAL: Soft, nontender, nondistended. +BS. No rebound or guarding.   BACK: No spinal or paraspinal tenderness. No CVA tenderness.  GENITOURINARY: Normal appearing genitalia.  VASCULAR: 2+ radial pulses x b/l UE; 2+ DP pulses x b/l LE.  EXTREMITY: No LE swelling or edema. EXTs warm to touch.  MUSCULOSKELETAL: 5/5 strength x 4EXT. Movement evident in all limbs. No tenderness on palpation.  DERMATOLOGICAL: No abnormal rashes or lesions. No evident hair, skin, or nail changes.  NEUROLOGICAL: CN 2-12 grossly intact. No focal deficits. Sensation intact x 4EXT. A&Ox3 (oriented to person, place, and time).  PSYCHIATRIC: Appropriate affect.                            8.4    11.37 )-----------( 190      ( 14 Oct 2019 06:06 )             26.3       10-14    137  |  104  |  36<H>  ----------------------------<  155<H>  4.7   |  26  |  1.94<H>    Ca    8.2<L>      14 Oct 2019 06:06  Phos  3.9     10-14  Mg     2.2     10-14    TPro  5.9<L>  /  Alb  3.0<L>  /  TBili  0.2  /  DBili  x   /  AST  26  /  ALT  30  /  AlkPhos  119  10-14    CAPILLARY BLOOD GLUCOSE      POCT Blood Glucose.: 281 mg/dL (14 Oct 2019 21:54)  POCT Blood Glucose.: 340 mg/dL (14 Oct 2019 17:09)  POCT Blood Glucose.: 361 mg/dL (14 Oct 2019 12:14)  POCT Blood Glucose.: 152 mg/dL (14 Oct 2019 07:59)    LIVER FUNCTIONS - ( 14 Oct 2019 06:06 )  Alb: 3.0 g/dL / Pro: 5.9 g/dL / ALK PHOS: 119 U/L / ALT: 30 U/L / AST: 26 U/L / GGT: x                   Culture - Blood (collected 14 Oct 2019 05:23)  Source: .Blood  Gram Stain (15 Oct 2019 06:04):    Growth in aerobic bottle: Gram Negative Coccobacilli  Preliminary Report (15 Oct 2019 06:05):    Growth in aerobic bottle: Gram Negative Coccobacilli    "Due to technical problems, Proteus sp. will Not be reported as part of    the BCID panel until further notice"    ***Blood Panel PCR results on this specimen are available    approximately 3 hours after the Gram stain result.***    Gram stain, PCR, and/or culture results may not always    correspond due to difference in methodologies.    ************************************************************    This PCR assay was performed using [employer].    The following targets are tested for: Enterococcus,    vancomycin resistant enterococci, Listeria monocytogenes,    coagulase negative staphylococci, S. aureus,    methicillin resistant S. aureus, Streptococcus agalactiae    (Group B), S. pneumoniae, S. pyogenes (Group A),    Acinetobacter baumannii, Enterobacter cloacae, E. coli,    Klebsiella oxytoca, K. pneumoniae, Proteus sp.,    Serratia marcescens, Haemophilus influenzae,    Neisseria meningitidis, Pseudomonas aeruginosa, Candida    albicans, C. glabrata, C krusei, C parapsilosis,    C. tropicalis and the KPC resistance gene.  Organism: Blood Culture PCR (15 Oct 2019 06:18)  Organism: Blood Culture PCR (15 Oct 2019 06:18)    Culture - Blood (collected 14 Oct 2019 05:23)  Source: .Blood  Preliminary Report (15 Oct 2019 06:01):    No growth to date.          RADIOLOGY AND ADDITIONAL TESTS:    CONSULTANT NOTES REVIEWED:    CARE DISCUSSED WITH THE FOLLOWING CONSULTANTS/PROVIDERS: Contact Information:  Sam Carias II, MD, MPH  PGY-1, Internal Medicine  Pager: [phone] ([facility]) /// [phone] ([facility])    HOME ADLER, MRN-[id number]    Patient is a 89y old  Male who presents with a chief complaint of NSTEMI (14 Oct 2019 16:26)      OVERNIGHT EVENTS: No overnight events.    SUBJECTIVE: This AM, patient c/o dizziness, nausea, very slight emesis, and body shaking/spasms. Given zofran, gentle hydration.    CONSTITUTIONAL: No weakness. No fatigue. No fever.  HEAD: No head trauma.   EYES: No vision changes.  ENT: No hearing changes or tinnitus. No ear pain. No changes in smell. No nasal congestion or discharge. No sore throat. No voice hoarseness.   NECK: No neck pain or stiffness. No lumps.  RESPIRATORY: No cough. No SOB. No wheezing. No hemoptysis.   CARDIOVASCULAR: No chest pain. No palpitations.   GASTROINTESTINAL: No dysphagia. No ABD pain. No distension. No constipation. No diarrhea. No pain with defecation. No hematemesis. No hematochezia or melena. +Nausea.  BACK: No back pain.  GENITOURINARY: No dysuria. No frequency or urgency. No hesitancy. No incontinence. No urinary retention. No suprapubic pain. No hematuria.  EXTREMITY: No swelling.  MUSCULOSKELETAL: No joint pain or swelling. No fractures. No stiffness. +Body shaking.  SKIN: No rashes. No itching. No skin, hair, or nail changes.  NEUROLOGICAL: +Dizziness. No weakness or paralysis. No HA. No numbness or tingling.   PSYCHIATRIC: No depression.       OBJECTIVE:  Vital Signs Last 24 Hrs  T(C): 37.5 (15 Oct 2019 05:12), Max: 37.7 (14 Oct 2019 20:53)  T(F): 99.5 (15 Oct 2019 05:12), Max: 99.8 (14 Oct 2019 20:53)  HR: 93 (15 Oct 2019 05:12) (80 - 93)  BP: 115/63 (15 Oct 2019 05:12) (103/58 - 120/63)  BP(mean): --  RR: 18 (15 Oct 2019 05:12) (18 - 20)  SpO2: 91% (15 Oct 2019 05:12) (88% - 97%)  I&O's Summary    13 Oct 2019 07:01  -  14 Oct 2019 07:00  --------------------------------------------------------  IN: 720 mL / OUT: 800 mL / NET: -80 mL    14 Oct 2019 07:01  -  15 Oct 2019 06:42  --------------------------------------------------------  IN: 1240 mL / OUT: 675 mL / NET: 565 mL        MEDICATIONS  (STANDING):  aspirin enteric coated 81 milliGRAM(s) Oral daily  atorvastatin 80 milliGRAM(s) Oral at bedtime  azithromycin  IVPB 500 milliGRAM(s) IV Intermittent every 24 hours  azithromycin  IVPB      cefTRIAXone   IVPB 1000 milliGRAM(s) IV Intermittent every 24 hours  cholecalciferol 1000 Unit(s) Oral daily  clopidogrel Tablet 75 milliGRAM(s) Oral daily  dextrose 5%. 1000 milliLiter(s) (50 mL/Hr) IV Continuous <Continuous>  dextrose 50% Injectable 12.5 Gram(s) IV Push once  dextrose 50% Injectable 25 Gram(s) IV Push once  dextrose 50% Injectable 25 Gram(s) IV Push once  donepezil 10 milliGRAM(s) Oral at bedtime  insulin glargine Injectable (LANTUS) 14 Unit(s) SubCutaneous at bedtime  insulin lispro (HumaLOG) corrective regimen sliding scale   SubCutaneous at bedtime  insulin lispro (HumaLOG) corrective regimen sliding scale   SubCutaneous three times a day before meals  insulin lispro Injectable (HumaLOG) 3 Unit(s) SubCutaneous three times a day before meals  metoprolol tartrate 25 milliGRAM(s) Oral two times a day  pantoprazole  Injectable 40 milliGRAM(s) IV Push two times a day  phenytoin   Capsule 300 milliGRAM(s) Oral at bedtime    MEDICATIONS  (PRN):  dextrose 40% Gel 15 Gram(s) Oral once PRN Blood Glucose LESS THAN 70 milliGRAM(s)/deciliter  glucagon  Injectable 1 milliGRAM(s) IntraMuscular once PRN Glucose LESS THAN 70 milligrams/deciliter    Allergies    No Known Allergies    Intolerances        CONSTITUTIONAL: No acute distress. Awake and alert.  HEAD: No evidence of trauma. Structures WNL.  EYES: +PERRL. +EOMI. No scleral icterus. No conjunctival injection.  ENT: Dry tongue. No abnormal lesions. No erythema.  NECK: Supple. .  RESPIRATORY: Decreased lung sounds on R side compared to L side. Adequate airflow on L side.  CARDIOVASCULAR: +S1/S2. No audible S3/S4. Regular rate and rhythm. No murmurs, rubs, or gallops.  GASTROINTESTINAL: Soft, nontender, nondistended. +BS. No rebound or guarding.   BACK: No spinal or paraspinal tenderness. No CVA tenderness.  VASCULAR: Distal pulses present throughout  EXTREMITY: No LE swelling or edema. EXTs warm to touch.  MUSCULOSKELETAL: Movement x 4EXT  DERMATOLOGICAL: No abnormal rashes or lesions.   NEUROLOGICAL: CN 2-12 grossly intact. No focal deficits. Sensation intact x 4EXT. A&Ox3 (oriented to person, place, and time).                            8.4    11.37 )-----------( 190      ( 14 Oct 2019 06:06 )             26.3       10-14    137  |  104  |  36<H>  ----------------------------<  155<H>  4.7   |  26  |  1.94<H>    Ca    8.2<L>      14 Oct 2019 06:06  Phos  3.9     10-14  Mg     2.2     10-14    TPro  5.9<L>  /  Alb  3.0<L>  /  TBili  0.2  /  DBili  x   /  AST  26  /  ALT  30  /  AlkPhos  119  10-14    CAPILLARY BLOOD GLUCOSE      POCT Blood Glucose.: 281 mg/dL (14 Oct 2019 21:54)  POCT Blood Glucose.: 340 mg/dL (14 Oct 2019 17:09)  POCT Blood Glucose.: 361 mg/dL (14 Oct 2019 12:14)  POCT Blood Glucose.: 152 mg/dL (14 Oct 2019 07:59)    LIVER FUNCTIONS - ( 14 Oct 2019 06:06 )  Alb: 3.0 g/dL / Pro: 5.9 g/dL / ALK PHOS: 119 U/L / ALT: 30 U/L / AST: 26 U/L / GGT: x                   Culture - Blood (collected 14 Oct 2019 05:23)  Source: .Blood  Gram Stain (15 Oct 2019 06:04):    Growth in aerobic bottle: Gram Negative Coccobacilli  Preliminary Report (15 Oct 2019 06:05):    Growth in aerobic bottle: Gram Negative Coccobacilli    "Due to technical problems, Proteus sp. will Not be reported as part of    the BCID panel until further notice"    ***Blood Panel PCR results on this specimen are available    approximately 3 hours after the Gram stain result.***    Gram stain, PCR, and/or culture results may not always    correspond due to difference in methodologies.    ************************************************************    This PCR assay was performed using [employer].    The following targets are tested for: Enterococcus,    vancomycin resistant enterococci, Listeria monocytogenes,    coagulase negative staphylococci, S. aureus,    methicillin resistant S. aureus, Streptococcus agalactiae    (Group B), S. pneumoniae, S. pyogenes (Group A),    Acinetobacter baumannii, Enterobacter cloacae, E. coli,    Klebsiella oxytoca, K. pneumoniae, Proteus sp.,    Serratia marcescens, Haemophilus influenzae,    Neisseria meningitidis, Pseudomonas aeruginosa, Candida    albicans, C. glabrata, C krusei, C parapsilosis,    C. tropicalis and the KPC resistance gene.  Organism: Blood Culture PCR (15 Oct 2019 06:18)  Organism: Blood Culture PCR (15 Oct 2019 06:18)    Culture - Blood (collected 14 Oct 2019 05:23)  Source: .Blood  Preliminary Report (15 Oct 2019 06:01):    No growth to date.          RADIOLOGY AND ADDITIONAL TESTS:    CONSULTANT NOTES REVIEWED:    CARE DISCUSSED WITH THE FOLLOWING CONSULTANTS/PROVIDERS: Contact Information:  Sam Carias II, MD, MPH  PGY-1, Internal Medicine  Pager: [phone] ([facility]) /// [phone] ([facility])    HOME ADLER, MRN-[id number]    Patient is a 89y old  Male who presents with a chief complaint of NSTEMI (14 Oct 2019 16:26)    OVERNIGHT EVENTS: No overnight events.    SUBJECTIVE: This AM, patient c/o dizziness, nausea, very slight emesis, and body shaking/spasms. Given zofran, gentle hydration.  Denies dyspnea.     CONSTITUTIONAL: No weakness. No fatigue. No fever.  HEAD: No head trauma.   EYES: No vision changes.  ENT: No hearing changes or tinnitus. No ear pain. No changes in smell. No nasal congestion or discharge. No sore throat. No voice hoarseness.   NECK: No neck pain or stiffness. No lumps.  RESPIRATORY: No cough. No SOB. No wheezing. No hemoptysis.   CARDIOVASCULAR: No chest pain. No palpitations.   GASTROINTESTINAL: No dysphagia. No ABD pain. No distension. No constipation. No diarrhea. No pain with defecation. No hematemesis. No hematochezia or melena. +Nausea.  BACK: No back pain.  GENITOURINARY: No dysuria. No frequency or urgency. No hesitancy. No incontinence. No urinary retention. No suprapubic pain. No hematuria.  EXTREMITY: No swelling.  MUSCULOSKELETAL: No joint pain or swelling. No fractures. No stiffness. +Body shaking.  SKIN: No rashes. No itching. No skin, hair, or nail changes.  NEUROLOGICAL: +Dizziness. No weakness or paralysis. No HA. No numbness or tingling.   PSYCHIATRIC: No depression.       OBJECTIVE:  Vital Signs Last 24 Hrs  T(C): 37.5 (15 Oct 2019 05:12), Max: 37.7 (14 Oct 2019 20:53)  T(F): 99.5 (15 Oct 2019 05:12), Max: 99.8 (14 Oct 2019 20:53)  HR: 93 (15 Oct 2019 05:12) (80 - 93)  BP: 115/63 (15 Oct 2019 05:12) (103/58 - 120/63)  BP(mean): --  RR: 18 (15 Oct 2019 05:12) (18 - 20)  SpO2: 91% (15 Oct 2019 05:12) (88% - 97%)  I&O's Summary    13 Oct 2019 07:01  -  14 Oct 2019 07:00  --------------------------------------------------------  IN: 720 mL / OUT: 800 mL / NET: -80 mL    14 Oct 2019 07:01  -  15 Oct 2019 06:42  --------------------------------------------------------  IN: 1240 mL / OUT: 675 mL / NET: 565 mL        MEDICATIONS  (STANDING):  aspirin enteric coated 81 milliGRAM(s) Oral daily  atorvastatin 80 milliGRAM(s) Oral at bedtime  azithromycin  IVPB 500 milliGRAM(s) IV Intermittent every 24 hours  azithromycin  IVPB      cefTRIAXone   IVPB 1000 milliGRAM(s) IV Intermittent every 24 hours  cholecalciferol 1000 Unit(s) Oral daily  clopidogrel Tablet 75 milliGRAM(s) Oral daily  dextrose 5%. 1000 milliLiter(s) (50 mL/Hr) IV Continuous <Continuous>  dextrose 50% Injectable 12.5 Gram(s) IV Push once  dextrose 50% Injectable 25 Gram(s) IV Push once  dextrose 50% Injectable 25 Gram(s) IV Push once  donepezil 10 milliGRAM(s) Oral at bedtime  insulin glargine Injectable (LANTUS) 14 Unit(s) SubCutaneous at bedtime  insulin lispro (HumaLOG) corrective regimen sliding scale   SubCutaneous at bedtime  insulin lispro (HumaLOG) corrective regimen sliding scale   SubCutaneous three times a day before meals  insulin lispro Injectable (HumaLOG) 3 Unit(s) SubCutaneous three times a day before meals  metoprolol tartrate 25 milliGRAM(s) Oral two times a day  pantoprazole  Injectable 40 milliGRAM(s) IV Push two times a day  phenytoin   Capsule 300 milliGRAM(s) Oral at bedtime    MEDICATIONS  (PRN):  dextrose 40% Gel 15 Gram(s) Oral once PRN Blood Glucose LESS THAN 70 milliGRAM(s)/deciliter  glucagon  Injectable 1 milliGRAM(s) IntraMuscular once PRN Glucose LESS THAN 70 milligrams/deciliter    Allergies    No Known Allergies    Intolerances        CONSTITUTIONAL: No acute distress. Awake and alert.  HEAD: No evidence of trauma. Structures WNL.  EYES: +PERRL. +EOMI. No scleral icterus. No conjunctival injection.  ENT: Dry tongue. No abnormal lesions. No erythema.  NECK: Supple. .  RESPIRATORY: Decreased lung sounds on R side compared to L side. Adequate airflow on L side. Dec bs at bases with crackles. NO wheezing or rhonchi. No accessory muscle use.   CARDIOVASCULAR: +S1/S2. No audible S3/S4. Regular rate and rhythm. No murmurs, rubs, or gallops. Extremities warm and well perfused. 1+ LE edema b/l.   GASTROINTESTINAL: Soft, nontender, nondistended. +BS. No rebound or guarding.   BACK: No spinal or paraspinal tenderness. No CVA tenderness.  VASCULAR: Distal pulses present throughout  MUSCULOSKELETAL: Movement x 4EXT  DERMATOLOGICAL: No abnormal rashes or lesions.   NEUROLOGICAL: CN 2-12 grossly intact. No focal deficits. Sensation intact x 4EXT. A&Ox3 (oriented to person, place, and time).                            8.4    11.37 )-----------( 190      ( 14 Oct 2019 06:06 )             26.3       10-14    137  |  104  |  36<H>  ----------------------------<  155<H>  4.7   |  26  |  1.94<H>    Ca    8.2<L>      14 Oct 2019 06:06  Phos  3.9     10-14  Mg     2.2     10-14    TPro  5.9<L>  /  Alb  3.0<L>  /  TBili  0.2  /  DBili  x   /  AST  26  /  ALT  30  /  AlkPhos  119  10-14    CAPILLARY BLOOD GLUCOSE      POCT Blood Glucose.: 281 mg/dL (14 Oct 2019 21:54)  POCT Blood Glucose.: 340 mg/dL (14 Oct 2019 17:09)  POCT Blood Glucose.: 361 mg/dL (14 Oct 2019 12:14)  POCT Blood Glucose.: 152 mg/dL (14 Oct 2019 07:59)    LIVER FUNCTIONS - ( 14 Oct 2019 06:06 )  Alb: 3.0 g/dL / Pro: 5.9 g/dL / ALK PHOS: 119 U/L / ALT: 30 U/L / AST: 26 U/L / GGT: x                   Culture - Blood (collected 14 Oct 2019 05:23)  Source: .Blood  Gram Stain (15 Oct 2019 06:04):    Growth in aerobic bottle: Gram Negative Coccobacilli  Preliminary Report (15 Oct 2019 06:05):    Growth in aerobic bottle: Gram Negative Coccobacilli    "Due to technical problems, Proteus sp. will Not be reported as part of    the BCID panel until further notice"    ***Blood Panel PCR results on this specimen are available    approximately 3 hours after the Gram stain result.***    Gram stain, PCR, and/or culture results may not always    correspond due to difference in methodologies.    ************************************************************    This PCR assay was performed using [employer].    The following targets are tested for: Enterococcus,    vancomycin resistant enterococci, Listeria monocytogenes,    coagulase negative staphylococci, S. aureus,    methicillin resistant S. aureus, Streptococcus agalactiae    (Group B), S. pneumoniae, S. pyogenes (Group A),    Acinetobacter baumannii, Enterobacter cloacae, E. coli,    Klebsiella oxytoca, K. pneumoniae, Proteus sp.,    Serratia marcescens, Haemophilus influenzae,    Neisseria meningitidis, Pseudomonas aeruginosa, Candida    albicans, C. glabrata, C krusei, C parapsilosis,    C. tropicalis and the KPC resistance gene.  Organism: Blood Culture PCR (15 Oct 2019 06:18)  Organism: Blood Culture PCR (15 Oct 2019 06:18)    Culture - Blood (collected 14 Oct 2019 05:23)  Source: .Blood  Preliminary Report (15 Oct 2019 06:01):    No growth to date.          RADIOLOGY AND ADDITIONAL TESTS:    CONSULTANT NOTES REVIEWED:    CARE DISCUSSED WITH THE FOLLOWING CONSULTANTS/PROVIDERS:

## 2019-10-15 NOTE — PROGRESS NOTE ADULT - PROBLEM SELECTOR PLAN 5
- CAD s/p stents, remote hx of MI  - ASA held last admission 2/2 GIB  - Presents with NSTEMI  - c/w ASA, Plavix  - Atorvastatin 80mg QHS  - Metoprolol tartrate 25mg PO BID - CAD s/p stents, remote hx of MI  - ASA held last admission 2/2 GIB  - Presents with NSTEMI  - c/w ASA, Plavix  - Atorvastatin 80mg QHS  - Start toprol 50 - Recently admitted for workup of melena concerning for UGIB; ASA held, plavix continued. Hgb stable so no intervention. Reports dark stools  - Received s/p 1U PRBC in ED  - GI consulted - no plan for endoscopy  - CBC Q12 for now, VSQ4hr  - Transfuse prn Hgb<8  - Protonix IV 40mg BID

## 2019-10-15 NOTE — PROGRESS NOTE ADULT - PROBLEM SELECTOR PLAN 1
- Patient febrile to 101.8 + WBC elevated  - Ddx: Infectious vs DVT; patient reports cough, unable to expectorate mucus; also reports LLE tenderness   - Bcx + for H. influenzae   - F/u UA, Ucx  - Previous CXR demonstrates L base atelectasis  - B/l LE Duplex to r/o DVT  - F/u urine legionella  - Continue Rx for CAP, will broaden based on clinical response  - Incentive spirometry, chest PT as necessary - Patient febrile to 101.8 + WBC elevated  - Ddx: Infectious vs DVT; patient reports cough, unable to expectorate mucus; also reports LLE tenderness   - Bcx + for H. influenzae, ID consulted  - F/u UA, Ucx  - Previous CXR demonstrates L base atelectasis  - B/l LE Duplex to r/o DVT  - Urine legionella negative  - Continue Rx for CAP, will broaden based on clinical response  - Incentive spirometry, chest PT as necessary with bacteremia  -c/w CTX 2g ivpb   -d/c azithro but still f/u urine legionella.  -f/u repeat bcx  -id input appreciated.

## 2019-10-15 NOTE — PROGRESS NOTE ADULT - PROBLEM SELECTOR PLAN 7
- Home dependent Lantus 22U QHS, 10U premeal  - Most recent admission was Lantus 14U QHS, and 6U premeal  - Continue with Lantus 14U QHS  - ISS - Home dependent Lantus 22U QHS, 10U premeal  - Most recent admission was Lantus 14U QHS, and 6U premeal  - Continue with Lantus 14U QHS  - Increase Humalog to 6U premeal - CKD without UDAY creatinine at baseline

## 2019-10-15 NOTE — PROGRESS NOTE ADULT - PROBLEM SELECTOR PLAN 6
- CKD without UDAY creatinine at baseline - CAD s/p stents, remote hx of MI  - ASA held last admission 2/2 GIB  - Presents with NSTEMI  - c/w ASA, Plavix  - Atorvastatin 80mg QHS  - Start toprol 50

## 2019-10-15 NOTE — PROGRESS NOTE ADULT - ASSESSMENT
# Anemia- acute on chronic likely secondary to CKD: Currently with no overt bleeding, HD stable, and with Hgb of 8 from 7 after receiving 1 unit pRBCs. Likely represents continued anemia following recent hematochezia. May represent occult blood loss from erosive gastropathy or angioectasias while on DAPT.   # Constipation   # CAD s/p PCI on DAPT  # NSTEMI  # UDAY  # PVD  # DM  # HTN  # Dementia    Recommendations:  - No plans for urgent endoscopic evaluation in setting of NSTEMI, PNA, UDAY and stable h&h with no signs of overt GI bleed.  - Start Miralax and colace for constipation.  - Monitor CBCs daily  - Monitor bowel movements  - Transfuse for goal Hgb >/= 8.0  - Can continue PO PPI BID for prophylaxis   - Rest of care per primary team  - Call us back with questions

## 2019-10-15 NOTE — PROVIDER CONTACT NOTE (CRITICAL VALUE NOTIFICATION) - RECOMMENDATIONS
Pt currently on IV Azithromycin & IV Ceftriaxone as per order. MD Castellanos aware. Will continue to monitor.

## 2019-10-15 NOTE — PROGRESS NOTE ADULT - SUBJECTIVE AND OBJECTIVE BOX
Subjective: Patient seen and examined. No new events except as noted.   difficult geetin up today ?sob cough no cp dizziness  febrile source unclear possibly pneumonia  presently no cp or sob  creatinine 1.8 stable VS  MEDICATIONS:  MEDICATIONS  (STANDING):  aspirin enteric coated 81 milliGRAM(s) Oral daily  atorvastatin 80 milliGRAM(s) Oral at bedtime  azithromycin  IVPB 500 milliGRAM(s) IV Intermittent every 24 hours  azithromycin  IVPB      cefTRIAXone   IVPB 2000 milliGRAM(s) IV Intermittent every 24 hours  cholecalciferol 1000 Unit(s) Oral daily  clopidogrel Tablet 75 milliGRAM(s) Oral daily  dextrose 5%. 1000 milliLiter(s) (50 mL/Hr) IV Continuous <Continuous>  dextrose 50% Injectable 12.5 Gram(s) IV Push once  dextrose 50% Injectable 25 Gram(s) IV Push once  dextrose 50% Injectable 25 Gram(s) IV Push once  donepezil 10 milliGRAM(s) Oral at bedtime  insulin glargine Injectable (LANTUS) 14 Unit(s) SubCutaneous at bedtime  insulin lispro (HumaLOG) corrective regimen sliding scale   SubCutaneous at bedtime  insulin lispro (HumaLOG) corrective regimen sliding scale   SubCutaneous three times a day before meals  insulin lispro Injectable (HumaLOG) 3 Unit(s) SubCutaneous three times a day before meals  metoprolol tartrate 25 milliGRAM(s) Oral two times a day  pantoprazole  Injectable 40 milliGRAM(s) IV Push two times a day  phenytoin   Capsule 300 milliGRAM(s) Oral at bedtime  sodium chloride 0.9%. 1000 milliLiter(s) (50 mL/Hr) IV Continuous <Continuous>      PHYSICAL EXAM:  T(C): 37.5 (10-15-19 @ 05:12), Max: 37.7 (10-14-19 @ 20:53)  HR: 84 (10-15-19 @ 08:20) (61 - 93)  BP: 117/66 (10-15-19 @ 08:20) (88/48 - 120/63)  RR: 18 (10-15-19 @ 05:12) (18 - 20)  SpO2: 91% (10-15-19 @ 05:12) (88% - 97%)  Wt(kg): --  I&O's Summary    14 Oct 2019 07:01  -  15 Oct 2019 07:00  --------------------------------------------------------  IN: 1240 mL / OUT: 675 mL / NET: 565 mL    15 Oct 2019 07:01  -  15 Oct 2019 12:36  --------------------------------------------------------  IN: 200 mL / OUT: 0 mL / NET: 200 mL          Appearance: Normal slightly confused but awake and alert on nasal O2	  HEENT:   Normal oral mucosa, PERRL, EOMI	  Cardiovascular: Normal S1 S2, No JVD, No murmurs ,  Respiratory: Lungs rales at right base couase	  Gastrointestinal:  Soft, Non-tender, + BS	  Skin: No rashes, No ecchymoses, No cyanosis, warm to touch  Musculoskeletal: Normal range of motion, normal strength  Psychiatry:  Mood & affect appropriate  Ext: ttrace edema cool extremities  Peripheral pulses palpable 2+ bilaterally      LABS:    CARDIAC MARKERS:                                8.3    11.37 )-----------( 192      ( 15 Oct 2019 08:38 )             26.3     10-15    138  |  103  |  31<H>  ----------------------------<  222<H>  5.2   |  24  |  1.81<H>    Ca    8.3<L>      15 Oct 2019 06:25  Phos  3.3     10-15  Mg     2.0     10-15    TPro  5.9<L>  /  Alb  3.0<L>  /  TBili  0.2  /  DBili  x   /  AST  26  /  ALT  30  /  AlkPhos  119  10-14    proBNP:   Lipid Profile:   HgA1c:   TSH:           TELEMETRY: 	    ECG:  	  RADIOLOGY:   DIAGNOSTIC TESTING:  [ ] Echocardiogram:  [ ]  Catheterization:  [ ] Stress Test:    OTHER:

## 2019-10-15 NOTE — CONSULT NOTE ADULT - SUBJECTIVE AND OBJECTIVE BOX
Patient is a 89y old  Male who presents with a chief complaint of NSTEMI (15 Oct 2019 12:36)    HPI:  89 yo M PMHx CAD s/p stents, CEA, PVD s/p stents, dementia, GI bleed presents sent in by Cardiologist for management of chest pain and EKG changes. Presented to his cardiologist with complaints of acute on chronic chest pain for one month and dark stools. He describes the chest pain as a bandlike squeezing pain across his chest with radiation down his left arm. EKG was performed at the cardiologist's office significant for JACQUELINE in the anterior leads and STD in the precordial leads. EMS was called due to concern for NSTEMI. ASA 81 administered in ED. Chest pain resolved after arrival to the ED. Currently denying any chest pain. Denies pleurisy, SOB, leg swelling, palpitation, or orthopnea.     Of note recently discharged on 10/9, for workup of melena, and chest pain. Chest pain was thought to be of stable angina. Patient was on  and plavix, H/H remained stable at baseline, no GI intervention. ASA was discontinued upon discharge.       In the ED  VS: Tmax 98.1F, HR 79-93, -150/64-75, RR 15-20, spO2 %  Received: 1 unit PRBC, ASA 81 (11 Oct 2019 23:36)     HPI and Hospital Course reviewed. 89 y/o M CAD s/p stents, PVD s/p Stents, CEA, recent admission for GI bleed resolved without intervention, discharged on 10/9, was sent in by outpatient cardiologist for NSTEMI. During hospitalization on 10/13 pt spiked fever to 101.8 with new leukocytosis at 11.60. CXR read as bibasilar atelectasis, pt was started on CTX/Azithromycin for possible CAP. Bcx drawn at the time returned positive for H flu. RVP and urine legionella negative, pt has defervesced with stable WBC count. He admits to dry cough which started on his last admission. Denies fever, chills, HA, photophobia, frequency, dysuria, diarrhea, rashes, recent sick contacts or travel, or antibiotics allergies. ID consulted for further work up.    prior hospital charts reviewed [ x ]  primary team notes reviewed [ x ]  other consultant notes reviewed [ x ]  PAST MEDICAL & SURGICAL HISTORY:  Seizure  OM (osteomyelitis)  PVD (peripheral vascular disease)  CAD (Coronary Artery Disease)  HTN (Hypertension)  HTN (Hypertension)  DM (Diabetes Mellitus)  S/P peripheral artery angioplasty with stent placement: left superficial femoral artery  History of total hip arthroplasty  S/P carotid endarterectomy    Allergies  No Known Allergies    ANTIMICROBIALS (past 90 days)  MEDICATIONS  (STANDING):  azithromycin  IVPB   250 mL/Hr IV Intermittent (10-14-19 @ 18:04)    cefTRIAXone   IVPB   100 mL/Hr IV Intermittent (10-14-19 @ 18:04)      ANTIMICROBIALS:    azithromycin  IVPB 500 every 24 hours  azithromycin  IVPB    cefTRIAXone   IVPB 2000 every 24 hours    OTHER MEDS: MEDICATIONS  (STANDING):  aspirin enteric coated 81 daily  atorvastatin 80 at bedtime  clopidogrel Tablet 75 daily  dextrose 40% Gel 15 once PRN  dextrose 50% Injectable 12.5 once  dextrose 50% Injectable 25 once  dextrose 50% Injectable 25 once  donepezil 10 at bedtime  glucagon  Injectable 1 once PRN  insulin glargine Injectable (LANTUS) 14 at bedtime  insulin lispro (HumaLOG) corrective regimen sliding scale  at bedtime  insulin lispro (HumaLOG) corrective regimen sliding scale  three times a day before meals  insulin lispro Injectable (HumaLOG) 3 three times a day before meals  metoprolol tartrate 25 two times a day  ondansetron Injectable 4 every 8 hours PRN  pantoprazole  Injectable 40 two times a day  phenytoin   Capsule 300 at bedtime    SOCIAL HISTORY:  former 3ppd smoker, quit 40 years ago, no alcohol or illicit drug use    FAMILY HISTORY:  No pertinent family history in first degree relatives    REVIEW OF SYSTEMS  [  ] ROS unobtainable because:    [ x ] All other systems negative except as noted below:	    Constitutional:  [ ] fever [ ] chills  [ ] weight loss  [ ] weakness  Skin:  [ ] rash [ ] phlebitis	  Eyes: [ ] icterus [ ] pain  [ ] discharge	  ENMT: [ ] sore throat  [ ] thrush [ ] ulcers [ ] exudates  Respiratory: [ ] dyspnea [ ] hemoptysis [x ] cough [ ] sputum	  Cardiovascular:  [ ] chest pain [ ] palpitations [ ] edema	  Gastrointestinal:  [ ] nausea [ ] vomiting [ ] diarrhea [ ] constipation [ ] pain	  Genitourinary:  [ ] dysuria [ ] frequency [ ] hematuria [ ] discharge [ ] flank pain  [ ] incontinence  Musculoskeletal:  [ ] myalgias [ ] arthralgias [ ] arthritis  [ ] back pain  Neurological:  [ ] headache [ ] seizures  [ ] confusion/altered mental status  Psychiatric:  [ ] anxiety [ ] depression	  Hematology/Lymphatics:  [ ] lymphadenopathy  Endocrine:  [ ] adrenal [ ] thyroid  Allergic/Immunologic:	 [ ] transplant [ ] seasonal    Vital Signs Last 24 Hrs  T(F): 98.2 (10-15-19 @ 12:37), Max: 101.8 (10-13-19 @ 23:39)  Vital Signs Last 24 Hrs  HR: 70 (10-15-19 @ 12:37) (61 - 93)  BP: 117/57 (10-15-19 @ 12:37) (88/48 - 120/63)  RR: 18 (10-15-19 @ 12:37)  SpO2: 96% (10-15-19 @ 12:37) (88% - 97%)  Wt(kg): --    PHYSICAL EXAM:  General: non-toxic, alert, oriented and able to provide adequate history  HEAD/EYES: anicteric, PERRL  ENT:  supple, possible left sided anterior cervical lymph node enlargement  Cardiovascular:   S1, S2  Respiratory:  bibasilar crackles  GI:  soft, non-tender, normal bowel sounds  :  no CVA tenderness   Musculoskeletal:  no synovitis, 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  Neurologic:  grossly non-focal  Skin:  no rash  Lymph: no lymphadenopathy  Psychiatric:  appropriate affect  Vascular:  no phlebitis                            8.3    11.37 )-----------( 192      ( 15 Oct 2019 08:38 )             26.3     10-15    138  |  103  |  31<H>  ----------------------------<  222<H>  5.2   |  24  |  1.81<H>    Ca    8.3<L>      15 Oct 2019 06:25  Phos  3.3     10-15  Mg     2.0     10-15    TPro  5.9<L>  /  Alb  3.0<L>  /  TBili  0.2  /  DBili  x   /  AST  26  /  ALT  30  /  AlkPhos  119  10-14    MICROBIOLOGY:  Culture - Blood (collected 14 Oct 2019 05:23)  Source: .Blood  Gram Stain (15 Oct 2019 06:04):    Growth in aerobic bottle: Gram Negative Coccobacilli  Preliminary Report (15 Oct 2019 06:05):    Growth in aerobic bottle: Gram Negative Coccobacilli    "Due to technical problems, Proteus sp. will Not be reported as part of    the BCID panel until further notice"    ***Blood Panel PCR results on this specimen are available    approximately 3 hours after the Gram stain result.***    Gram stain, PCR, and/or culture results may not always    correspond due to difference in methodologies.    ************************************************************    This PCR assay was performed using [employer].    The following targets are tested for: Enterococcus,    vancomycin resistant enterococci, Listeria monocytogenes,    coagulase negative staphylococci, S. aureus,    methicillin resistant S. aureus, Streptococcus agalactiae    (Group B), S. pneumoniae, S. pyogenes (Group A),    Acinetobacter baumannii, Enterobacter cloacae, E. coli,    Klebsiella oxytoca, K. pneumoniae, Proteus sp.,    Serratia marcescens, Haemophilus influenzae,    Neisseria meningitidis, Pseudomonas aeruginosa, Candida    albicans, C. glabrata, C krusei, C parapsilosis,    C. tropicalis and the KPC resistance gene.  Organism: Blood Culture PCR (15 Oct 2019 06:18)  Organism: Blood Culture PCR (15 Oct 2019 06:18)      -  Haemophilus influenzae: Detec      Method Type: PCR    Culture - Blood (collected 14 Oct 2019 05:23)  Source: .Blood  Preliminary Report (15 Oct 2019 06:01):    No growth to date.              Rapid RVP Result: NotDetec (10-14 @ 10:25)      RADIOLOGY:  < from: Xray Chest 1 View- PORTABLE-Urgent (10.14.19 @ 00:56) >    IMPRESSION: Bibasilar atelectasis, otherwise the lungs are clear.        < end of copied text >    imaging below personally reviewed Patient is a 89y old  Male who presents with a chief complaint of NSTEMI (15 Oct 2019 12:36)    "HPI: 89 yo M PMHx CAD s/p stents, CEA, PVD s/p stents, dementia, GI bleed presents sent in by Cardiologist for management of chest pain and EKG changes. Presented to his cardiologist with complaints of acute on chronic chest pain for one month and dark stools. He describes the chest pain as a bandlike squeezing pain across his chest with radiation down his left arm. EKG was performed at the cardiologist's office significant for JACQUELINE in the anterior leads and STD in the precordial leads. EMS was called due to concern for NSTEMI. ASA 81 administered in ED. Chest pain resolved after arrival to the ED. Currently denying any chest pain. Denies pleurisy, SOB, leg swelling, palpitation, or orthopnea.     Of note recently discharged on 10/9, for workup of melena, and chest pain. Chest pain was thought to be of stable angina. Patient was on  and plavix, H/H remained stable at baseline, no GI intervention. ASA was discontinued upon discharge.     In the ED  VS: Tmax 98.1F, HR 79-93, -150/64-75, RR 15-20, spO2 %  Received: 1 unit PRBC, ASA 81 (11 Oct 2019 23:36)"     HPI and Hospital Course reviewed. 89 y/o M CAD s/p stents, PVD s/p Stents, CEA, recent admission for GI bleed resolved without intervention, discharged on 10/9, was sent in by outpatient cardiologist for NSTEMI. During hospitalization on 10/13 pt spiked fever to 101.8 with new leukocytosis at 11.60. CXR read as bibasilar atelectasis, pt was started on CTX/Azithromycin for possible CAP. Bcx drawn at the time returned positive for H flu. RVP and urine legionella negative, pt has defervesced with stable WBC count. He admits to dry cough which started on his last admission. Denies fever, chills, HA, photophobia, frequency, dysuria, diarrhea, rashes, recent sick contacts or travel, or antibiotics allergies. ID consulted for further work up.    PAST MEDICAL & SURGICAL HISTORY:  Seizure  OM (osteomyelitis)  PVD (peripheral vascular disease)  CAD (Coronary Artery Disease)  HTN (Hypertension)  HTN (Hypertension)  DM (Diabetes Mellitus)  S/P peripheral artery angioplasty with stent placement: left superficial femoral artery  History of total hip arthroplasty  S/P carotid endarterectomy    Allergies  No Known Allergies    ANTIMICROBIALS:    azithromycin  IVPB 500 every 24 hours  azithromycin  IVPB    cefTRIAXone   IVPB 2000 every 24 hours    OTHER MEDS: MEDICATIONS  (STANDING):  aspirin enteric coated 81 daily  atorvastatin 80 at bedtime  clopidogrel Tablet 75 daily  dextrose 40% Gel 15 once PRN  dextrose 50% Injectable 12.5 once  dextrose 50% Injectable 25 once  dextrose 50% Injectable 25 once  donepezil 10 at bedtime  glucagon  Injectable 1 once PRN  insulin glargine Injectable (LANTUS) 14 at bedtime  insulin lispro (HumaLOG) corrective regimen sliding scale  at bedtime  insulin lispro (HumaLOG) corrective regimen sliding scale  three times a day before meals  insulin lispro Injectable (HumaLOG) 3 three times a day before meals  metoprolol tartrate 25 two times a day  ondansetron Injectable 4 every 8 hours PRN  pantoprazole  Injectable 40 two times a day  phenytoin   Capsule 300 at bedtime    SOCIAL HISTORY:  former 3ppd smoker, quit 40 years ago, no alcohol or illicit drug use    FAMILY HISTORY:  No pertinent family history in first degree relatives    REVIEW OF SYSTEMS  [  ] ROS unobtainable because:    [ x ] All other systems negative except as noted below:	    Constitutional:  [ ] fever [ ] chills  [ ] weight loss  [ ] weakness  Skin:  [ ] rash [ ] phlebitis	  Eyes: [ ] icterus [ ] pain  [ ] discharge	  ENMT: [ ] sore throat  [ ] thrush [ ] ulcers [ ] exudates  Respiratory: [ ] dyspnea [ ] hemoptysis [x ] cough [ ] sputum	  Cardiovascular:  [ ] chest pain [ ] palpitations [ ] edema	  Gastrointestinal:  [ ] nausea [ ] vomiting [ ] diarrhea [ ] constipation [ ] pain	  Genitourinary:  [ ] dysuria [ ] frequency [ ] hematuria [ ] discharge [ ] flank pain  [ ] incontinence  Musculoskeletal:  [ ] myalgias [ ] arthralgias [ ] arthritis  [ ] back pain  Neurological:  [ ] headache [ ] seizures  [ ] confusion/altered mental status  Psychiatric:  [ ] anxiety [ ] depression	  Hematology/Lymphatics:  [ ] lymphadenopathy  Endocrine:  [ ] adrenal [ ] thyroid  Allergic/Immunologic:	 [ ] transplant [ ] seasonal    Vital Signs Last 24 Hrs  T(F): 98.2 (10-15-19 @ 12:37), Max: 101.8 (10-13-19 @ 23:39)  Vital Signs Last 24 Hrs  HR: 70 (10-15-19 @ 12:37) (61 - 93)  BP: 117/57 (10-15-19 @ 12:37) (88/48 - 120/63)  RR: 18 (10-15-19 @ 12:37)  SpO2: 96% (10-15-19 @ 12:37) (88% - 97%)    PHYSICAL EXAM:  General: non-toxic, alert, oriented and able to provide adequate history  HEAD/EYES: anicteric, PERRL  ENT:  supple, possible left sided anterior cervical lymph node enlargement  Cardiovascular:   S1, S2  Respiratory:  bibasilar crackles  GI:  soft, non-tender, normal bowel sounds  :  no CVA tenderness   Musculoskeletal:  no synovitis, 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  Neurologic:  grossly non-focal  Skin:  no rash  Lymph: no lymphadenopathy  Psychiatric:  appropriate affect  Vascular:  no phlebitis    Labs:                        8.3    11.37 )-----------( 192      ( 15 Oct 2019 08:38 )             26.3       10-15    138  |  103  |  31<H>  ----------------------------<  222<H>  5.2   |  24  |  1.81<H>    Ca    8.3<L>      15 Oct 2019 06:25  Phos  3.3     10-15  Mg     2.0     10-15    TPro  5.9<L>  /  Alb  3.0<L>  /  TBili  0.2  /  DBili  x   /  AST  26  /  ALT  30  /  AlkPhos  119  10-14    MICROBIOLOGY:  Culture - Blood (collected 14 Oct 2019 05:23)  Source: .Blood  Gram Stain (15 Oct 2019 06:04):    Growth in aerobic bottle: Gram Negative Coccobacilli  Preliminary Report (15 Oct 2019 06:05):    Growth in aerobic bottle: Gram Negative Coccobacilli      -  Haemophilus influenzae: Detec    Culture - Blood (collected 14 Oct 2019 05:23)  Source: .Blood  Preliminary Report (15 Oct 2019 06:01):    No growth to date.    Rapid RVP Result: NotDetec (10-14 @ 10:25)    RADIOLOGY:  < from: Xray Chest 1 View- PORTABLE-Urgent (10.14.19 @ 00:56) >    IMPRESSION: Bibasilar atelectasis, otherwise the lungs are clear.

## 2019-10-15 NOTE — PROGRESS NOTE ADULT - PROBLEM SELECTOR PLAN 4
- Recently admitted for workup of melena concerning for UGIB; ASA held, plavix continued. Hgb stable so no intervention. Reports dark stools  - Received s/p 1U PRBC in ED  - GI consulted - no plan for endoscopy  - CBC Q12 for now, VSQ4hr  - Transfuse prn Hgb<8  - Protonix IV 40mg BID - JACQUELINE in anterior leads, STD in precordial leads, diffuse on repeat EKG  - Troponin 1163 --> ST Depression MI  - Appreciate cardiology and CCU recommendations.   - Given recent history of GIB will management conservatively without heparin gtt  - Trops peaked  - ASA 81 daily  - Plavix 75 mg daily   - Start Toprol 50  - TTE 2D - Moderate segmental LV systolic dysfunction  - Primary Cardiologist Dr. Holman following

## 2019-10-15 NOTE — CONSULT NOTE ADULT - ASSESSMENT
89 y/o M CAD s/p stents, PVD s/p Stents, CEA, recent admission for GI bleed resolved without intervention, discharged on 10/9, was sent in by outpatient cardiologist for NSTEMI found to be febrile with dry cough and bcx positive for Haemophilus influenzae.     Haemophilus influenzae bacteremia  -10/14 1 of 4 bottles, sensitivities pending  -most likely pulmonary source, pt with cough since previous admission last week, consider CT Chest  -repeat Bcx  -c/w CTX for now    all recommendations pending attending signature 89 y/o M CAD s/p stents, PVD s/p Stents, CEA, recent admission for GI bleed resolved without intervention, discharged on 10/9, was sent in by outpatient cardiologist for NSTEMI found to be febrile with dry cough and bcx positive for Haemophilus influenzae.     Haemophilus influenzae bacteremia  -10/14 1 of 4 bottles, sensitivities pending  -most likely pulmonary source, pt with cough since previous admission last week  -repeat Bcx  -c/w CTX for now, d/c azithromycin, anticipate 7-10 day course

## 2019-10-15 NOTE — PROGRESS NOTE ADULT - ASSESSMENT
89 yo M PMHx CAD s/p stents, CEA, PVD s/p stents, dementia, GI bleed presents sent in by Cardiologist for management of NSTEMI. Patient clinically stable, being followed by outpatient Cardiologist Dr. Holman. Newly febrile with elevated WBC, blood cultures drawn - Bcx positive for H. influenzae, on Abx.

## 2019-10-15 NOTE — PROGRESS NOTE ADULT - PROBLEM SELECTOR PLAN 3
- JACQUELINE in anterior leads, STD in precordial leads, diffuse on repeat EKG  - Troponin 1163 --> ST Depression MI  - Appreciate cardiology and CCU recommendations.   - Given recent history of GIB will management conservatively without heparin gtt  - Trops peaked  - ASA 81 daily  - Plavix 75 mg daily   - Metoprolol tartrate 25mg po BID  - TTE 2D - Moderate segmental LV systolic dysfunction  - Primary Cardiologist Dr. Holman following - JACQUELINE in anterior leads, STD in precordial leads, diffuse on repeat EKG  - Troponin 1163 --> ST Depression MI  - Appreciate cardiology and CCU recommendations.   - Given recent history of GIB will management conservatively without heparin gtt  - Trops peaked  - ASA 81 daily  - Plavix 75 mg daily   - Start Toprol 50  - TTE 2D - Moderate segmental LV systolic dysfunction  - Primary Cardiologist Dr. Holman following Prerenal initially likely from sepsis and lasix. Now ATN.   - Increase in Cr > 30% form baseline  - FeUrea 48.2% - suggests intrinsic renal disease  - Ddx includes iatrogenic (recently started on lasix) vs infections (septic 2/2 pneumonia)  - Hold lasix  - Trend BMP

## 2019-10-15 NOTE — PROGRESS NOTE ADULT - PROBLEM SELECTOR PLAN 2
- Increase in Cr > 30% form baseline  - Ddx includes iatrogenic (started on lasix yesterday), infections (meets SIRS criteria, possibly due to PNA), dehydration (2/2 lasix administration)  - F/u FeUrea, urine sodium creatinine, osm  - Hold lasix  - Gentle hydration given patient cardiac history - Increase in Cr > 30% form baseline  - FeUrea 48.2% - suggests intrinsic renal disease  - Ddx includes iatrogenic (recently started on lasix) vs infections (septic 2/2 pneumonia)  - Hold lasix  - Gentle hydration given patient cardiac history  - Trend BMP secondary to above.  Getting LE venous dopplers to make sure DVT not contributing to fever.

## 2019-10-15 NOTE — PROGRESS NOTE ADULT - PROBLEM SELECTOR PLAN 8
- c/w ASA, Plavix  - If Hgb continues to drop, consider holding - Home dependent Lantus 22U QHS, 10U premeal  - Most recent admission was Lantus 14U QHS, and 6U premeal  - Continue with Lantus 14U QHS  - Increase Humalog to 6U premeal

## 2019-10-16 LAB
ANION GAP SERPL CALC-SCNC: 11 MMOL/L — SIGNIFICANT CHANGE UP (ref 5–17)
APPEARANCE UR: CLEAR — SIGNIFICANT CHANGE UP
BILIRUB UR-MCNC: NEGATIVE — SIGNIFICANT CHANGE UP
BUN SERPL-MCNC: 32 MG/DL — HIGH (ref 7–23)
CALCIUM SERPL-MCNC: 8.4 MG/DL — SIGNIFICANT CHANGE UP (ref 8.4–10.5)
CHLORIDE SERPL-SCNC: 103 MMOL/L — SIGNIFICANT CHANGE UP (ref 96–108)
CO2 SERPL-SCNC: 23 MMOL/L — SIGNIFICANT CHANGE UP (ref 22–31)
COLOR SPEC: SIGNIFICANT CHANGE UP
CREAT SERPL-MCNC: 1.65 MG/DL — HIGH (ref 0.5–1.3)
DIFF PNL FLD: NEGATIVE — SIGNIFICANT CHANGE UP
GLUCOSE BLDC GLUCOMTR-MCNC: 148 MG/DL — HIGH (ref 70–99)
GLUCOSE BLDC GLUCOMTR-MCNC: 149 MG/DL — HIGH (ref 70–99)
GLUCOSE BLDC GLUCOMTR-MCNC: 173 MG/DL — HIGH (ref 70–99)
GLUCOSE BLDC GLUCOMTR-MCNC: 281 MG/DL — HIGH (ref 70–99)
GLUCOSE BLDC GLUCOMTR-MCNC: 284 MG/DL — HIGH (ref 70–99)
GLUCOSE SERPL-MCNC: 233 MG/DL — HIGH (ref 70–99)
GLUCOSE UR QL: NEGATIVE — SIGNIFICANT CHANGE UP
HCT VFR BLD CALC: 25.4 % — LOW (ref 39–50)
HCT VFR BLD CALC: 27.4 % — LOW (ref 39–50)
HGB BLD-MCNC: 7.8 G/DL — LOW (ref 13–17)
HGB BLD-MCNC: 8.4 G/DL — LOW (ref 13–17)
KETONES UR-MCNC: NEGATIVE — SIGNIFICANT CHANGE UP
LEUKOCYTE ESTERASE UR-ACNC: NEGATIVE — SIGNIFICANT CHANGE UP
MAGNESIUM SERPL-MCNC: 2 MG/DL — SIGNIFICANT CHANGE UP (ref 1.6–2.6)
MCHC RBC-ENTMCNC: 30.7 GM/DL — LOW (ref 32–36)
MCHC RBC-ENTMCNC: 30.7 GM/DL — LOW (ref 32–36)
MCHC RBC-ENTMCNC: 32.2 PG — SIGNIFICANT CHANGE UP (ref 27–34)
MCHC RBC-ENTMCNC: 32.5 PG — SIGNIFICANT CHANGE UP (ref 27–34)
MCV RBC AUTO: 105 FL — HIGH (ref 80–100)
MCV RBC AUTO: 105.8 FL — HIGH (ref 80–100)
NITRITE UR-MCNC: NEGATIVE — SIGNIFICANT CHANGE UP
NRBC # BLD: 0 /100 WBCS — SIGNIFICANT CHANGE UP (ref 0–0)
PH UR: 6 — SIGNIFICANT CHANGE UP (ref 5–8)
PHOSPHATE SERPL-MCNC: 3.1 MG/DL — SIGNIFICANT CHANGE UP (ref 2.5–4.5)
PLATELET # BLD AUTO: 206 K/UL — SIGNIFICANT CHANGE UP (ref 150–400)
PLATELET # BLD AUTO: 241 K/UL — SIGNIFICANT CHANGE UP (ref 150–400)
POTASSIUM SERPL-MCNC: 4.9 MMOL/L — SIGNIFICANT CHANGE UP (ref 3.5–5.3)
POTASSIUM SERPL-SCNC: 4.9 MMOL/L — SIGNIFICANT CHANGE UP (ref 3.5–5.3)
PROT UR-MCNC: ABNORMAL
RBC # BLD: 2.4 M/UL — LOW (ref 4.2–5.8)
RBC # BLD: 2.61 M/UL — LOW (ref 4.2–5.8)
RBC # FLD: 17.1 % — HIGH (ref 10.3–14.5)
RBC # FLD: 17.3 % — HIGH (ref 10.3–14.5)
SODIUM SERPL-SCNC: 137 MMOL/L — SIGNIFICANT CHANGE UP (ref 135–145)
SP GR SPEC: 1.02 — SIGNIFICANT CHANGE UP (ref 1.01–1.02)
UROBILINOGEN FLD QL: SIGNIFICANT CHANGE UP
WBC # BLD: 10.12 K/UL — SIGNIFICANT CHANGE UP (ref 3.8–10.5)
WBC # BLD: 8.39 K/UL — SIGNIFICANT CHANGE UP (ref 3.8–10.5)
WBC # FLD AUTO: 10.12 K/UL — SIGNIFICANT CHANGE UP (ref 3.8–10.5)
WBC # FLD AUTO: 8.39 K/UL — SIGNIFICANT CHANGE UP (ref 3.8–10.5)

## 2019-10-16 PROCEDURE — 71045 X-RAY EXAM CHEST 1 VIEW: CPT | Mod: 26

## 2019-10-16 PROCEDURE — 99232 SBSQ HOSP IP/OBS MODERATE 35: CPT

## 2019-10-16 PROCEDURE — 99233 SBSQ HOSP IP/OBS HIGH 50: CPT | Mod: GC

## 2019-10-16 RX ORDER — INSULIN GLARGINE 100 [IU]/ML
18 INJECTION, SOLUTION SUBCUTANEOUS AT BEDTIME
Refills: 0 | Status: DISCONTINUED | OUTPATIENT
Start: 2019-10-16 | End: 2019-10-18

## 2019-10-16 RX ORDER — PANTOPRAZOLE SODIUM 20 MG/1
40 TABLET, DELAYED RELEASE ORAL
Refills: 0 | Status: DISCONTINUED | OUTPATIENT
Start: 2019-10-16 | End: 2019-10-22

## 2019-10-16 RX ORDER — FUROSEMIDE 40 MG
20 TABLET ORAL ONCE
Refills: 0 | Status: COMPLETED | OUTPATIENT
Start: 2019-10-16 | End: 2019-10-16

## 2019-10-16 RX ORDER — LOSARTAN POTASSIUM 100 MG/1
25 TABLET, FILM COATED ORAL DAILY
Refills: 0 | Status: DISCONTINUED | OUTPATIENT
Start: 2019-10-16 | End: 2019-10-18

## 2019-10-16 RX ORDER — CEFTRIAXONE 500 MG/1
2000 INJECTION, POWDER, FOR SOLUTION INTRAMUSCULAR; INTRAVENOUS EVERY 24 HOURS
Refills: 0 | Status: DISCONTINUED | OUTPATIENT
Start: 2019-10-16 | End: 2019-10-20

## 2019-10-16 RX ORDER — PETROLATUM,WHITE
1 JELLY (GRAM) TOPICAL THREE TIMES A DAY
Refills: 0 | Status: DISCONTINUED | OUTPATIENT
Start: 2019-10-16 | End: 2019-10-22

## 2019-10-16 RX ADMIN — CEFTRIAXONE 100 MILLIGRAM(S): 500 INJECTION, POWDER, FOR SOLUTION INTRAMUSCULAR; INTRAVENOUS at 16:43

## 2019-10-16 RX ADMIN — Medication 100 MILLIGRAM(S): at 21:23

## 2019-10-16 RX ADMIN — Medication 1000 UNIT(S): at 11:09

## 2019-10-16 RX ADMIN — ATORVASTATIN CALCIUM 80 MILLIGRAM(S): 80 TABLET, FILM COATED ORAL at 21:23

## 2019-10-16 RX ADMIN — Medication 3: at 08:47

## 2019-10-16 RX ADMIN — PANTOPRAZOLE SODIUM 40 MILLIGRAM(S): 20 TABLET, DELAYED RELEASE ORAL at 05:52

## 2019-10-16 RX ADMIN — Medication 1: at 17:19

## 2019-10-16 RX ADMIN — INSULIN GLARGINE 18 UNIT(S): 100 INJECTION, SOLUTION SUBCUTANEOUS at 22:58

## 2019-10-16 RX ADMIN — Medication 100 MILLIGRAM(S): at 13:14

## 2019-10-16 RX ADMIN — CLOPIDOGREL BISULFATE 75 MILLIGRAM(S): 75 TABLET, FILM COATED ORAL at 11:09

## 2019-10-16 RX ADMIN — Medication 6 UNIT(S): at 17:19

## 2019-10-16 RX ADMIN — Medication 100 MILLIGRAM(S): at 05:52

## 2019-10-16 RX ADMIN — POLYETHYLENE GLYCOL 3350 17 GRAM(S): 17 POWDER, FOR SOLUTION ORAL at 11:10

## 2019-10-16 RX ADMIN — DONEPEZIL HYDROCHLORIDE 10 MILLIGRAM(S): 10 TABLET, FILM COATED ORAL at 21:23

## 2019-10-16 RX ADMIN — Medication 300 MILLIGRAM(S): at 21:23

## 2019-10-16 RX ADMIN — Medication 20 MILLIGRAM(S): at 16:43

## 2019-10-16 RX ADMIN — Medication 50 MILLIGRAM(S): at 05:52

## 2019-10-16 RX ADMIN — PANTOPRAZOLE SODIUM 40 MILLIGRAM(S): 20 TABLET, DELAYED RELEASE ORAL at 16:46

## 2019-10-16 RX ADMIN — Medication 6 UNIT(S): at 13:14

## 2019-10-16 RX ADMIN — SENNA PLUS 2 TABLET(S): 8.6 TABLET ORAL at 21:23

## 2019-10-16 RX ADMIN — Medication 3: at 13:14

## 2019-10-16 RX ADMIN — LOSARTAN POTASSIUM 25 MILLIGRAM(S): 100 TABLET, FILM COATED ORAL at 13:14

## 2019-10-16 RX ADMIN — Medication 81 MILLIGRAM(S): at 11:09

## 2019-10-16 RX ADMIN — Medication 6 UNIT(S): at 08:47

## 2019-10-16 NOTE — SWALLOW BEDSIDE ASSESSMENT ADULT - ASR SWALLOW ASPIRATION MONITOR
If overt s/s of aspiration present, d/c PO intake and contact this service at ext [phone]/pneumonia/fever/change of breathing pattern/cough/gurgly voice/upper respiratory infection/throat clearing

## 2019-10-16 NOTE — SWALLOW BEDSIDE ASSESSMENT ADULT - SWALLOW EVAL: DIAGNOSIS
Pt presents with oral dysphagia characterized by slow mastication and oral transit time. Oral exam unremarkable. No overt s/s of laryngeal penetration/aspiration. Pt presents with oral stage dysphagia primarily evident with hard solids as characterized by prolonged mastication and oral transit time. Oral exam was unremarkable although dentures noted to be loose during solid trials.  No signs or symptoms of laryngeal penetration/aspiration evident with any consistency presented.  Pharyngeal swallow judged to be timely with adequate laryngeal elevation.  Of note baseline cough was prior to PO intake.

## 2019-10-16 NOTE — PROGRESS NOTE ADULT - PROBLEM SELECTOR PLAN 1
with bacteremia  -c/w CTX 2g ivpb   -d/c azithro but still f/u urine legionella.  -f/u repeat bcx  -id input appreciated. - Now septic (positive blood cultures)  - Urine legionella negative  - F/u repeat Bcx  - Per ID, will continue ceftriaxone 2g daily

## 2019-10-16 NOTE — PROGRESS NOTE ADULT - PROBLEM SELECTOR PLAN 8
- Home dependent Lantus 22U QHS, 10U premeal  - Most recent admission was Lantus 14U QHS, and 6U premeal  - Continue with Lantus 14U QHS  - Increase Humalog to 6U premeal

## 2019-10-16 NOTE — PROGRESS NOTE ADULT - SUBJECTIVE AND OBJECTIVE BOX
Subjective: Patient seen and examined. No new events except as noted.   Less cough no sob no cp no fever or chills  renal fucntion better    MEDICATIONS:  MEDICATIONS  (STANDING):  aspirin enteric coated 81 milliGRAM(s) Oral daily  atorvastatin 80 milliGRAM(s) Oral at bedtime  cholecalciferol 1000 Unit(s) Oral daily  clopidogrel Tablet 75 milliGRAM(s) Oral daily  dextrose 5%. 1000 milliLiter(s) (50 mL/Hr) IV Continuous <Continuous>  dextrose 50% Injectable 12.5 Gram(s) IV Push once  dextrose 50% Injectable 25 Gram(s) IV Push once  dextrose 50% Injectable 25 Gram(s) IV Push once  docusate sodium 100 milliGRAM(s) Oral three times a day  donepezil 10 milliGRAM(s) Oral at bedtime  insulin glargine Injectable (LANTUS) 18 Unit(s) SubCutaneous at bedtime  insulin lispro (HumaLOG) corrective regimen sliding scale   SubCutaneous at bedtime  insulin lispro (HumaLOG) corrective regimen sliding scale   SubCutaneous three times a day before meals  insulin lispro Injectable (HumaLOG) 6 Unit(s) SubCutaneous three times a day before meals  metoprolol succinate ER 50 milliGRAM(s) Oral daily  pantoprazole  Injectable 40 milliGRAM(s) IV Push two times a day  phenytoin   Capsule 300 milliGRAM(s) Oral at bedtime  polyethylene glycol 3350 17 Gram(s) Oral daily  senna 2 Tablet(s) Oral at bedtime  sodium chloride 0.9%. 1000 milliLiter(s) (50 mL/Hr) IV Continuous <Continuous>      PHYSICAL EXAM:  T(C): 36.8 (10-16-19 @ 05:10), Max: 36.9 (10-16-19 @ 03:34)  HR: 99 (10-16-19 @ 05:10) (70 - 99)  BP: 115/63 (10-16-19 @ 05:10) (114/54 - 126/67)  RR: 18 (10-16-19 @ 05:10) (18 - 18)  SpO2: 94% (10-16-19 @ 05:10) (92% - 97%)  Wt(kg): --  I&O's Summary    15 Oct 2019 07:01  -  16 Oct 2019 07:00  --------------------------------------------------------  IN: 200 mL / OUT: 1 mL / NET: 199 mL    16 Oct 2019 07:01  -  16 Oct 2019 10:32  --------------------------------------------------------  IN: 0 mL / OUT: 150 mL / NET: -150 mL          Appearance: Normal mildy confused NAD 	  HEENT:   Normal oral mucosa, PERRL, EOMI	  Cardiovascular: Normal S1 S2, No JVD, No murmurs ,  Respiratory: Lungs clear to auscultation, normal effort 	  Gastrointestinal:  Soft, Non-tender, + BS	  Skin: No rashes, No ecchymoses, No cyanosis, warm to touch  Musculoskeletal: Normal range of motion, normal strength  Psychiatry:  Mood & affect appropriate  Ext: 1+ edema        LABS:    CARDIAC MARKERS:                                7.8    8.39  )-----------( 206      ( 16 Oct 2019 08:11 )             25.4     10-16    137  |  103  |  32<H>  ----------------------------<  233<H>  4.9   |  23  |  1.65<H>    Ca    8.4      16 Oct 2019 06:25  Phos  3.1     10-16  Mg     2.0     10-16      proBNP:   Lipid Profile:   HgA1c:   TSH:           TELEMETRY: 	    ECG:  	  RADIOLOGY:   < from: Xray Chest 1 View- PORTABLE-Urgent (10.14.19 @ 00:56) >  FINDINGS:  Bibasilar atelectasis, otherwise the lungs are clear.  No pleural effusions. No pneumothorax.  Cardiac size is within normal limits.     IMPRESSION: Bibasilar atelectasis, otherwise the lungs are clear.      < end of copied text >    DIAGNOSTIC TESTING:  [ ] Echocardiogram:  [ ]  Catheterization:  [ ] Stress Test:    OTHER:

## 2019-10-16 NOTE — PROGRESS NOTE ADULT - PROBLEM SELECTOR PLAN 6
- CAD s/p stents, remote hx of MI  - ASA held last admission 2/2 GIB  - Presents with NSTEMI  - c/w ASA, Plavix  - Atorvastatin 80mg QHS  - Start toprol 50 - CAD s/p stents, remote hx of MI  - ASA held last admission 2/2 GIB  - Presents with NSTEMI  - c/w ASA, Plavix  - Atorvastatin 80mg QHS  - toprol 50

## 2019-10-16 NOTE — PROGRESS NOTE ADULT - SUBJECTIVE AND OBJECTIVE BOX
Contact Information:  Sam Carias II, MD, MPH  PGY-1, Internal Medicine  Pager: [phone] ([facility]) /// [phone] ([facility])    HOME ADLER, MRN-[id number]    Patient is a 89y old  Male who presents with a chief complaint of NSTEMI (15 Oct 2019 13:42)      OVERNIGHT EVENTS:    SUBJECTIVE:    CONSTITUTIONAL: No weakness. No fatigue. No fever.  HEAD: No head trauma.   EYES: No vision changes.  ENT: No hearing changes or tinnitus. No ear pain. No changes in smell. No nasal congestion or discharge. No sore throat. No voice hoarseness.   NECK: No neck pain or stiffness. No lumps.  RESPIRATORY: No cough. No SOB. No wheezing. No hemoptysis.   CARDIOVASCULAR: No chest pain. No palpitations.   GASTROINTESTINAL: No dysphagia. No ABD pain. No distension. No constipation. No diarrhea. No pain with defecation. No hematemesis. No hematochezia or melena.  BACK: No back pain.  GENITOURINARY: No dysuria. No frequency or urgency. No hesitancy. No incontinence. No urinary retention. No suprapubic pain. No hematuria.  EXTREMITY: No swelling.  MUSCULOSKELETAL: No joint pain or swelling. No fractures. No stiffness.    SKIN: No rashes. No itching. No skin, hair, or nail changes.  NEUROLOGICAL: No weakness or paralysis. No lightheadedness or dizziness. No HA. No numbness or tingling.   PSYCHIATRIC: No depression.       OBJECTIVE:  Vital Signs Last 24 Hrs  T(C): 36.8 (16 Oct 2019 05:10), Max: 36.9 (16 Oct 2019 03:34)  T(F): 98.3 (16 Oct 2019 05:10), Max: 98.4 (16 Oct 2019 03:34)  HR: 99 (16 Oct 2019 05:10) (61 - 99)  BP: 115/63 (16 Oct 2019 05:10) (88/48 - 126/67)  BP(mean): --  RR: 18 (16 Oct 2019 05:10) (18 - 18)  SpO2: 94% (16 Oct 2019 05:10) (92% - 97%)  I&O's Summary    14 Oct 2019 07:01  -  15 Oct 2019 07:00  --------------------------------------------------------  IN: 1240 mL / OUT: 675 mL / NET: 565 mL    15 Oct 2019 07:01  -  16 Oct 2019 06:52  --------------------------------------------------------  IN: 200 mL / OUT: 1 mL / NET: 199 mL        MEDICATIONS  (STANDING):  aspirin enteric coated 81 milliGRAM(s) Oral daily  atorvastatin 80 milliGRAM(s) Oral at bedtime  cefTRIAXone   IVPB 2000 milliGRAM(s) IV Intermittent every 24 hours  cholecalciferol 1000 Unit(s) Oral daily  clopidogrel Tablet 75 milliGRAM(s) Oral daily  dextrose 5%. 1000 milliLiter(s) (50 mL/Hr) IV Continuous <Continuous>  dextrose 50% Injectable 12.5 Gram(s) IV Push once  dextrose 50% Injectable 25 Gram(s) IV Push once  dextrose 50% Injectable 25 Gram(s) IV Push once  docusate sodium 100 milliGRAM(s) Oral three times a day  donepezil 10 milliGRAM(s) Oral at bedtime  insulin glargine Injectable (LANTUS) 14 Unit(s) SubCutaneous at bedtime  insulin lispro (HumaLOG) corrective regimen sliding scale   SubCutaneous at bedtime  insulin lispro (HumaLOG) corrective regimen sliding scale   SubCutaneous three times a day before meals  insulin lispro Injectable (HumaLOG) 6 Unit(s) SubCutaneous three times a day before meals  metoprolol succinate ER 50 milliGRAM(s) Oral daily  pantoprazole  Injectable 40 milliGRAM(s) IV Push two times a day  phenytoin   Capsule 300 milliGRAM(s) Oral at bedtime  polyethylene glycol 3350 17 Gram(s) Oral daily  senna 2 Tablet(s) Oral at bedtime  sodium chloride 0.9%. 1000 milliLiter(s) (50 mL/Hr) IV Continuous <Continuous>    MEDICATIONS  (PRN):  dextrose 40% Gel 15 Gram(s) Oral once PRN Blood Glucose LESS THAN 70 milliGRAM(s)/deciliter  glucagon  Injectable 1 milliGRAM(s) IntraMuscular once PRN Glucose LESS THAN 70 milligrams/deciliter  ondansetron Injectable 4 milliGRAM(s) IV Push every 8 hours PRN Nausea and/or Vomiting    Allergies    No Known Allergies    Intolerances        CONSTITUTIONAL: No acute distress. Awake and alert.  HEAD: No evidence of trauma. Structures WNL.  EYES: +PERRL. +EOMI. No scleral icterus. No conjunctival injection.  ENT: Hearing intact b/l. Moist oral mucosa. No abnormal lesions. No erythema. No pharyngeal exudates.   NECK: Supple. Appropriate ROM. No stiffness. No masses or lymphadenopathy.  RESPIRATORY: CTAB. No wheezes, rales, or rhonchi. No accessory muscle use. No apparent respiratory distress.  CARDIOVASCULAR: +S1/S2. No audible S3/S4. Regular rate and rhythm. No murmurs, rubs, or gallops.  GASTROINTESTINAL: Soft, nontender, nondistended. +BS. No rebound or guarding.   BACK: No spinal or paraspinal tenderness. No CVA tenderness.  GENITOURINARY: Normal appearing genitalia.  VASCULAR: 2+ radial pulses x b/l UE; 2+ DP pulses x b/l LE.  EXTREMITY: No LE swelling or edema. EXTs warm to touch.  MUSCULOSKELETAL: 5/5 strength x 4EXT. Movement evident in all limbs. No tenderness on palpation.  DERMATOLOGICAL: No abnormal rashes or lesions. No evident hair, skin, or nail changes.  NEUROLOGICAL: CN 2-12 grossly intact. No focal deficits. Sensation intact x 4EXT. A&Ox3 (oriented to person, place, and time).  PSYCHIATRIC: Appropriate affect.                            8.3    11.37 )-----------( 192      ( 15 Oct 2019 08:38 )             26.3       10-15    138  |  103  |  31<H>  ----------------------------<  222<H>  5.2   |  24  |  1.81<H>    Ca    8.3<L>      15 Oct 2019 06:25  Phos  3.3     10-15  Mg     2.0     10-15      CAPILLARY BLOOD GLUCOSE      POCT Blood Glucose.: 214 mg/dL (15 Oct 2019 22:09)  POCT Blood Glucose.: 274 mg/dL (15 Oct 2019 17:47)  POCT Blood Glucose.: 292 mg/dL (15 Oct 2019 12:53)  POCT Blood Glucose.: 243 mg/dL (15 Oct 2019 08:37)              Culture - Blood (collected 14 Oct 2019 05:23)  Source: .Blood  Gram Stain (15 Oct 2019 06:04):    Growth in aerobic bottle: Gram Negative Coccobacilli  Preliminary Report (15 Oct 2019 06:05):    Growth in aerobic bottle: Gram Negative Coccobacilli    "Due to technical problems, Proteus sp. will Not be reported as part of    the BCID panel until further notice"    ***Blood Panel PCR results on this specimen are available    approximately 3 hours after the Gram stain result.***    Gram stain, PCR, and/or culture results may not always    correspond due to difference in methodologies.    ************************************************************    This PCR assay was performed using [employer].    The following targets are tested for: Enterococcus,    vancomycin resistant enterococci, Listeria monocytogenes,    coagulase negative staphylococci, S. aureus,    methicillin resistant S. aureus, Streptococcus agalactiae    (Group B), S. pneumoniae, S. pyogenes (Group A),    Acinetobacter baumannii, Enterobacter cloacae, E. coli,    Klebsiella oxytoca, K. pneumoniae, Proteus sp.,    Serratia marcescens, Haemophilus influenzae,    Neisseria meningitidis, Pseudomonas aeruginosa, Candida    albicans, C. glabrata, C krusei, C parapsilosis,    C. tropicalis and the KPC resistance gene.  Organism: Blood Culture PCR (15 Oct 2019 06:18)  Organism: Blood Culture PCR (15 Oct 2019 06:18)    Culture - Blood (collected 14 Oct 2019 05:23)  Source: .Blood  Preliminary Report (15 Oct 2019 06:01):    No growth to date.          RADIOLOGY AND ADDITIONAL TESTS:    CONSULTANT NOTES REVIEWED:    CARE DISCUSSED WITH THE FOLLOWING CONSULTANTS/PROVIDERS: Contact Information:  Sam Carias II, MD, MPH  PGY-1, Internal Medicine  Pager: [phone] ([facility]) /// [phone] ([facility])    HOME ADLER, MRN-[id number]    Patient is a 89y old  Male who presents with a chief complaint of NSTEMI (15 Oct 2019 13:42)      OVERNIGHT EVENTS: No overnight events.     SUBJECTIVE: Patient evaluated at bedside. C/o nose bleed after having picked it, possibly due to dried nares 2/2 nasal cannula (discontinued). Denies N/V, lightheadedness, dizziness, fever, SOB.    CONSTITUTIONAL: No weakness. No fatigue. No fever.  HEAD: No head trauma.   EYES: No vision changes.  ENT: No hearing changes or tinnitus. No ear pain. No changes in smell. No nasal congestion or discharge. No sore throat. No voice hoarseness.   NECK: No neck pain or stiffness. No lumps.  RESPIRATORY: No cough. No SOB. No wheezing. No hemoptysis.   CARDIOVASCULAR: No chest pain. No palpitations.   GASTROINTESTINAL: No dysphagia. No ABD pain. No distension. No constipation. No diarrhea. No pain with defecation. No hematemesis. No hematochezia or melena.  BACK: No back pain.  GENITOURINARY: No dysuria. No frequency or urgency. No hesitancy. No incontinence. No urinary retention. No suprapubic pain. No hematuria.  EXTREMITY: No swelling.  MUSCULOSKELETAL: No joint pain or swelling. No fractures. No stiffness.    SKIN: No rashes. No itching. No skin, hair, or nail changes.  NEUROLOGICAL: No weakness or paralysis. No lightheadedness or dizziness. No HA. No numbness or tingling.   PSYCHIATRIC: No depression.       OBJECTIVE:  Vital Signs Last 24 Hrs  T(C): 36.8 (16 Oct 2019 05:10), Max: 36.9 (16 Oct 2019 03:34)  T(F): 98.3 (16 Oct 2019 05:10), Max: 98.4 (16 Oct 2019 03:34)  HR: 99 (16 Oct 2019 05:10) (61 - 99)  BP: 115/63 (16 Oct 2019 05:10) (88/48 - 126/67)  BP(mean): --  RR: 18 (16 Oct 2019 05:10) (18 - 18)  SpO2: 94% (16 Oct 2019 05:10) (92% - 97%)  I&O's Summary    14 Oct 2019 07:01  -  15 Oct 2019 07:00  --------------------------------------------------------  IN: 1240 mL / OUT: 675 mL / NET: 565 mL    15 Oct 2019 07:01  -  16 Oct 2019 06:52  --------------------------------------------------------  IN: 200 mL / OUT: 1 mL / NET: 199 mL        MEDICATIONS  (STANDING):  aspirin enteric coated 81 milliGRAM(s) Oral daily  atorvastatin 80 milliGRAM(s) Oral at bedtime  cefTRIAXone   IVPB 2000 milliGRAM(s) IV Intermittent every 24 hours  cholecalciferol 1000 Unit(s) Oral daily  clopidogrel Tablet 75 milliGRAM(s) Oral daily  dextrose 5%. 1000 milliLiter(s) (50 mL/Hr) IV Continuous <Continuous>  dextrose 50% Injectable 12.5 Gram(s) IV Push once  dextrose 50% Injectable 25 Gram(s) IV Push once  dextrose 50% Injectable 25 Gram(s) IV Push once  docusate sodium 100 milliGRAM(s) Oral three times a day  donepezil 10 milliGRAM(s) Oral at bedtime  insulin glargine Injectable (LANTUS) 14 Unit(s) SubCutaneous at bedtime  insulin lispro (HumaLOG) corrective regimen sliding scale   SubCutaneous at bedtime  insulin lispro (HumaLOG) corrective regimen sliding scale   SubCutaneous three times a day before meals  insulin lispro Injectable (HumaLOG) 6 Unit(s) SubCutaneous three times a day before meals  metoprolol succinate ER 50 milliGRAM(s) Oral daily  pantoprazole  Injectable 40 milliGRAM(s) IV Push two times a day  phenytoin   Capsule 300 milliGRAM(s) Oral at bedtime  polyethylene glycol 3350 17 Gram(s) Oral daily  senna 2 Tablet(s) Oral at bedtime  sodium chloride 0.9%. 1000 milliLiter(s) (50 mL/Hr) IV Continuous <Continuous>    MEDICATIONS  (PRN):  dextrose 40% Gel 15 Gram(s) Oral once PRN Blood Glucose LESS THAN 70 milliGRAM(s)/deciliter  glucagon  Injectable 1 milliGRAM(s) IntraMuscular once PRN Glucose LESS THAN 70 milligrams/deciliter  ondansetron Injectable 4 milliGRAM(s) IV Push every 8 hours PRN Nausea and/or Vomiting    Allergies    No Known Allergies    Intolerances        CONSTITUTIONAL: No acute distress. Awake and alert.  HEAD: No evidence of trauma. Structures WNL.  EYES: +PERRL. +EOMI. No scleral icterus. No conjunctival injection.  ENT: Hearing intact b/l. Moist oral mucosa. No abnormal lesions. No erythema. No pharyngeal exudates.   NECK: Supple. Appropriate ROM. No stiffness. No masses or lymphadenopathy.  RESPIRATORY: CTAB. No wheezes, rales, or rhonchi. No accessory muscle use. No apparent respiratory distress.  CARDIOVASCULAR: +S1/S2. No audible S3/S4. Regular rate and rhythm. No murmurs, rubs, or gallops.  GASTROINTESTINAL: Soft, nontender, nondistended. +BS. No rebound or guarding.   BACK: No spinal or paraspinal tenderness. No CVA tenderness.  GENITOURINARY: Normal appearing genitalia.  VASCULAR: 2+ radial pulses x b/l UE; 2+ DP pulses x b/l LE.  EXTREMITY: No LE swelling or edema. EXTs warm to touch.  MUSCULOSKELETAL: 5/5 strength x 4EXT. Movement evident in all limbs. No tenderness on palpation.  DERMATOLOGICAL: No abnormal rashes or lesions. No evident hair, skin, or nail changes.  NEUROLOGICAL: CN 2-12 grossly intact. No focal deficits. Sensation intact x 4EXT. A&Ox3 (oriented to person, place, and time).  PSYCHIATRIC: Appropriate affect.                            8.3    11.37 )-----------( 192      ( 15 Oct 2019 08:38 )             26.3       10-15    138  |  103  |  31<H>  ----------------------------<  222<H>  5.2   |  24  |  1.81<H>    Ca    8.3<L>      15 Oct 2019 06:25  Phos  3.3     10-15  Mg     2.0     10-15      CAPILLARY BLOOD GLUCOSE      POCT Blood Glucose.: 214 mg/dL (15 Oct 2019 22:09)  POCT Blood Glucose.: 274 mg/dL (15 Oct 2019 17:47)  POCT Blood Glucose.: 292 mg/dL (15 Oct 2019 12:53)  POCT Blood Glucose.: 243 mg/dL (15 Oct 2019 08:37)              Culture - Blood (collected 14 Oct 2019 05:23)  Source: .Blood  Gram Stain (15 Oct 2019 06:04):    Growth in aerobic bottle: Gram Negative Coccobacilli  Preliminary Report (15 Oct 2019 06:05):    Growth in aerobic bottle: Gram Negative Coccobacilli    "Due to technical problems, Proteus sp. will Not be reported as part of    the BCID panel until further notice"    ***Blood Panel PCR results on this specimen are available    approximately 3 hours after the Gram stain result.***    Gram stain, PCR, and/or culture results may not always    correspond due to difference in methodologies.    ************************************************************    This PCR assay was performed using [email or web address].    The following targets are tested for: Enterococcus,    vancomycin resistant enterococci, Listeria monocytogenes,    coagulase negative staphylococci, S. aureus,    methicillin resistant S. aureus, Streptococcus agalactiae    (Group B), S. pneumoniae, S. pyogenes (Group A),    Acinetobacter baumannii, Enterobacter cloacae, E. coli,    Klebsiella oxytoca, K. pneumoniae, Proteus sp.,    Serratia marcescens, Haemophilus influenzae,    Neisseria meningitidis, Pseudomonas aeruginosa, Candida    albicans, C. glabrata, C krusei, C parapsilosis,    C. tropicalis and the KPC resistance gene.  Organism: Blood Culture PCR (15 Oct 2019 06:18)  Organism: Blood Culture PCR (15 Oct 2019 06:18)    Culture - Blood (collected 14 Oct 2019 05:23)  Source: .Blood  Preliminary Report (15 Oct 2019 06:01):    No growth to date.          RADIOLOGY AND ADDITIONAL TESTS:    CONSULTANT NOTES REVIEWED:    CARE DISCUSSED WITH THE FOLLOWING CONSULTANTS/PROVIDERS: Contact Information:  Sam Carias II, MD, MPH  PGY-1, Internal Medicine  Pager: [phone] ([facility]) /// [phone] ([facility])    HOME ADLER, MRN-[id number]    Patient is a 89y old  Male who presents with a chief complaint of NSTEMI (15 Oct 2019 13:42)      OVERNIGHT EVENTS: No overnight events.     SUBJECTIVE: Patient evaluated at bedside. C/o nose bleed after having picked it, possibly due to dried nares 2/2 nasal cannula (discontinued). Denies N/V, lightheadedness, dizziness, fever, SOB.    CONSTITUTIONAL: No weakness. No fatigue. No fever.  HEAD: No head trauma.   EYES: No vision changes.  ENT: Dried blood in nares b/l.   NECK: No neck pain or stiffness. No lumps.  RESPIRATORY: No cough. No SOB. No wheezing. No hemoptysis.   CARDIOVASCULAR: No chest pain. No palpitations.   GASTROINTESTINAL: No dysphagia. No ABD pain. No distension. No constipation. No diarrhea. No pain with defecation. No hematemesis. No hematochezia or melena.  BACK: No back pain.  GENITOURINARY: No dysuria. No frequency or urgency. No hesitancy. No incontinence. No urinary retention. No suprapubic pain. No hematuria.  EXTREMITY: No swelling.  MUSCULOSKELETAL: No joint pain or swelling. No fractures. No stiffness.    SKIN: No rashes. No itching. No skin, hair, or nail changes.  NEUROLOGICAL: No weakness or paralysis. No lightheadedness or dizziness. No HA. No numbness or tingling.   PSYCHIATRIC: No depression.       OBJECTIVE:  Vital Signs Last 24 Hrs  T(C): 36.8 (16 Oct 2019 05:10), Max: 36.9 (16 Oct 2019 03:34)  T(F): 98.3 (16 Oct 2019 05:10), Max: 98.4 (16 Oct 2019 03:34)  HR: 99 (16 Oct 2019 05:10) (61 - 99)  BP: 115/63 (16 Oct 2019 05:10) (88/48 - 126/67)  BP(mean): --  RR: 18 (16 Oct 2019 05:10) (18 - 18)  SpO2: 94% (16 Oct 2019 05:10) (92% - 97%)  I&O's Summary    14 Oct 2019 07:01  -  15 Oct 2019 07:00  --------------------------------------------------------  IN: 1240 mL / OUT: 675 mL / NET: 565 mL    15 Oct 2019 07:01  -  16 Oct 2019 06:52  --------------------------------------------------------  IN: 200 mL / OUT: 1 mL / NET: 199 mL        MEDICATIONS  (STANDING):  aspirin enteric coated 81 milliGRAM(s) Oral daily  atorvastatin 80 milliGRAM(s) Oral at bedtime  cefTRIAXone   IVPB 2000 milliGRAM(s) IV Intermittent every 24 hours  cholecalciferol 1000 Unit(s) Oral daily  clopidogrel Tablet 75 milliGRAM(s) Oral daily  dextrose 5%. 1000 milliLiter(s) (50 mL/Hr) IV Continuous <Continuous>  dextrose 50% Injectable 12.5 Gram(s) IV Push once  dextrose 50% Injectable 25 Gram(s) IV Push once  dextrose 50% Injectable 25 Gram(s) IV Push once  docusate sodium 100 milliGRAM(s) Oral three times a day  donepezil 10 milliGRAM(s) Oral at bedtime  insulin glargine Injectable (LANTUS) 14 Unit(s) SubCutaneous at bedtime  insulin lispro (HumaLOG) corrective regimen sliding scale   SubCutaneous at bedtime  insulin lispro (HumaLOG) corrective regimen sliding scale   SubCutaneous three times a day before meals  insulin lispro Injectable (HumaLOG) 6 Unit(s) SubCutaneous three times a day before meals  metoprolol succinate ER 50 milliGRAM(s) Oral daily  pantoprazole  Injectable 40 milliGRAM(s) IV Push two times a day  phenytoin   Capsule 300 milliGRAM(s) Oral at bedtime  polyethylene glycol 3350 17 Gram(s) Oral daily  senna 2 Tablet(s) Oral at bedtime  sodium chloride 0.9%. 1000 milliLiter(s) (50 mL/Hr) IV Continuous <Continuous>    MEDICATIONS  (PRN):  dextrose 40% Gel 15 Gram(s) Oral once PRN Blood Glucose LESS THAN 70 milliGRAM(s)/deciliter  glucagon  Injectable 1 milliGRAM(s) IntraMuscular once PRN Glucose LESS THAN 70 milligrams/deciliter  ondansetron Injectable 4 milliGRAM(s) IV Push every 8 hours PRN Nausea and/or Vomiting    Allergies    No Known Allergies    Intolerances        CONSTITUTIONAL: No acute distress. Awake and alert.  HEAD: No evidence of trauma. Structures WNL.  EYES: +PERRL. Slight conjunctival pallor.  ENT: Dry oral mucosa. No abnormal lesions. No erythema. No pharyngeal exudates.   NECK: Supple.   RESPIRATORY: CTAB. No wheezes, rales, or rhonchi. No accessory muscle use. No apparent respiratory distress.  CARDIOVASCULAR: +S1/S2. No audible S3/S4. Telemetry 80-90, NSR.  GASTROINTESTINAL: Soft, nontender, nondistended. +BS. No rebound or guarding.   BACK: No spinal or paraspinal tenderness. No CVA tenderness.  VASCULAR: 2+ radial pulses x b/l UE; 2+ DP pulses x b/l LE.  EXTREMITY: No LE swelling or edema. EXTs warm to touch.  MUSCULOSKELETAL: Movement in all limbs  DERMATOLOGICAL: No abnormal rashes or lesions. No evident hair, skin, or nail changes.  NEUROLOGICAL: CN 2-12 grossly intact. No focal deficits. Sensation intact x 4EXT. A&Ox3 (oriented to person, place, and time).                        8.3    11.37 )-----------( 192      ( 15 Oct 2019 08:38 )             26.3       10-15    138  |  103  |  31<H>  ----------------------------<  222<H>  5.2   |  24  |  1.81<H>    Ca    8.3<L>      15 Oct 2019 06:25  Phos  3.3     10-15  Mg     2.0     10-15      CAPILLARY BLOOD GLUCOSE      POCT Blood Glucose.: 214 mg/dL (15 Oct 2019 22:09)  POCT Blood Glucose.: 274 mg/dL (15 Oct 2019 17:47)  POCT Blood Glucose.: 292 mg/dL (15 Oct 2019 12:53)  POCT Blood Glucose.: 243 mg/dL (15 Oct 2019 08:37)              Culture - Blood (collected 14 Oct 2019 05:23)  Source: .Blood  Gram Stain (15 Oct 2019 06:04):    Growth in aerobic bottle: Gram Negative Coccobacilli  Preliminary Report (15 Oct 2019 06:05):    Growth in aerobic bottle: Gram Negative Coccobacilli    "Due to technical problems, Proteus sp. will Not be reported as part of    the BCID panel until further notice"    ***Blood Panel PCR results on this specimen are available    approximately 3 hours after the Gram stain result.***    Gram stain, PCR, and/or culture results may not always    correspond due to difference in methodologies.    ************************************************************    This PCR assay was performed using [employer].    The following targets are tested for: Enterococcus,    vancomycin resistant enterococci, Listeria monocytogenes,    coagulase negative staphylococci, S. aureus,    methicillin resistant S. aureus, Streptococcus agalactiae    (Group B), S. pneumoniae, S. pyogenes (Group A),    Acinetobacter baumannii, Enterobacter cloacae, E. coli,    Klebsiella oxytoca, K. pneumoniae, Proteus sp.,    Serratia marcescens, Haemophilus influenzae,    Neisseria meningitidis, Pseudomonas aeruginosa, Candida    albicans, C. glabrata, C krusei, C parapsilosis,    C. tropicalis and the KPC resistance gene.  Organism: Blood Culture PCR (15 Oct 2019 06:18)  Organism: Blood Culture PCR (15 Oct 2019 06:18)    Culture - Blood (collected 14 Oct 2019 05:23)  Source: .Blood  Preliminary Report (15 Oct 2019 06:01):    No growth to date.          RADIOLOGY AND ADDITIONAL TESTS:    CONSULTANT NOTES REVIEWED:    CARE DISCUSSED WITH THE FOLLOWING CONSULTANTS/PROVIDERS: Contact Information:  Sam Carias II, MD, MPH  PGY-1, Internal Medicine  Pager: [phone] ([facility]) /// [phone] ([facility])    HOME ADLER, MRN-[id number]    Patient is a 89y old  Male who presents with a chief complaint of NSTEMI (15 Oct 2019 13:42)    OVERNIGHT EVENTS: No overnight events.     SUBJECTIVE: Patient evaluated at bedside. C/o nose bleed after having picked it, possibly due to dried nares 2/2 nasal cannula (discontinued). Denies N/V, lightheadedness, dizziness, fever, SOB.    CONSTITUTIONAL: No weakness. No fatigue. No fever.  HEAD: No head trauma.   EYES: No vision changes.  ENT: Dried blood in nares b/l.   NECK: No neck pain or stiffness. No lumps.  RESPIRATORY: No cough. No SOB. No wheezing. No hemoptysis.   CARDIOVASCULAR: No chest pain. No palpitations.   GASTROINTESTINAL: No dysphagia. No ABD pain. No distension. No constipation. No diarrhea. No pain with defecation. No hematemesis. No hematochezia or melena.  BACK: No back pain.  GENITOURINARY: No dysuria. No frequency or urgency. No hesitancy. No incontinence. No urinary retention. No suprapubic pain. No hematuria.  EXTREMITY: No swelling.  MUSCULOSKELETAL: No joint pain or swelling. No fractures. No stiffness.    SKIN: No rashes. No itching. No skin, hair, or nail changes.  NEUROLOGICAL: No weakness or paralysis. No lightheadedness or dizziness. No HA. No numbness or tingling.   PSYCHIATRIC: No depression.       OBJECTIVE:  Vital Signs Last 24 Hrs  T(C): 36.8 (16 Oct 2019 05:10), Max: 36.9 (16 Oct 2019 03:34)  T(F): 98.3 (16 Oct 2019 05:10), Max: 98.4 (16 Oct 2019 03:34)  HR: 99 (16 Oct 2019 05:10) (61 - 99)  BP: 115/63 (16 Oct 2019 05:10) (88/48 - 126/67)  BP(mean): --  RR: 18 (16 Oct 2019 05:10) (18 - 18)  SpO2: 94% (16 Oct 2019 05:10) (92% - 97%)  I&O's Summary    14 Oct 2019 07:01  -  15 Oct 2019 07:00  --------------------------------------------------------  IN: 1240 mL / OUT: 675 mL / NET: 565 mL    15 Oct 2019 07:01  -  16 Oct 2019 06:52  --------------------------------------------------------  IN: 200 mL / OUT: 1 mL / NET: 199 mL        MEDICATIONS  (STANDING):  aspirin enteric coated 81 milliGRAM(s) Oral daily  atorvastatin 80 milliGRAM(s) Oral at bedtime  cefTRIAXone   IVPB 2000 milliGRAM(s) IV Intermittent every 24 hours  cholecalciferol 1000 Unit(s) Oral daily  clopidogrel Tablet 75 milliGRAM(s) Oral daily  dextrose 5%. 1000 milliLiter(s) (50 mL/Hr) IV Continuous <Continuous>  dextrose 50% Injectable 12.5 Gram(s) IV Push once  dextrose 50% Injectable 25 Gram(s) IV Push once  dextrose 50% Injectable 25 Gram(s) IV Push once  docusate sodium 100 milliGRAM(s) Oral three times a day  donepezil 10 milliGRAM(s) Oral at bedtime  insulin glargine Injectable (LANTUS) 14 Unit(s) SubCutaneous at bedtime  insulin lispro (HumaLOG) corrective regimen sliding scale   SubCutaneous at bedtime  insulin lispro (HumaLOG) corrective regimen sliding scale   SubCutaneous three times a day before meals  insulin lispro Injectable (HumaLOG) 6 Unit(s) SubCutaneous three times a day before meals  metoprolol succinate ER 50 milliGRAM(s) Oral daily  pantoprazole  Injectable 40 milliGRAM(s) IV Push two times a day  phenytoin   Capsule 300 milliGRAM(s) Oral at bedtime  polyethylene glycol 3350 17 Gram(s) Oral daily  senna 2 Tablet(s) Oral at bedtime  sodium chloride 0.9%. 1000 milliLiter(s) (50 mL/Hr) IV Continuous <Continuous>    MEDICATIONS  (PRN):  dextrose 40% Gel 15 Gram(s) Oral once PRN Blood Glucose LESS THAN 70 milliGRAM(s)/deciliter  glucagon  Injectable 1 milliGRAM(s) IntraMuscular once PRN Glucose LESS THAN 70 milligrams/deciliter  ondansetron Injectable 4 milliGRAM(s) IV Push every 8 hours PRN Nausea and/or Vomiting    Allergies    No Known Allergies    Intolerances        CONSTITUTIONAL: No acute distress. Awake and alert.  HEAD: No evidence of trauma. Structures WNL.  EYES: +PERRL. Slight conjunctival pallor.  ENT: Dry oral mucosa. No abnormal lesions. No erythema. No pharyngeal exudates.   NECK: Supple.   RESPIRATORY: CTAB. No wheezes, rales, or rhonchi. No accessory muscle use. No apparent respiratory distress.  CARDIOVASCULAR: +S1/S2. No audible S3/S4. Telemetry 80-90, NSR. Extremities warm and well perfused. 1+ LE edema which is equal.   GASTROINTESTINAL: Soft, nontender, nondistended. +BS. No rebound or guarding.   BACK: No spinal or paraspinal tenderness. No CVA tenderness.  VASCULAR: 2+ radial pulses x b/l UE; 2+ DP pulses x b/l LE.  MUSCULOSKELETAL: Movement in all limbs. No neck stiffness.   DERMATOLOGICAL: No abnormal rashes or lesions. No evident hair, skin, or nail changes.  NEUROLOGICAL: CN 2-12 grossly intact. No focal deficits. Sensation intact x 4EXT. A&Ox3 (oriented to person, place, and time).                        8.3    11.37 )-----------( 192      ( 15 Oct 2019 08:38 )             26.3       10-15    138  |  103  |  31<H>  ----------------------------<  222<H>  5.2   |  24  |  1.81<H>    Ca    8.3<L>      15 Oct 2019 06:25  Phos  3.3     10-15  Mg     2.0     10-15      CAPILLARY BLOOD GLUCOSE      POCT Blood Glucose.: 214 mg/dL (15 Oct 2019 22:09)  POCT Blood Glucose.: 274 mg/dL (15 Oct 2019 17:47)  POCT Blood Glucose.: 292 mg/dL (15 Oct 2019 12:53)  POCT Blood Glucose.: 243 mg/dL (15 Oct 2019 08:37)              Culture - Blood (collected 14 Oct 2019 05:23)  Source: .Blood  Gram Stain (15 Oct 2019 06:04):    Growth in aerobic bottle: Gram Negative Coccobacilli  Preliminary Report (15 Oct 2019 06:05):    Growth in aerobic bottle: Gram Negative Coccobacilli    "Due to technical problems, Proteus sp. will Not be reported as part of    the BCID panel until further notice"    ***Blood Panel PCR results on this specimen are available    approximately 3 hours after the Gram stain result.***    Gram stain, PCR, and/or culture results may not always    correspond due to difference in methodologies.    ************************************************************    This PCR assay was performed using [employer].    The following targets are tested for: Enterococcus,    vancomycin resistant enterococci, Listeria monocytogenes,    coagulase negative staphylococci, S. aureus,    methicillin resistant S. aureus, Streptococcus agalactiae    (Group B), S. pneumoniae, S. pyogenes (Group A),    Acinetobacter baumannii, Enterobacter cloacae, E. coli,    Klebsiella oxytoca, K. pneumoniae, Proteus sp.,    Serratia marcescens, Haemophilus influenzae,    Neisseria meningitidis, Pseudomonas aeruginosa, Candida    albicans, C. glabrata, C krusei, C parapsilosis,    C. tropicalis and the KPC resistance gene.  Organism: Blood Culture PCR (15 Oct 2019 06:18)  Organism: Blood Culture PCR (15 Oct 2019 06:18)    Culture - Blood (collected 14 Oct 2019 05:23)  Source: .Blood  Preliminary Report (15 Oct 2019 06:01):    No growth to date.      PERSONALLY REVIEWED CXR:  B/L pleural effusions possible however low lung volumes.

## 2019-10-16 NOTE — PROGRESS NOTE ADULT - ASSESSMENT
89 yo M PMHx CAD s/p stents, CEA, PVD s/p stents, dementia, GI bleed presents sent in by Cardiologist for management of chest pain and EKG changes.  No leukocytosis, no fever  BCX with H influenzae  CXR atelectasis  Likely respiratory source for H influenzae  Overall, Bacteremia, respiratory tract infection, leukocytosis  - Ceftriaxone 2g q 24  - F/U pending BCXs  - Trend WBC  - Monitor for further symptoms/resp status; monitor mental status  - Hopefully can compete treatment course with PO B-lactam, but would try to get 5 days of IV abx completed before transitioning    Dieter Ford MD  Pager [phone]  After 5pm and on weekends call [phone]

## 2019-10-16 NOTE — PROGRESS NOTE ADULT - PROBLEM SELECTOR PLAN 5
- Recently admitted for workup of melena concerning for UGIB; ASA held, plavix continued. Hgb stable so no intervention. Reports dark stools  - Received s/p 1U PRBC in ED  - GI consulted - no plan for endoscopy  - CBC Q12 for now, VSQ4hr  - Transfuse prn Hgb<8  - Protonix IV 40mg BID

## 2019-10-16 NOTE — PROGRESS NOTE ADULT - PROBLEM SELECTOR PLAN 4
- JACQUELINE in anterior leads, STD in precordial leads, diffuse on repeat EKG  - Troponin 1163 --> ST Depression MI  - Appreciate cardiology and CCU recommendations.   - Given recent history of GIB will management conservatively without heparin gtt  - Trops peaked  - ASA 81 daily  - Plavix 75 mg daily   - Start Toprol 50  - TTE 2D - Moderate segmental LV systolic dysfunction  - Primary Cardiologist Dr. Holman following - JACQUELINE in anterior leads, STD in precordial leads, diffuse on repeat EKG  - Troponin 1163 --> ST Depression MI  - Appreciate cardiology and CCU recommendations  - Given recent history of GIB will management conservatively without heparin gtt  - Trops peaked  - ASA 81 daily  - Plavix 75 mg daily   - C/w Toprol 50  - Per cardiology, start Cozaar 25  - TTE 2D - Moderate segmental LV systolic dysfunction  - Primary Cardiologist Dr. Holman following

## 2019-10-16 NOTE — SWALLOW BEDSIDE ASSESSMENT ADULT - COMMENTS
Pt c/o difficulty masticating hard, chewy solids and endorses loose dentures; Pt prefers softer foods.

## 2019-10-16 NOTE — PROGRESS NOTE ADULT - PROBLEM SELECTOR PLAN 3
Prerenal initially likely from sepsis and lasix. Now ATN.   - Increase in Cr > 30% form baseline  - FeUrea 48.2% - suggests intrinsic renal disease  - Ddx includes iatrogenic (recently started on lasix) vs infections (septic 2/2 pneumonia)  - Hold lasix  - Trend BMP - Initially prerenal due to sepsis and lasix, now ATN   - Increase in Cr > 30% form baseline  - FeUrea 48.2% - suggests intrinsic renal disease  - Ddx includes iatrogenic (recently started on lasix) vs infections (septic 2/2 pneumonia)  - Hold lasix  - Trend BMP

## 2019-10-16 NOTE — PROGRESS NOTE ADULT - ASSESSMENT
1.s/p NSTEMI  2, acute on chronic renal insufficency  3.no further chest pain  4. anemia stable  5. febrile ?pneumonia  6. hemodynamically stable    Recommend  continue metoprolol change to toprol xl 50  add cozaar 25  treat infection   ?rehab

## 2019-10-16 NOTE — PROGRESS NOTE ADULT - PROBLEM SELECTOR PLAN 2
secondary to above.  Getting LE venous dopplers to make sure DVT not contributing to fever. - Due to haemophilus influenzae  - Management as above  - ID following  - Will obtain LE venous dopplers to make sure DVT not contributing to fever - Due to haemophilus influenzae  - Management as above  - ID following

## 2019-10-16 NOTE — SWALLOW BEDSIDE ASSESSMENT ADULT - SLP PERTINENT HISTORY OF CURRENT PROBLEM
89 yo M PMHx CAD s/p stents, CEA, PVD s/p stents, dementia, GI bleed presents with concerns for NSTEMI. Chest pain resolved after arrival to the ED.  During hospitalization on 10/13 pt spiked fever to 101.8 with new leukocytosis at 11.60. CXR read as bibasilar atelectasis, pt was started on CTX/Azithromycin for possible CAP. Bcx drawn at the time returned positive for H flu. He admitted to dry cough which started on his last admission. 89 yo M PMHx CAD s/p stents, CEA, PVD s/p stents, dementia, GI bleed presents with concerns for NSTEMI. Chest pain resolved after arrival to the ED.  During hospitalization on 10/13 pt spiked fever to 101.8 with new leukocytosis at 11.60. CXR read as bibasilar atelectasis, pt was started on CTX/Azithromycin for possible CAP. Bcx drawn at the time returned positive for H flu. He was admitted with dry cough which started on his last admission.

## 2019-10-17 LAB
-  AMOXICILLIN/CLAVULANIC ACID: SIGNIFICANT CHANGE UP
-  AMPICILLIN/SULBACTAM: SIGNIFICANT CHANGE UP
-  AMPICILLIN: SIGNIFICANT CHANGE UP
-  CEFTRIAXONE: SIGNIFICANT CHANGE UP
-  CIPROFLOXACIN: SIGNIFICANT CHANGE UP
-  MEROPENEM: SIGNIFICANT CHANGE UP
ANION GAP SERPL CALC-SCNC: 14 MMOL/L — SIGNIFICANT CHANGE UP (ref 5–17)
BUN SERPL-MCNC: 33 MG/DL — HIGH (ref 7–23)
CALCIUM SERPL-MCNC: 8.6 MG/DL — SIGNIFICANT CHANGE UP (ref 8.4–10.5)
CHLORIDE SERPL-SCNC: 102 MMOL/L — SIGNIFICANT CHANGE UP (ref 96–108)
CO2 SERPL-SCNC: 19 MMOL/L — LOW (ref 22–31)
CREAT SERPL-MCNC: 1.77 MG/DL — HIGH (ref 0.5–1.3)
CULTURE RESULTS: NO GROWTH — SIGNIFICANT CHANGE UP
CULTURE RESULTS: SIGNIFICANT CHANGE UP
GLUCOSE BLDC GLUCOMTR-MCNC: 149 MG/DL — HIGH (ref 70–99)
GLUCOSE BLDC GLUCOMTR-MCNC: 160 MG/DL — HIGH (ref 70–99)
GLUCOSE BLDC GLUCOMTR-MCNC: 184 MG/DL — HIGH (ref 70–99)
GLUCOSE BLDC GLUCOMTR-MCNC: 241 MG/DL — HIGH (ref 70–99)
GLUCOSE SERPL-MCNC: 142 MG/DL — HIGH (ref 70–99)
HCT VFR BLD CALC: 28.9 % — LOW (ref 39–50)
HGB BLD-MCNC: 8.9 G/DL — LOW (ref 13–17)
MAGNESIUM SERPL-MCNC: 2.2 MG/DL — SIGNIFICANT CHANGE UP (ref 1.6–2.6)
MCHC RBC-ENTMCNC: 30.8 GM/DL — LOW (ref 32–36)
MCHC RBC-ENTMCNC: 32.6 PG — SIGNIFICANT CHANGE UP (ref 27–34)
MCV RBC AUTO: 105.9 FL — HIGH (ref 80–100)
METHOD TYPE: SIGNIFICANT CHANGE UP
ORGANISM # SPEC MICROSCOPIC CNT: SIGNIFICANT CHANGE UP
PHOSPHATE SERPL-MCNC: 3.7 MG/DL — SIGNIFICANT CHANGE UP (ref 2.5–4.5)
PLATELET # BLD AUTO: 235 K/UL — SIGNIFICANT CHANGE UP (ref 150–400)
POTASSIUM SERPL-MCNC: 5.2 MMOL/L — SIGNIFICANT CHANGE UP (ref 3.5–5.3)
POTASSIUM SERPL-SCNC: 5.2 MMOL/L — SIGNIFICANT CHANGE UP (ref 3.5–5.3)
RBC # BLD: 2.73 M/UL — LOW (ref 4.2–5.8)
RBC # BLD: 2.73 M/UL — LOW (ref 4.2–5.8)
RBC # FLD: 17.2 % — HIGH (ref 10.3–14.5)
RETICS #: 100.7 K/UL — SIGNIFICANT CHANGE UP (ref 25–125)
RETICS/RBC NFR: 3.7 % — HIGH (ref 0.5–2.5)
SODIUM SERPL-SCNC: 135 MMOL/L — SIGNIFICANT CHANGE UP (ref 135–145)
SPECIMEN SOURCE: SIGNIFICANT CHANGE UP
SPECIMEN SOURCE: SIGNIFICANT CHANGE UP
WBC # BLD: 9.87 K/UL — SIGNIFICANT CHANGE UP (ref 3.8–10.5)
WBC # FLD AUTO: 9.87 K/UL — SIGNIFICANT CHANGE UP (ref 3.8–10.5)

## 2019-10-17 PROCEDURE — 71045 X-RAY EXAM CHEST 1 VIEW: CPT | Mod: 26,76

## 2019-10-17 PROCEDURE — 99232 SBSQ HOSP IP/OBS MODERATE 35: CPT

## 2019-10-17 PROCEDURE — 99233 SBSQ HOSP IP/OBS HIGH 50: CPT | Mod: GC

## 2019-10-17 RX ORDER — FUROSEMIDE 40 MG
40 TABLET ORAL ONCE
Refills: 0 | Status: COMPLETED | OUTPATIENT
Start: 2019-10-17 | End: 2019-10-17

## 2019-10-17 RX ORDER — HEPARIN SODIUM 5000 [USP'U]/ML
5000 INJECTION INTRAVENOUS; SUBCUTANEOUS EVERY 8 HOURS
Refills: 0 | Status: DISCONTINUED | OUTPATIENT
Start: 2019-10-17 | End: 2019-10-18

## 2019-10-17 RX ADMIN — ATORVASTATIN CALCIUM 80 MILLIGRAM(S): 80 TABLET, FILM COATED ORAL at 22:23

## 2019-10-17 RX ADMIN — Medication 1000 UNIT(S): at 11:36

## 2019-10-17 RX ADMIN — Medication 100 MILLIGRAM(S): at 14:17

## 2019-10-17 RX ADMIN — Medication 2: at 12:32

## 2019-10-17 RX ADMIN — PANTOPRAZOLE SODIUM 40 MILLIGRAM(S): 20 TABLET, DELAYED RELEASE ORAL at 17:48

## 2019-10-17 RX ADMIN — HEPARIN SODIUM 5000 UNIT(S): 5000 INJECTION INTRAVENOUS; SUBCUTANEOUS at 22:23

## 2019-10-17 RX ADMIN — LOSARTAN POTASSIUM 25 MILLIGRAM(S): 100 TABLET, FILM COATED ORAL at 05:33

## 2019-10-17 RX ADMIN — INSULIN GLARGINE 18 UNIT(S): 100 INJECTION, SOLUTION SUBCUTANEOUS at 22:23

## 2019-10-17 RX ADMIN — Medication 0: at 22:24

## 2019-10-17 RX ADMIN — CLOPIDOGREL BISULFATE 75 MILLIGRAM(S): 75 TABLET, FILM COATED ORAL at 11:36

## 2019-10-17 RX ADMIN — PANTOPRAZOLE SODIUM 40 MILLIGRAM(S): 20 TABLET, DELAYED RELEASE ORAL at 05:33

## 2019-10-17 RX ADMIN — SENNA PLUS 2 TABLET(S): 8.6 TABLET ORAL at 22:23

## 2019-10-17 RX ADMIN — Medication 81 MILLIGRAM(S): at 11:36

## 2019-10-17 RX ADMIN — Medication 100 MILLIGRAM(S): at 22:23

## 2019-10-17 RX ADMIN — CEFTRIAXONE 100 MILLIGRAM(S): 500 INJECTION, POWDER, FOR SOLUTION INTRAMUSCULAR; INTRAVENOUS at 20:32

## 2019-10-17 RX ADMIN — HEPARIN SODIUM 5000 UNIT(S): 5000 INJECTION INTRAVENOUS; SUBCUTANEOUS at 14:17

## 2019-10-17 RX ADMIN — Medication 50 MILLIGRAM(S): at 05:33

## 2019-10-17 RX ADMIN — Medication 6 UNIT(S): at 17:48

## 2019-10-17 RX ADMIN — Medication 40 MILLIGRAM(S): at 12:04

## 2019-10-17 RX ADMIN — DONEPEZIL HYDROCHLORIDE 10 MILLIGRAM(S): 10 TABLET, FILM COATED ORAL at 22:23

## 2019-10-17 RX ADMIN — Medication 1: at 08:23

## 2019-10-17 RX ADMIN — Medication 6 UNIT(S): at 12:32

## 2019-10-17 RX ADMIN — Medication 100 MILLIGRAM(S): at 05:33

## 2019-10-17 RX ADMIN — Medication 6 UNIT(S): at 08:22

## 2019-10-17 RX ADMIN — Medication 1: at 17:48

## 2019-10-17 RX ADMIN — Medication 300 MILLIGRAM(S): at 22:23

## 2019-10-17 NOTE — DIETITIAN INITIAL EVALUATION ADULT. - PHYSICAL APPEARANCE
other (specify) Ht: 5'10" Wt: 157.6 pounds  BMI: 22.3 kg/m2 IBW: 166 pounds (+/-10%) 95%IBW  Edema per nursing flowsheets: 1+ R+L ankle  Skin per nursing flowsheets: tear noted; no pressure injuries

## 2019-10-17 NOTE — PROGRESS NOTE ADULT - SUBJECTIVE AND OBJECTIVE BOX
Patient is a 89y old  Male who presents with a chief complaint of NSTEMI (17 Oct 2019 08:56)      SUBJECTIVE / OVERNIGHT EVENTS:  No acute events overnight. Pt denies CP, palpitations, SOB. He complains of non-productive cough.       MEDICATIONS  (STANDING):  aspirin enteric coated 81 milliGRAM(s) Oral daily  atorvastatin 80 milliGRAM(s) Oral at bedtime  cefTRIAXone   IVPB 2000 milliGRAM(s) IV Intermittent every 24 hours  cholecalciferol 1000 Unit(s) Oral daily  clopidogrel Tablet 75 milliGRAM(s) Oral daily  dextrose 50% Injectable 12.5 Gram(s) IV Push once  dextrose 50% Injectable 25 Gram(s) IV Push once  dextrose 50% Injectable 25 Gram(s) IV Push once  docusate sodium 100 milliGRAM(s) Oral three times a day  donepezil 10 milliGRAM(s) Oral at bedtime  heparin  Injectable 5000 Unit(s) SubCutaneous every 8 hours  insulin glargine Injectable (LANTUS) 18 Unit(s) SubCutaneous at bedtime  insulin lispro (HumaLOG) corrective regimen sliding scale   SubCutaneous at bedtime  insulin lispro (HumaLOG) corrective regimen sliding scale   SubCutaneous three times a day before meals  insulin lispro Injectable (HumaLOG) 6 Unit(s) SubCutaneous three times a day before meals  losartan 25 milliGRAM(s) Oral daily  metoprolol succinate ER 50 milliGRAM(s) Oral daily  pantoprazole    Tablet 40 milliGRAM(s) Oral two times a day  phenytoin   Capsule 300 milliGRAM(s) Oral at bedtime  polyethylene glycol 3350 17 Gram(s) Oral daily  senna 2 Tablet(s) Oral at bedtime    MEDICATIONS  (PRN):  dextrose 40% Gel 15 Gram(s) Oral once PRN Blood Glucose LESS THAN 70 milliGRAM(s)/deciliter  glucagon  Injectable 1 milliGRAM(s) IntraMuscular once PRN Glucose LESS THAN 70 milligrams/deciliter  ondansetron Injectable 4 milliGRAM(s) IV Push every 8 hours PRN Nausea and/or Vomiting  petrolatum white Ointment 1 Application(s) Topical three times a day PRN Nasal dryness      Vital Signs Last 24 Hrs  T(C): 36.9 (17 Oct 2019 05:30), Max: 37.3 (16 Oct 2019 13:04)  T(F): 98.4 (17 Oct 2019 05:30), Max: 99.1 (16 Oct 2019 13:04)  HR: 76 (17 Oct 2019 05:30) (76 - 83)  BP: 138/66 (17 Oct 2019 05:30) (103/55 - 138/66)  BP(mean): --  RR: 18 (17 Oct 2019 05:30) (17 - 18)  SpO2: 91% (17 Oct 2019 05:30) (91% - 94%)    CAPILLARY BLOOD GLUCOSE      POCT Blood Glucose.: 184 mg/dL (17 Oct 2019 08:01)  POCT Blood Glucose.: 149 mg/dL (16 Oct 2019 22:56)  POCT Blood Glucose.: 148 mg/dL (16 Oct 2019 21:43)  POCT Blood Glucose.: 173 mg/dL (16 Oct 2019 17:14)  POCT Blood Glucose.: 284 mg/dL (16 Oct 2019 11:42)    I&O's Summary    16 Oct 2019 07:  -  17 Oct 2019 07:00  --------------------------------------------------------  IN: 240 mL / OUT: 900 mL / NET: -660 mL    17 Oct 2019 07:01  -  17 Oct 2019 09:15  --------------------------------------------------------  IN: 240 mL / OUT: 0 mL / NET: 240 mL        CONSTITUTIONAL:   No acute distress. Awake and alert.  HEAD: No evidence of trauma. Structures WNL.  ENT: Dry oral mucosa. No abnormal lesions. No erythema. No pharyngeal exudates.   NECK: Supple.   RESPIRATORY: decreased BS at R base. No wheezes, rales, or rhonchi. No accessory muscle use. No apparent respiratory distress.  CARDIOVASCULAR: +S1/S2. No audible S3/S4.  GASTROINTESTINAL: Soft, nontender, nondistended. +BS. No rebound or guarding.   BACK: No spinal or paraspinal tenderness. No CVA tenderness.  VASCULAR: 2+ radial pulses x b/l UE; 2+ DP pulses x b/l LE.  MUSCULOSKELETAL: Movement in all limbs. No neck stiffness.   DERMATOLOGICAL: No abnormal rashes or lesions. No evident hair, skin, or nail changes.  NEUROLOGICAL: CN 2-12 grossly intact. No focal deficits. Sensation intact x 4EXT. A&Ox3 (oriented to person, place, and time).  LABS:                        8.9    9.87  )-----------( 235      ( 17 Oct 2019 07:41 )             28.9     Auto Eosinophil # x     / Auto Eosinophil % x     / Auto Neutrophil # x     / Auto Neutrophil % x     / BANDS % x                            8.4    10.12 )-----------( 241      ( 16 Oct 2019 14:09 )             27.4     Auto Eosinophil # x     / Auto Eosinophil % x     / Auto Neutrophil # x     / Auto Neutrophil % x     / BANDS % x                            7.8    8.39  )-----------( 206      ( 16 Oct 2019 08:11 )             25.4     Auto Eosinophil # x     / Auto Eosinophil % x     / Auto Neutrophil # x     / Auto Neutrophil % x     / BANDS % x        10    135  |  102  |  33<H>  ----------------------------<  142<H>  5.2   |  19<L>  |  1.77<H>  10-16    137  |  103  |  32<H>  ----------------------------<  233<H>  4.9   |  23  |  1.65<H>    Ca    8.6      17 Oct 2019 06:22  Mg     2.2     10-17  Phos  3.7     10-17          Urinalysis Basic - ( 16 Oct 2019 06:11 )    Color: Light Yellow / Appearance: Clear / S.016 / pH: x  Gluc: x / Ketone: Negative  / Bili: Negative / Urobili: <2 mg/dL   Blood: x / Protein: 30 mg/dL / Nitrite: Negative   Leuk Esterase: Negative / RBC: 2 /HPF / WBC 1 /HPF   Sq Epi: x / Non Sq Epi: 1 /HPF / Bacteria: Negative          RESPIRATORY  VENT:    ABG:     VBG:     RADIOLOGY & ADDITIONAL TESTS:  (Imaging Personally Reviewed) CXR    Consultant(s) Notes Reviewed:  cards    Care Discussed with Consultants/Other Providers: RIKI Patient is a 89y old  Male who presents with a chief complaint of NSTEMI (17 Oct 2019 08:56)      SUBJECTIVE / OVERNIGHT EVENTS:  No acute events overnight. Pt denies CP, palpitations, SOB. He complains of non-productive cough.   NO more nose bleeding. No LE pain.      CONSTITUTIONAL: No weakness. No fatigue. No fever.  HEAD: No head trauma.   EYES: No vision changes.  ENT: Dried blood in nares b/l.   NECK: No neck pain or stiffness. No lumps.  RESPIRATORY: No cough. No SOB. No wheezing. No hemoptysis.   CARDIOVASCULAR: No chest pain. No palpitations.   GASTROINTESTINAL: No dysphagia. No ABD pain. No distension. No constipation. No diarrhea. No pain with defecation. No hematemesis. No hematochezia or melena.  BACK: No back pain.  GENITOURINARY: No dysuria. No frequency or urgency. No hesitancy. No incontinence. No urinary retention. No suprapubic pain. No hematuria.  EXTREMITY: No swelling.  MUSCULOSKELETAL: No joint pain or swelling. No fractures. No stiffness.    SKIN: No rashes. No itching. No skin, hair, or nail changes.  NEUROLOGICAL: No weakness or paralysis. No lightheadedness or dizziness. No HA. No numbness or tingling.   PSYCHIATRIC: No depression.         MEDICATIONS  (STANDING):  aspirin enteric coated 81 milliGRAM(s) Oral daily  atorvastatin 80 milliGRAM(s) Oral at bedtime  cefTRIAXone   IVPB 2000 milliGRAM(s) IV Intermittent every 24 hours  cholecalciferol 1000 Unit(s) Oral daily  clopidogrel Tablet 75 milliGRAM(s) Oral daily  dextrose 50% Injectable 12.5 Gram(s) IV Push once  dextrose 50% Injectable 25 Gram(s) IV Push once  dextrose 50% Injectable 25 Gram(s) IV Push once  docusate sodium 100 milliGRAM(s) Oral three times a day  donepezil 10 milliGRAM(s) Oral at bedtime  heparin  Injectable 5000 Unit(s) SubCutaneous every 8 hours  insulin glargine Injectable (LANTUS) 18 Unit(s) SubCutaneous at bedtime  insulin lispro (HumaLOG) corrective regimen sliding scale   SubCutaneous at bedtime  insulin lispro (HumaLOG) corrective regimen sliding scale   SubCutaneous three times a day before meals  insulin lispro Injectable (HumaLOG) 6 Unit(s) SubCutaneous three times a day before meals  losartan 25 milliGRAM(s) Oral daily  metoprolol succinate ER 50 milliGRAM(s) Oral daily  pantoprazole    Tablet 40 milliGRAM(s) Oral two times a day  phenytoin   Capsule 300 milliGRAM(s) Oral at bedtime  polyethylene glycol 3350 17 Gram(s) Oral daily  senna 2 Tablet(s) Oral at bedtime    MEDICATIONS  (PRN):  dextrose 40% Gel 15 Gram(s) Oral once PRN Blood Glucose LESS THAN 70 milliGRAM(s)/deciliter  glucagon  Injectable 1 milliGRAM(s) IntraMuscular once PRN Glucose LESS THAN 70 milligrams/deciliter  ondansetron Injectable 4 milliGRAM(s) IV Push every 8 hours PRN Nausea and/or Vomiting  petrolatum white Ointment 1 Application(s) Topical three times a day PRN Nasal dryness      Vital Signs Last 24 Hrs  T(C): 36.9 (17 Oct 2019 05:30), Max: 37.3 (16 Oct 2019 13:04)  T(F): 98.4 (17 Oct 2019 05:30), Max: 99.1 (16 Oct 2019 13:04)  HR: 76 (17 Oct 2019 05:30) (76 - 83)  BP: 138/66 (17 Oct 2019 05:30) (103/55 - 138/66)  BP(mean): --  RR: 18 (17 Oct 2019 05:30) (17 - 18)  SpO2: 91% (17 Oct 2019 05:30) (91% - 94%)    CAPILLARY BLOOD GLUCOSE      POCT Blood Glucose.: 184 mg/dL (17 Oct 2019 08:01)  POCT Blood Glucose.: 149 mg/dL (16 Oct 2019 22:56)  POCT Blood Glucose.: 148 mg/dL (16 Oct 2019 21:43)  POCT Blood Glucose.: 173 mg/dL (16 Oct 2019 17:14)  POCT Blood Glucose.: 284 mg/dL (16 Oct 2019 11:42)    I&O's Summary    16 Oct 2019 07:01  -  17 Oct 2019 07:00  --------------------------------------------------------  IN: 240 mL / OUT: 900 mL / NET: -660 mL    17 Oct 2019 07:01  -  17 Oct 2019 09:15  --------------------------------------------------------  IN: 240 mL / OUT: 0 mL / NET: 240 mL      CONSTITUTIONAL: No acute distress. Awake and alert.  HEAD: No evidence of trauma. Structures WNL.  EYES: +PERRL. Slight conjunctival pallor.  ENT: Dry oral mucosa. No abnormal lesions. No erythema. No pharyngeal exudates.   NECK: Supple.   RESPIRATORY:  Bibasilar crackles; decreased breath sounds on RLL. Good air movement.  No wheezes or rhonchi. No accessory muscle use. No apparent respiratory distress.  CARDIOVASCULAR: +S1/S2. No audible S3/S4. Extremities warm and well perfused. 1+ LE edema which is equal. No toe ulcers. No venous ulcers.   GASTROINTESTINAL: Soft, nontender, nondistended. +BS. No rebound or guarding.   BACK: No spinal or paraspinal tenderness. No CVA tenderness.  VASCULAR: 2+ radial pulses x b/l UE; 2+ DP pulses x b/l LE.  MUSCULOSKELETAL: Movement in all limbs. No neck stiffness.   DERMATOLOGICAL: No abnormal rashes or lesions. No evident hair, skin, or nail changes.  NEUROLOGICAL: CN 2-12 grossly intact. No focal deficits. Sensation intact x 4EXT. A&Ox3 (oriented to person, place, and time).    LABS:                        8.9    9.87  )-----------( 235      ( 17 Oct 2019 07:41 )             28.9     Auto Eosinophil # x     / Auto Eosinophil % x     / Auto Neutrophil # x     / Auto Neutrophil % x     / BANDS % x                            8.4    10.12 )-----------( 241      ( 16 Oct 2019 14:09 )             27.4     Auto Eosinophil # x     / Auto Eosinophil % x     / Auto Neutrophil # x     / Auto Neutrophil % x     / BANDS % x                            7.8    8.39  )-----------( 206      ( 16 Oct 2019 08:11 )             25.4     Auto Eosinophil # x     / Auto Eosinophil % x     / Auto Neutrophil # x     / Auto Neutrophil % x     / BANDS % x        10-17    135  |  102  |  33<H>  ----------------------------<  142<H>  5.2   |  19<L>  |  1.77<H>  10-16    137  |  103  |  32<H>  ----------------------------<  233<H>  4.9   |  23  |  1.65<H>    Ca    8.6      17 Oct 2019 06:22  Mg     2.2     10-17  Phos  3.7     10-17          Urinalysis Basic - ( 16 Oct 2019 06:11 )    Color: Light Yellow / Appearance: Clear / S.016 / pH: x  Gluc: x / Ketone: Negative  / Bili: Negative / Urobili: <2 mg/dL   Blood: x / Protein: 30 mg/dL / Nitrite: Negative   Leuk Esterase: Negative / RBC: 2 /HPF / WBC 1 /HPF   Sq Epi: x / Non Sq Epi: 1 /HPF / Bacteria: Negative          RESPIRATORY  VENT:    ABG:     VBG:     RADIOLOGY & ADDITIONAL TESTS:  (Imaging Personally Reviewed) CXR    Consultant(s) Notes Reviewed:  cards    Care Discussed with Consultants/Other Providers: RIKI

## 2019-10-17 NOTE — PROGRESS NOTE ADULT - SUBJECTIVE AND OBJECTIVE BOX
CC: F/U for bacteremia    Saw/spoke to patient. No fevers, no chills. No new complaints. Unchanged.    Allergies  No Known Allergies    ANTIMICROBIALS:  cefTRIAXone   IVPB 2000 every 24 hours    PE:    Vital Signs Last 24 Hrs  T(C): 36.9 (17 Oct 2019 05:30), Max: 37.1 (16 Oct 2019 20:22)  T(F): 98.4 (17 Oct 2019 05:30), Max: 98.7 (16 Oct 2019 20:22)  HR: 83 (17 Oct 2019 12:05) (76 - 83)  BP: 108/58 (17 Oct 2019 12:05) (108/58 - 138/66)  RR: 18 (17 Oct 2019 05:30) (17 - 18)  SpO2: 91% (17 Oct 2019 05:30) (91% - 94%)    Gen: AOx3, NAD, non-toxic, pleasant, OOB in chair  CV: S1+S2 normal, nontachycardic  Resp: Clear bilat, no resp distress, no crackles/wheezes  Abd: Soft, nontender, +BS  Ext: No LE edema, no wounds    LABS:                        8.9    9.87  )-----------( 235      ( 17 Oct 2019 07:41 )             28.9     10-17    135  |  102  |  33<H>  ----------------------------<  142<H>  5.2   |  19<L>  |  1.77<H>    Ca    8.6      17 Oct 2019 06:22  Phos  3.7     10-  Mg     2.2     10-17    Urinalysis Basic - ( 16 Oct 2019 06:11 )    Color: Light Yellow / Appearance: Clear / S.016 / pH: x  Gluc: x / Ketone: Negative  / Bili: Negative / Urobili: <2 mg/dL   Blood: x / Protein: 30 mg/dL / Nitrite: Negative   Leuk Esterase: Negative / RBC: 2 /HPF / WBC 1 /HPF   Sq Epi: x / Non Sq Epi: 1 /HPF / Bacteria: Negative    MICROBIOLOGY:    .Blood  10-16-19   No growth to date.    .Urine  10-16-19   No growth     .Blood  10-14-19   No growth to date.  --  Blood Culture PCR  Haemophilus influenzae    Rapid RVP Result: NotDetec (10-14 @ 10:25)    (otherwise reviewed)    RADIOLOGY:    10/17 CXR:    FINDINGS:    Lines/Tubes: None    Heart and mediastinum:  Unchanged.    Lungs, pleura, and airways: Unchanged bilateral effusions with bibasilar   atelectasis.    Bones and soft tissues: The bones and soft tissues are unchanged.    Impression:    Unchanged bilateral effusions with bibasilar atelectasis

## 2019-10-17 NOTE — PROGRESS NOTE ADULT - PROBLEM SELECTOR PLAN 4
- JACQUELINE in anterior leads, STD in precordial leads, diffuse on repeat EKG  - Troponin 1163 --> ST Depression MI  - Appreciate cardiology and CCU recommendations  - Given recent history of GIB will management conservatively without heparin gtt  - Trops peaked  - ASA 81 daily  - Plavix 75 mg daily   - C/w Toprol 50  - Per cardiology, started Cozaar 25; c/w toprol XL 50  - TTE 2D - Moderate segmental LV systolic dysfunction  - Primary Cardiologist Dr. Holman following

## 2019-10-17 NOTE — DIETITIAN INITIAL EVALUATION ADULT. - PROBLEM SELECTOR PLAN 1
JACQUELINE in anterior leads, STD in precordial leads, appears to be diffuse on repeat EKG here. Troponin 1163. ST depression MI. Appreciate cardiology and CCU recommendations.   - Given recent history of GIB will management conservatively without heparin gtt  - will restart ASA at lower dose - 81mg po daily   - c/w plavix 75 mg po daily   - c/w metoprolol tartrate 25mg po BID  - TTE  - further care per primary Cardiologist Dr. Holman

## 2019-10-17 NOTE — PROGRESS NOTE ADULT - PROBLEM SELECTOR PLAN 8
- Home dependent Lantus 22U QHS, 10U premeal  - Most recent admission was Lantus 14U QHS, and 6U premeal  -c/w lantus 18 and humalog 6 pre-meal

## 2019-10-17 NOTE — PROGRESS NOTE ADULT - PROBLEM SELECTOR PLAN 3
- Initially prerenal due to sepsis and lasix, now ATN   - Increase in Cr > 30% form baseline  - FeUrea 48.2% - suggests intrinsic renal disease  - Ddx includes iatrogenic (recently started on lasix) vs infections (septic 2/2 pneumonia)  - Trend BMP; creat downtrending

## 2019-10-17 NOTE — PROGRESS NOTE ADULT - ASSESSMENT
1.s/p NSTEMI cardiac stable no overt CHF or angina  2, acute on chronic renal insufficency  3.no further chest pain  4. anemia stable  5. RLL rales-?pneumonia on antibiotics  6. hemodynamically stable    Recommend  toprol XL 50   cozaar 25  treat infection  repeat CXR   discharge planning rehab    addendum xray new bilateral effusions- may need low dose lasix but has renal insufficiency

## 2019-10-17 NOTE — DIETITIAN INITIAL EVALUATION ADULT. - PERTINENT LABORATORY DATA
10-17 Na135 mmol/L Glu 142 mg/dL<H> K+ 5.2 mmol/L Cr  1.77 mg/dL<H> BUN 33 mg/dL<H> Phos 3.7 mg/dL Alb n/a   PAB n/a

## 2019-10-17 NOTE — PROGRESS NOTE ADULT - PROBLEM SELECTOR PLAN 6
- CAD s/p stents, remote hx of MI  - ASA held last admission 2/2 GIB  - Presents with NSTEMI  - c/w ASA, Plavix  - Atorvastatin 80mg QHS  - toprol 50

## 2019-10-17 NOTE — PROGRESS NOTE ADULT - SUBJECTIVE AND OBJECTIVE BOX
Subjective: Patient seen and examined. No new events except as noted.   still c/o dry cough   no cp or sob  good appetite  toprol XL 50 and cozaar 25 started  MEDICATIONS:  MEDICATIONS  (STANDING):  aspirin enteric coated 81 milliGRAM(s) Oral daily  atorvastatin 80 milliGRAM(s) Oral at bedtime  cefTRIAXone   IVPB 2000 milliGRAM(s) IV Intermittent every 24 hours  cholecalciferol 1000 Unit(s) Oral daily  clopidogrel Tablet 75 milliGRAM(s) Oral daily  dextrose 50% Injectable 12.5 Gram(s) IV Push once  dextrose 50% Injectable 25 Gram(s) IV Push once  dextrose 50% Injectable 25 Gram(s) IV Push once  docusate sodium 100 milliGRAM(s) Oral three times a day  donepezil 10 milliGRAM(s) Oral at bedtime  heparin  Injectable 5000 Unit(s) SubCutaneous every 8 hours  insulin glargine Injectable (LANTUS) 18 Unit(s) SubCutaneous at bedtime  insulin lispro (HumaLOG) corrective regimen sliding scale   SubCutaneous at bedtime  insulin lispro (HumaLOG) corrective regimen sliding scale   SubCutaneous three times a day before meals  insulin lispro Injectable (HumaLOG) 6 Unit(s) SubCutaneous three times a day before meals  losartan 25 milliGRAM(s) Oral daily  metoprolol succinate ER 50 milliGRAM(s) Oral daily  pantoprazole    Tablet 40 milliGRAM(s) Oral two times a day  phenytoin   Capsule 300 milliGRAM(s) Oral at bedtime  polyethylene glycol 3350 17 Gram(s) Oral daily  senna 2 Tablet(s) Oral at bedtime      PHYSICAL EXAM:  T(C): 36.9 (10-17-19 @ 05:30), Max: 37.3 (10-16-19 @ 13:04)  HR: 76 (10-17-19 @ 05:30) (76 - 83)  BP: 138/66 (10-17-19 @ 05:30) (103/55 - 138/66)  RR: 18 (10-17-19 @ 05:30) (17 - 18)  SpO2: 91% (10-17-19 @ 05:30) (91% - 94%)  Wt(kg): --  I&O's Summary    16 Oct 2019 07:01  -  17 Oct 2019 07:00  --------------------------------------------------------  IN: 240 mL / OUT: 900 mL / NET: -660 mL          Appearance: Normal awake alert NAD	  HEENT:   Normal oral mucosa, PERRL, EOMI	  Cardiovascular: Normal S1 S2, No JVD, No murmurs ,  Respiratory: Lungs rales couse 1/3 up on right left clear	  Gastrointestinal:  Soft, Non-tender, + BS	  Skin: No rashes, No ecchymoses, No cyanosis, warm to touch  Musculoskeletal: Normal range of motion, normal strength  Psychiatry:  Mood & affect appropriate  Ext: cool lower extremities trace edema  Peripheral pulses palpable 2+ bilaterally      LABS:    CARDIAC MARKERS:                                8.9    9.87  )-----------( 235      ( 17 Oct 2019 07:41 )             28.9     10-17    135  |  102  |  33<H>  ----------------------------<  142<H>  5.2   |  19<L>  |  1.77<H>    Ca    8.6      17 Oct 2019 06:22  Phos  3.7     10-17  Mg     2.2     10-17      proBNP:   Lipid Profile:   HgA1c:   TSH:           TELEMETRY: 	    ECG:  	  RADIOLOGY:   DIAGNOSTIC TESTING:  [ ] Echocardiogram:  [ ]  Catheterization:  [ ] Stress Test:    OTHER: 	      < from: Xray Chest 1 View- PORTABLE-Routine (10.16.19 @ 08:33) >  FINDINGS:  Interval new bilateral pleural effusions. No pneumothorax.  Cardiac size cannot accurately be assessed in this projection.     IMPRESSION: Interval new bilateral pleural effusions.       < end of copied text >

## 2019-10-17 NOTE — DIETITIAN INITIAL EVALUATION ADULT. - ADD RECOMMEND
1. Continue Soft, Low Sodium diet as ordered - With DM (89 y/o) A1c WNL for age, if glucose and FS continue to fall above optimal range, may consider Consistent Carbohydrate diet. Potass & Phos WNL, monitor for elevated levels and consider restriction if fall out of optimal range. 2. Provided DM management Ed and Low sodium Ed - additional education available by RD PRN. 3. Monitor PO intake/tolerance, weights, labs, skin integrity, bowels.

## 2019-10-17 NOTE — PROGRESS NOTE ADULT - ASSESSMENT
89 yo M PMHx CAD s/p stents, CEA, PVD s/p stents, dementia, GI bleed presents sent in by Cardiologist for management of NSTEMI. Patient clinically stable, being followed by outpatient Cardiologist Dr. Holman. Newly febrile with elevated WBC, blood cultures drawn - Bcx positive for H. influenzae, on Abx. 89 yo M PMHx CAD s/p stents, CEA, PVD s/p stents, dementia, GI bleed presents sent in by Cardiologist for management of NSTEMI. Patient clinically stable, being followed by outpatient Cardiologist Dr. Holman. Newly febrile with elevated WBC, blood cultures drawn - Bcx positive for H. influenzae, on ceftriaxone

## 2019-10-17 NOTE — DIETITIAN INITIAL EVALUATION ADULT. - OTHER INFO
"89 yo M PMHx CAD s/p stents, CEA, PVD s/p stents, dementia, GI bleed presents sent in by Cardiologist for management of NSTEMI. Patient clinically stable, being followed by outpatient Cardiologist Dr. Holman. Newly febrile with elevated WBC, blood cultures drawn - Bcx positive for H. influenzae, on Abx."    Pt notes good PO intake in house and PTA. He admits to 1 day of decreased appetite in-house, however that has subsided and notes eating well at meals. Admits to no great weight fluctuations recently, stating his weight is always in the 150's, reflective of admission weight of 157.6 pounds. No overt signs of wasting noted. Denies N+V, admits to slight diarrhea after being constipated upon admit and receiving bowel regimen. Denies chewing/swallowing difficulties. NKFA. Multivitamin supplementation at home.  Pt noted with CKD Stage 3 - potassium and phosphorous levels WNL, no restriction needed as of now, will continue to monitor.    Pt with Hx of type 2 DM, A1c 6.7% (10/7) - pt noted to be 89 y/o. Glucose and POCT BG levels elevated, however don't believe it is necessary at this time to restrict diet. Discussed consistent CHO diet education.  Reviewed foods containing CHO, portion sizes of CHO, CHO counting, pairing CHO with proteins, reading food labels, monitoring blood glucose levels, not skipping meals. Pt notes he tries to maintain a relatively consistent carbohydrate diet at home. Has Aide who helps with meal preparation. Also notes he checks his blood sugars 3x's daily and takes oral glucose lowering medication along with insulin at home. Also discussed foods high in sodium, foods low in sodium, avoiding processed foods and use of table salt. Reviewed increased intake of fresh/frozen fruits and vegetables, and sodium free alternatives to flavoring food. Discussed nutrition label reading and how to assess labels for sodium content.

## 2019-10-17 NOTE — PROGRESS NOTE ADULT - PROBLEM SELECTOR PLAN 5
- Recently admitted for workup of melena concerning for UGIB; c/w ASA, plavix. Hgb stable so no intervention. Reports dark stools  - Received s/p 1U PRBC in ED  - GI consulted - no plan for endoscopy  - CBC Q12 for now, VSQ4hr  - Transfuse prn Hgb<8  - Protonix IV 40mg BID - Recently admitted for workup of melena concerning for UGIB; c/w ASA, plavix. Hgb stable so no intervention. Reports dark stools  - Received s/p 1U PRBC in ED  - GI consulted - no plan for endoscopy  - CBC Q12 for now, VSQ4hr  - Transfuse prn Hgb<8  - Protonix PO 40mg BID - Recently admitted for workup of melena concerning for UGIB; c/w ASA, plavix. Hgb stable so no intervention. Reports dark stools  - Received s/p 1U PRBC in ED  - GI consulted - no plan for endoscopy  - Monitor cbc. VSQ4hr  - Transfuse prn Hgb<8  - Protonix PO 40mg BID

## 2019-10-17 NOTE — PROGRESS NOTE ADULT - PROBLEM SELECTOR PLAN 1
- Now septic (positive blood cultures)  - Urine legionella negative  - F/u repeat Bcx  - Per ID, will continue ceftriaxone 2g daily(5 days of IV Abx before transitioning to PO B-lactam)

## 2019-10-17 NOTE — PROGRESS NOTE ADULT - ASSESSMENT
89 yo M PMHx CAD s/p stents, CEA, PVD s/p stents, dementia, GI bleed presents sent in by Cardiologist for management of chest pain and EKG changes.  No leukocytosis, no fever  BCX with H influenzae pan S  CXR atelectasis  Likely respiratory source for H influenzae  Remains well, no fevers, no chills, repeat BCXs clear  Overall, Bacteremia, respiratory tract infection, leukocytosis  - Ceftriaxone 2g q 24  - F/U pending BCXs  - Trend WBC  - Monitor for further symptoms/resp status; monitor mental status  - Plan to complete treatment with PO B-lactam, but would try to get 5 days of IV abx completed before transitioning    Dieter Ford MD  Pager [phone]  After 5pm and on weekends call [phone]

## 2019-10-18 DIAGNOSIS — I50.9 HEART FAILURE, UNSPECIFIED: ICD-10-CM

## 2019-10-18 LAB
ALBUMIN SERPL ELPH-MCNC: 2.9 G/DL — LOW (ref 3.3–5)
ALP SERPL-CCNC: 137 U/L — HIGH (ref 40–120)
ALT FLD-CCNC: 43 U/L — SIGNIFICANT CHANGE UP (ref 10–45)
ANION GAP SERPL CALC-SCNC: 15 MMOL/L — SIGNIFICANT CHANGE UP (ref 5–17)
AST SERPL-CCNC: 50 U/L — HIGH (ref 10–40)
BILIRUB SERPL-MCNC: 0.1 MG/DL — LOW (ref 0.2–1.2)
BUN SERPL-MCNC: 35 MG/DL — HIGH (ref 7–23)
CALCIUM SERPL-MCNC: 8.5 MG/DL — SIGNIFICANT CHANGE UP (ref 8.4–10.5)
CHLORIDE SERPL-SCNC: 102 MMOL/L — SIGNIFICANT CHANGE UP (ref 96–108)
CO2 SERPL-SCNC: 24 MMOL/L — SIGNIFICANT CHANGE UP (ref 22–31)
CREAT SERPL-MCNC: 1.8 MG/DL — HIGH (ref 0.5–1.3)
GLUCOSE BLDC GLUCOMTR-MCNC: 101 MG/DL — HIGH (ref 70–99)
GLUCOSE BLDC GLUCOMTR-MCNC: 155 MG/DL — HIGH (ref 70–99)
GLUCOSE BLDC GLUCOMTR-MCNC: 216 MG/DL — HIGH (ref 70–99)
GLUCOSE BLDC GLUCOMTR-MCNC: 236 MG/DL — HIGH (ref 70–99)
GLUCOSE SERPL-MCNC: 69 MG/DL — LOW (ref 70–99)
HCT VFR BLD CALC: 26.5 % — LOW (ref 39–50)
HGB BLD-MCNC: 8 G/DL — LOW (ref 13–17)
MAGNESIUM SERPL-MCNC: 2.1 MG/DL — SIGNIFICANT CHANGE UP (ref 1.6–2.6)
MCHC RBC-ENTMCNC: 30.2 GM/DL — LOW (ref 32–36)
MCHC RBC-ENTMCNC: 31.9 PG — SIGNIFICANT CHANGE UP (ref 27–34)
MCV RBC AUTO: 105.6 FL — HIGH (ref 80–100)
PHOSPHATE SERPL-MCNC: 3.9 MG/DL — SIGNIFICANT CHANGE UP (ref 2.5–4.5)
PLATELET # BLD AUTO: 247 K/UL — SIGNIFICANT CHANGE UP (ref 150–400)
POTASSIUM SERPL-MCNC: 4.6 MMOL/L — SIGNIFICANT CHANGE UP (ref 3.5–5.3)
POTASSIUM SERPL-SCNC: 4.6 MMOL/L — SIGNIFICANT CHANGE UP (ref 3.5–5.3)
PROT SERPL-MCNC: 6 G/DL — SIGNIFICANT CHANGE UP (ref 6–8.3)
RBC # BLD: 2.51 M/UL — LOW (ref 4.2–5.8)
RBC # FLD: 16.9 % — HIGH (ref 10.3–14.5)
SODIUM SERPL-SCNC: 141 MMOL/L — SIGNIFICANT CHANGE UP (ref 135–145)
WBC # BLD: 8.49 K/UL — SIGNIFICANT CHANGE UP (ref 3.8–10.5)
WBC # FLD AUTO: 8.49 K/UL — SIGNIFICANT CHANGE UP (ref 3.8–10.5)

## 2019-10-18 PROCEDURE — 99233 SBSQ HOSP IP/OBS HIGH 50: CPT | Mod: GC

## 2019-10-18 PROCEDURE — 99232 SBSQ HOSP IP/OBS MODERATE 35: CPT

## 2019-10-18 PROCEDURE — 71045 X-RAY EXAM CHEST 1 VIEW: CPT | Mod: 26

## 2019-10-18 RX ORDER — INSULIN GLARGINE 100 [IU]/ML
12 INJECTION, SOLUTION SUBCUTANEOUS AT BEDTIME
Refills: 0 | Status: DISCONTINUED | OUTPATIENT
Start: 2019-10-18 | End: 2019-10-22

## 2019-10-18 RX ORDER — FUROSEMIDE 40 MG
80 TABLET ORAL ONCE
Refills: 0 | Status: COMPLETED | OUTPATIENT
Start: 2019-10-18 | End: 2019-10-18

## 2019-10-18 RX ORDER — FUROSEMIDE 40 MG
20 TABLET ORAL ONCE
Refills: 0 | Status: COMPLETED | OUTPATIENT
Start: 2019-10-18 | End: 2019-10-18

## 2019-10-18 RX ADMIN — Medication 20 MILLIGRAM(S): at 23:37

## 2019-10-18 RX ADMIN — HEPARIN SODIUM 5000 UNIT(S): 5000 INJECTION INTRAVENOUS; SUBCUTANEOUS at 06:11

## 2019-10-18 RX ADMIN — Medication 6 UNIT(S): at 15:04

## 2019-10-18 RX ADMIN — Medication 100 MILLIGRAM(S): at 21:33

## 2019-10-18 RX ADMIN — Medication 2: at 18:37

## 2019-10-18 RX ADMIN — Medication 100 MILLIGRAM(S): at 06:11

## 2019-10-18 RX ADMIN — Medication 81 MILLIGRAM(S): at 12:29

## 2019-10-18 RX ADMIN — Medication 300 MILLIGRAM(S): at 21:33

## 2019-10-18 RX ADMIN — Medication 6 UNIT(S): at 18:36

## 2019-10-18 RX ADMIN — Medication 80 MILLIGRAM(S): at 15:05

## 2019-10-18 RX ADMIN — SENNA PLUS 2 TABLET(S): 8.6 TABLET ORAL at 21:33

## 2019-10-18 RX ADMIN — Medication 0: at 21:33

## 2019-10-18 RX ADMIN — Medication 1000 UNIT(S): at 12:29

## 2019-10-18 RX ADMIN — Medication 2: at 15:04

## 2019-10-18 RX ADMIN — CLOPIDOGREL BISULFATE 75 MILLIGRAM(S): 75 TABLET, FILM COATED ORAL at 12:29

## 2019-10-18 RX ADMIN — PANTOPRAZOLE SODIUM 40 MILLIGRAM(S): 20 TABLET, DELAYED RELEASE ORAL at 06:11

## 2019-10-18 RX ADMIN — DONEPEZIL HYDROCHLORIDE 10 MILLIGRAM(S): 10 TABLET, FILM COATED ORAL at 21:48

## 2019-10-18 RX ADMIN — Medication 6 UNIT(S): at 08:08

## 2019-10-18 RX ADMIN — LOSARTAN POTASSIUM 25 MILLIGRAM(S): 100 TABLET, FILM COATED ORAL at 06:11

## 2019-10-18 RX ADMIN — Medication 50 MILLIGRAM(S): at 06:11

## 2019-10-18 RX ADMIN — ATORVASTATIN CALCIUM 80 MILLIGRAM(S): 80 TABLET, FILM COATED ORAL at 21:33

## 2019-10-18 RX ADMIN — CEFTRIAXONE 100 MILLIGRAM(S): 500 INJECTION, POWDER, FOR SOLUTION INTRAMUSCULAR; INTRAVENOUS at 17:53

## 2019-10-18 RX ADMIN — INSULIN GLARGINE 12 UNIT(S): 100 INJECTION, SOLUTION SUBCUTANEOUS at 21:48

## 2019-10-18 RX ADMIN — PANTOPRAZOLE SODIUM 40 MILLIGRAM(S): 20 TABLET, DELAYED RELEASE ORAL at 17:53

## 2019-10-18 NOTE — PROGRESS NOTE ADULT - PROBLEM SELECTOR PLAN 4
- JACQUELINE in anterior leads, STD in precordial leads, diffuse on repeat EKG  - Troponin 1163 --> ST Depression MI  - Appreciate cardiology and CCU recommendations  - Given recent history of GIB will management conservatively without heparin gtt  - Trops peaked  - ASA 81 daily  - Plavix 75 mg daily   - C/w Toprol 50  - Per cardiology, started Cozaar 25; c/w toprol XL 50  - TTE 2D - Moderate segmental LV systolic dysfunction  - Primary Cardiologist Dr. Holman following - Initially prerenal due to sepsis and lasix, now ATN   - Increase in Cr > 30% form baseline  - FeUrea 48.2% - suggests intrinsic renal disease  - Ddx includes iatrogenic (recently started on lasix) vs infections (septic 2/2 pneumonia)  - Trend BMP; Cr stable - Initially prerenal due to sepsis and lasix, now ATN   - Increase in Cr > 30% from baseline  - FeUrea 48.2% - suggests intrinsic renal disease  - Ddx includes iatrogenic (recently started on lasix) vs infections (septic 2/2 pneumonia)  - Trend BMP for further increase in Cr - Initially prerenal due to sepsis and lasix, now ATN   - Increase in Cr > 30% from baseline  - FeUrea 48.2% - suggests intrinsic renal disease  - Ddx includes iatrogenic (recently started on lasix) vs infections (septic 2/2 pneumonia)  - Uptrending Cr; Cozaar held  - Trend BMP for further increase in Cr

## 2019-10-18 NOTE — PROGRESS NOTE ADULT - PROBLEM SELECTOR PLAN 8
- Home dependent Lantus 22U QHS, 10U premeal  - Most recent admission was Lantus 14U QHS, and 6U premeal  -c/w lantus 18 and humalog 6 pre-meal - Home dependent Lantus 22U QHS, 10U premeal  - Most recent admission was Lantus 14U QHS, and 6U premeal  - C/w lantus 18 and humalog 6 pre-meal - Home dependent Lantus 22U QHS, 10U premeal  - Most recent admission was Lantus 14U QHS, and 6U premeal  - Hypoglycemic on BMP  - Lantus 18U --> 12U, c/w Humalog 6 pre-meal

## 2019-10-18 NOTE — PROGRESS NOTE ADULT - PROBLEM SELECTOR PLAN 10
DVT: SQH  Diet: DASH/TLC/CC  Dispo: Pending  Code: FULL DVT: HSQ held due to persistent dark stools  Diet: DASH/TLC/CC  Dispo: Pending  Code: FULL DVT: HSQ held due to one dark stools.   Diet: DASH/TLC/CC  Dispo: Pending  Code: FULL

## 2019-10-18 NOTE — PROGRESS NOTE ADULT - PROBLEM SELECTOR PLAN 9
- c/w ASA, Plavix  - If Hgb continues to drop, consider holding - C/w ASA, Plavix  - If Hgb continues to drop, consider holding

## 2019-10-18 NOTE — PROGRESS NOTE ADULT - ASSESSMENT
89 yo M PMHx CAD s/p stents, CEA, PVD s/p stents, dementia, GI bleed presents sent in by Cardiologist for management of chest pain and EKG changes  No leukocytosis, no fever  BCX with H influenzae pan S  CXR atelectasis  Likely respiratory source for H influenzae  Remains well, no fevers, no chills, repeat BCXs clear  Overall, Bacteremia, respiratory tract infection, leukocytosis  - Ceftriaxone 2g q 24 while inpatient  - Would continue IV while inpatient. After 5 days of IV therapy, when DC planning, can send on PO Augmentin 500/125 tab q 12 to complete total 14 day course  - F/U pending BCXs  - Monitor for further symptoms/resp status; monitor mental status  - After DC, will need to follow up with me in ID clinic for surveillance BCXs ([phone])--discussed with patient and son    Dieter Ford MD  Pager [phone]  After 5pm and on weekends call [phone]

## 2019-10-18 NOTE — PROGRESS NOTE ADULT - PROBLEM SELECTOR PLAN 1
- Now septic (positive blood cultures)  - Urine legionella negative  - F/u repeat Bcx  - Per ID, will continue ceftriaxone 2g daily(5 days of IV Abx before transitioning to PO B-lactam) - Now septic (positive blood cultures)  - Repeat Bcx negative  - Per ID, will continue ceftriaxone 2g daily (5 days of IV Abx before transitioning to PO B-lactam) / day 4/5 - CXR demonstrates worsening pleural effusion; EF 32%  - Likely due to acute systolic HF (2/2 to fluid overload from antibiotic in fluid); unlikely parapneumonic effusion as patient is not toxic (afebrile, no leukocytosis)  - C/w lasix (if BP permits) with goal to be net negative at least 1L; if unsuccessful, will consider lasix drip  - Strict I&Os  - Daily weights  - Monitor BMP for increase in Cr - CXR demonstrates worsening pleural effusion; EF 32%  - Likely due to acute systolic HF (2/2 to fluid overload from antibiotic in fluid); unlikely parapneumonic effusion as patient is not toxic (afebrile, no leukocytosis)  - C/w lasix (if BP permits) with goal to be net negative at least 1L; if unsuccessful, will consider lasix drip  - Strict I&Os  - Daily weights  - Monitor BMP for increase in Cr  - AM CXR Acute on chronic systolic heart failure exacerbation.   - CXR demonstrates worsening pleural effusion; EF 32%  - Likely due to acute systolic HF (2/2 to fluid overload from antibiotic in fluid); unlikely parapneumonic effusion as patient is not toxic (afebrile, no leukocytosis)  - C/w lasix (if BP permits) with goal to be net negative at least 1L; if unsuccessful, will consider lasix drip  - Strict I&Os  - Daily weights  - Monitor BMP for increase in Cr  - AM CXR after 80 of iv lasix.

## 2019-10-18 NOTE — PROGRESS NOTE ADULT - SUBJECTIVE AND OBJECTIVE BOX
CC: F/U for Bacteremia    Saw/spoke to patient. No fevers, no chills. No new complaints.    Allergies  No Known Allergies    ANTIMICROBIALS:  cefTRIAXone   IVPB 2000 every 24 hours    PE:    Vital Signs Last 24 Hrs  T(C): 36.6 (18 Oct 2019 12:00), Max: 36.9 (17 Oct 2019 21:06)  T(F): 97.9 (18 Oct 2019 12:00), Max: 98.4 (17 Oct 2019 21:06)  HR: 75 (18 Oct 2019 12:00) (75 - 89)  BP: 107/58 (18 Oct 2019 12:00) (92/53 - 127/60)  RR: 18 (18 Oct 2019 12:00) (17 - 18)  SpO2: 92% (18 Oct 2019 12:00) (92% - 97%)    Gen: AOx3, NAD, non-toxic  CV: S1+S2 normal, nontachycardic  Resp: Clear bilat, no resp distress, no crackles/wheezes  Abd: Soft, nontender, +BS  Ext: No LE edema, no wounds    LABS:                        8.0    8.49  )-----------( 247      ( 18 Oct 2019 09:00 )             26.5     10-18    141  |  102  |  35<H>  ----------------------------<  69<L>  4.6   |  24  |  1.80<H>    Ca    8.5      18 Oct 2019 07:23  Phos  3.9     10-18  Mg     2.1     10-18    TPro  6.0  /  Alb  2.9<L>  /  TBili  0.1<L>  /  DBili  x   /  AST  50<H>  /  ALT  43  /  AlkPhos  137<H>  10-18    MICROBIOLOGY:    .Blood  10-16-19   No growth to date.     .Urine  10-16-19   No growth    .Blood  10-14-19   No growth to date.  --  Blood Culture PCR  Haemophilus influenzae    Rapid RVP Result: NotDetec (10-14 @ 10:25)    (otherwise reviewed)    RADIOLOGY:    10/17 CXR:    FINDINGS:    Lines/Tubes: None    Heart and mediastinum:  Unchanged.    Lungs, pleura, and airways: Unchanged bilateral effusions with bibasilar   atelectasis.    Bones and soft tissues: The bones and soft tissues are unchanged.    Impression:    Unchanged bilateral effusions with bibasilar atelectasis

## 2019-10-18 NOTE — PROGRESS NOTE ADULT - PROBLEM SELECTOR PLAN 5
- Recently admitted for workup of melena concerning for UGIB; c/w ASA, plavix. Hgb stable so no intervention. Reports dark stools  - Received s/p 1U PRBC in ED  - GI consulted - no plan for endoscopy  - Monitor cbc. VSQ4hr  - Transfuse prn Hgb<8  - Protonix PO 40mg BID - JACQUELINE in anterior leads, STD in precordial leads, diffuse on repeat EKG  - Troponin 1163 --> ST Depression MI  - Appreciate cardiology and CCU recommendations  - Given recent history of GIB will management conservatively without heparin gtt  - Trops peaked  - ASA 81 daily  - Plavix 75 mg daily   - C/w Toprol 50  - Per cardiology, started Cozaar 25; c/w toprol XL 50  - TTE 2D - Moderate segmental LV systolic dysfunction  - Primary Cardiologist Dr. Holman following - JACQUELINE in anterior leads, STD in precordial leads, diffuse on repeat EKG  - Troponin 1163 --> ST Depression MI  - Appreciate cardiology and CCU recommendations  - Given recent history of GIB will management conservatively without heparin gtt  - Trops peaked  - ASA 81 daily  - Plavix 75 mg daily   - C/w Toprol 50; cozaar dc'd given Cr increase  - C/w toprol XL 50  - TTE 2D - Moderate segmental LV systolic dysfunction  - Primary Cardiologist Dr. Holman following

## 2019-10-18 NOTE — PROGRESS NOTE ADULT - PROBLEM SELECTOR PLAN 3
- Initially prerenal due to sepsis and lasix, now ATN   - Increase in Cr > 30% form baseline  - FeUrea 48.2% - suggests intrinsic renal disease  - Ddx includes iatrogenic (recently started on lasix) vs infections (septic 2/2 pneumonia)  - Trend BMP; creat downtrending - Initially prerenal due to sepsis and lasix, now ATN   - Increase in Cr > 30% form baseline  - FeUrea 48.2% - suggests intrinsic renal disease  - Ddx includes iatrogenic (recently started on lasix) vs infections (septic 2/2 pneumonia)  - Trend BMP; Cr stable - Due to haemophilus influenzae  - Management as above  - ID following

## 2019-10-18 NOTE — PROGRESS NOTE ADULT - PROBLEM SELECTOR PLAN 2
- Due to haemophilus influenzae  - Management as above  - ID following - Now septic (positive blood cultures)  - Repeat Bcx negative  - Per ID, will continue ceftriaxone 2g daily (5 days of IV Abx before transitioning to PO B-lactam) / day 4/5; then transition to augmentin to complete total 14 day course

## 2019-10-18 NOTE — PROGRESS NOTE ADULT - PROBLEM SELECTOR PLAN 7
as above. - Cr stable  - Trend BMP - CAD s/p stents, remote hx of MI  - ASA held last admission 2/2 GIB  - Presents with NSTEMI  - c/w ASA, Plavix  - Atorvastatin 80mg QHS  - Toprol 50

## 2019-10-18 NOTE — PROGRESS NOTE ADULT - PROBLEM SELECTOR PLAN 6
- CAD s/p stents, remote hx of MI  - ASA held last admission 2/2 GIB  - Presents with NSTEMI  - c/w ASA, Plavix  - Atorvastatin 80mg QHS  - toprol 50 - CAD s/p stents, remote hx of MI  - ASA held last admission 2/2 GIB  - Presents with NSTEMI  - c/w ASA, Plavix  - Atorvastatin 80mg QHS  - Toprol 50 - Recently admitted for workup of melena concerning for UGIB; c/w ASA, plavix. Hgb stable so no intervention. Reports dark stools  - Received s/p 1U PRBC in ED  - GI consulted - no plan for endoscopy  - Monitor cbc. VSQ4hr  - Transfuse prn Hgb<8  - Protonix PO 40mg BID - Recently admitted for workup of melena concerning for UGIB; c/w ASA, plavix. Hgb stable so no intervention. Reports dark stools  - Received s/p 1U PRBC in ED  - GI consulted - no plan for endoscopy  - Monitor CBC; VSQ4hr  - Transfuse prn Hgb<8  - Protonix PO 40mg BID

## 2019-10-18 NOTE — PROGRESS NOTE ADULT - SUBJECTIVE AND OBJECTIVE BOX
Contact Information:  Sam Carias II, MD, MPH  PGY-1, Internal Medicine  Pager: [phone] ([facility]) /// [phone] ([facility])    HOME ADLER, MRN-[id number]    Patient is a 89y old  Male who presents with a chief complaint of NSTEMI (17 Oct 2019 08:56)      OVERNIGHT EVENTS:    SUBJECTIVE:    CONSTITUTIONAL: No weakness. No fatigue. No fever.  HEAD: No head trauma.   EYES: No vision changes.  ENT: No hearing changes or tinnitus. No ear pain. No changes in smell. No nasal congestion or discharge. No sore throat. No voice hoarseness.   NECK: No neck pain or stiffness. No lumps.  RESPIRATORY: No cough. No SOB. No wheezing. No hemoptysis.   CARDIOVASCULAR: No chest pain. No palpitations.   GASTROINTESTINAL: No dysphagia. No ABD pain. No distension. No constipation. No diarrhea. No pain with defecation. No hematemesis. No hematochezia or melena.  BACK: No back pain.  GENITOURINARY: No dysuria. No frequency or urgency. No hesitancy. No incontinence. No urinary retention. No suprapubic pain. No hematuria.  EXTREMITY: No swelling.  MUSCULOSKELETAL: No joint pain or swelling. No fractures. No stiffness.    SKIN: No rashes. No itching. No skin, hair, or nail changes.  NEUROLOGICAL: No weakness or paralysis. No lightheadedness or dizziness. No HA. No numbness or tingling.   PSYCHIATRIC: No depression.       OBJECTIVE:  Vital Signs Last 24 Hrs  T(C): 36.8 (18 Oct 2019 04:55), Max: 36.9 (17 Oct 2019 21:06)  T(F): 98.2 (18 Oct 2019 04:55), Max: 98.4 (17 Oct 2019 21:06)  HR: 82 (18 Oct 2019 06:10) (79 - 83)  BP: 109/53 (18 Oct 2019 06:10) (103/54 - 127/60)  BP(mean): --  RR: 17 (18 Oct 2019 04:55) (17 - 17)  SpO2: 93% (18 Oct 2019 04:55) (93% - 94%)  I&O's Summary    16 Oct 2019 07:01  -  17 Oct 2019 07:00  --------------------------------------------------------  IN: 240 mL / OUT: 900 mL / NET: -660 mL    17 Oct 2019 07:01  -  18 Oct 2019 06:35  --------------------------------------------------------  IN: 720 mL / OUT: 800 mL / NET: -80 mL        MEDICATIONS  (STANDING):  aspirin enteric coated 81 milliGRAM(s) Oral daily  atorvastatin 80 milliGRAM(s) Oral at bedtime  cefTRIAXone   IVPB 2000 milliGRAM(s) IV Intermittent every 24 hours  cholecalciferol 1000 Unit(s) Oral daily  clopidogrel Tablet 75 milliGRAM(s) Oral daily  dextrose 50% Injectable 12.5 Gram(s) IV Push once  dextrose 50% Injectable 25 Gram(s) IV Push once  dextrose 50% Injectable 25 Gram(s) IV Push once  docusate sodium 100 milliGRAM(s) Oral three times a day  donepezil 10 milliGRAM(s) Oral at bedtime  heparin  Injectable 5000 Unit(s) SubCutaneous every 8 hours  insulin glargine Injectable (LANTUS) 18 Unit(s) SubCutaneous at bedtime  insulin lispro (HumaLOG) corrective regimen sliding scale   SubCutaneous at bedtime  insulin lispro (HumaLOG) corrective regimen sliding scale   SubCutaneous three times a day before meals  insulin lispro Injectable (HumaLOG) 6 Unit(s) SubCutaneous three times a day before meals  losartan 25 milliGRAM(s) Oral daily  metoprolol succinate ER 50 milliGRAM(s) Oral daily  pantoprazole    Tablet 40 milliGRAM(s) Oral two times a day  phenytoin   Capsule 300 milliGRAM(s) Oral at bedtime  polyethylene glycol 3350 17 Gram(s) Oral daily  senna 2 Tablet(s) Oral at bedtime    MEDICATIONS  (PRN):  dextrose 40% Gel 15 Gram(s) Oral once PRN Blood Glucose LESS THAN 70 milliGRAM(s)/deciliter  glucagon  Injectable 1 milliGRAM(s) IntraMuscular once PRN Glucose LESS THAN 70 milligrams/deciliter  ondansetron Injectable 4 milliGRAM(s) IV Push every 8 hours PRN Nausea and/or Vomiting  petrolatum white Ointment 1 Application(s) Topical three times a day PRN Nasal dryness    Allergies    No Known Allergies    Intolerances        CONSTITUTIONAL: No acute distress. Awake and alert.  HEAD: No evidence of trauma. Structures WNL.  EYES: +PERRL. +EOMI. No scleral icterus. No conjunctival injection.  ENT: Hearing intact b/l. Moist oral mucosa. No abnormal lesions. No erythema. No pharyngeal exudates.   NECK: Supple. Appropriate ROM. No stiffness. No masses or lymphadenopathy.  RESPIRATORY: CTAB. No wheezes, rales, or rhonchi. No accessory muscle use. No apparent respiratory distress.  CARDIOVASCULAR: +S1/S2. No audible S3/S4. Regular rate and rhythm. No murmurs, rubs, or gallops.  GASTROINTESTINAL: Soft, nontender, nondistended. +BS. No rebound or guarding.   BACK: No spinal or paraspinal tenderness. No CVA tenderness.  GENITOURINARY: Normal appearing genitalia.  VASCULAR: 2+ radial pulses x b/l UE; 2+ DP pulses x b/l LE.  EXTREMITY: No LE swelling or edema. EXTs warm to touch.  MUSCULOSKELETAL: 5/5 strength x 4EXT. Movement evident in all limbs. No tenderness on palpation.  DERMATOLOGICAL: No abnormal rashes or lesions. No evident hair, skin, or nail changes.  NEUROLOGICAL: CN 2-12 grossly intact. No focal deficits. Sensation intact x 4EXT. A&Ox3 (oriented to person, place, and time).  PSYCHIATRIC: Appropriate affect.                            8.9    9.87  )-----------( 235      ( 17 Oct 2019 07:41 )             28.9       10-17    135  |  102  |  33<H>  ----------------------------<  142<H>  5.2   |  19<L>  |  1.77<H>    Ca    8.6      17 Oct 2019 06:22  Phos  3.7     10-17  Mg     2.2     10-17      CAPILLARY BLOOD GLUCOSE      POCT Blood Glucose.: 149 mg/dL (17 Oct 2019 21:39)  POCT Blood Glucose.: 160 mg/dL (17 Oct 2019 17:04)  POCT Blood Glucose.: 241 mg/dL (17 Oct 2019 12:30)  POCT Blood Glucose.: 184 mg/dL (17 Oct 2019 08:01)              Culture - Blood (collected 16 Oct 2019 05:53)  Source: .Blood  Preliminary Report (17 Oct 2019 06:00):    No growth to date.    Culture - Blood (collected 16 Oct 2019 05:53)  Source: .Blood  Preliminary Report (17 Oct 2019 06:00):    No growth to date.    Culture - Urine (collected 16 Oct 2019 05:32)  Source: .Urine  Final Report (17 Oct 2019 05:26):    No growth          RADIOLOGY AND ADDITIONAL TESTS:    CONSULTANT NOTES REVIEWED:    CARE DISCUSSED WITH THE FOLLOWING CONSULTANTS/PROVIDERS: Contact Information:  Sam Carias II, MD, MPH  PGY-1, Internal Medicine  Pager: [phone] ([facility]) /// [phone] ([facility])    HOME ADLER, MRN-[id number]    Patient is a 89y old  Male who presents with a chief complaint of NSTEMI (17 Oct 2019 08:56)      OVERNIGHT EVENTS: No overnight events.    SUBJECTIVE: Patient found sleeping in bed, arousable. Still complains of cough. No other complaints.    CONSTITUTIONAL: No weakness. No fatigue. No fever.  HEAD: No head trauma.   EYES: No vision changes.  ENT: No hearing changes or tinnitus. No ear pain. No changes in smell. No nasal congestion or discharge. No sore throat. No voice hoarseness.   NECK: No neck pain or stiffness. No lumps.  RESPIRATORY: +cough. No SOB. No wheezing. No hemoptysis.   CARDIOVASCULAR: No chest pain. No palpitations.   GASTROINTESTINAL: No dysphagia. No ABD pain. No distension. No constipation. No diarrhea. No pain with defecation. No hematemesis. No hematochezia or melena.  BACK: No back pain.  GENITOURINARY: No dysuria. No frequency or urgency. No hesitancy. No incontinence. No urinary retention. No suprapubic pain. No hematuria.  EXTREMITY: No swelling.  MUSCULOSKELETAL: No joint pain or swelling. No fractures. No stiffness.    SKIN: No rashes. No itching. No skin, hair, or nail changes.  NEUROLOGICAL: No weakness or paralysis. No lightheadedness or dizziness. No HA. No numbness or tingling.   PSYCHIATRIC: No depression.       OBJECTIVE:  Vital Signs Last 24 Hrs  T(C): 36.8 (18 Oct 2019 04:55), Max: 36.9 (17 Oct 2019 21:06)  T(F): 98.2 (18 Oct 2019 04:55), Max: 98.4 (17 Oct 2019 21:06)  HR: 82 (18 Oct 2019 06:10) (79 - 83)  BP: 109/53 (18 Oct 2019 06:10) (103/54 - 127/60)  BP(mean): --  RR: 17 (18 Oct 2019 04:55) (17 - 17)  SpO2: 93% (18 Oct 2019 04:55) (93% - 94%)  I&O's Summary    16 Oct 2019 07:01  -  17 Oct 2019 07:00  --------------------------------------------------------  IN: 240 mL / OUT: 900 mL / NET: -660 mL    17 Oct 2019 07:01  -  18 Oct 2019 06:35  --------------------------------------------------------  IN: 720 mL / OUT: 800 mL / NET: -80 mL        MEDICATIONS  (STANDING):  aspirin enteric coated 81 milliGRAM(s) Oral daily  atorvastatin 80 milliGRAM(s) Oral at bedtime  cefTRIAXone   IVPB 2000 milliGRAM(s) IV Intermittent every 24 hours  cholecalciferol 1000 Unit(s) Oral daily  clopidogrel Tablet 75 milliGRAM(s) Oral daily  dextrose 50% Injectable 12.5 Gram(s) IV Push once  dextrose 50% Injectable 25 Gram(s) IV Push once  dextrose 50% Injectable 25 Gram(s) IV Push once  docusate sodium 100 milliGRAM(s) Oral three times a day  donepezil 10 milliGRAM(s) Oral at bedtime  heparin  Injectable 5000 Unit(s) SubCutaneous every 8 hours  insulin glargine Injectable (LANTUS) 18 Unit(s) SubCutaneous at bedtime  insulin lispro (HumaLOG) corrective regimen sliding scale   SubCutaneous at bedtime  insulin lispro (HumaLOG) corrective regimen sliding scale   SubCutaneous three times a day before meals  insulin lispro Injectable (HumaLOG) 6 Unit(s) SubCutaneous three times a day before meals  losartan 25 milliGRAM(s) Oral daily  metoprolol succinate ER 50 milliGRAM(s) Oral daily  pantoprazole    Tablet 40 milliGRAM(s) Oral two times a day  phenytoin   Capsule 300 milliGRAM(s) Oral at bedtime  polyethylene glycol 3350 17 Gram(s) Oral daily  senna 2 Tablet(s) Oral at bedtime    MEDICATIONS  (PRN):  dextrose 40% Gel 15 Gram(s) Oral once PRN Blood Glucose LESS THAN 70 milliGRAM(s)/deciliter  glucagon  Injectable 1 milliGRAM(s) IntraMuscular once PRN Glucose LESS THAN 70 milligrams/deciliter  ondansetron Injectable 4 milliGRAM(s) IV Push every 8 hours PRN Nausea and/or Vomiting  petrolatum white Ointment 1 Application(s) Topical three times a day PRN Nasal dryness    Allergies    No Known Allergies    Intolerances        CONSTITUTIONAL: Sleeping but arousable.  EYES: +EOMI. No scleral icterus. No conjunctival injection.  ENT: Moderately moist tongue.  RESPIRATORY: Audible crackling b/l. Decreased R side lung sounds. No wheezes, rales, or rhonchi. No accessory muscle use. No apparent respiratory distress.  CARDIOVASCULAR: +S1/S2. No audible S3/S4. Regular rate and rhythm. No murmurs, rubs, or gallops.  GASTROINTESTINAL: Soft, nontender, nondistended. +BS. No rebound or guarding.   VASCULAR: Distal pulses present x4EXT  EXTREMITY: No LE swelling or edema. EXTs warm to touch.  MUSCULOSKELETAL: Movement x4EXT  DERMATOLOGICAL: No abnormal rashes or lesions. No evident hair, skin, or nail changes.  NEUROLOGICAL: No focal deficits. A&Ox3 (oriented to person, place, and time).  PSYCHIATRIC: Appropriate affect.                            8.9    9.87  )-----------( 235      ( 17 Oct 2019 07:41 )             28.9       10-17    135  |  102  |  33<H>  ----------------------------<  142<H>  5.2   |  19<L>  |  1.77<H>    Ca    8.6      17 Oct 2019 06:22  Phos  3.7     10-17  Mg     2.2     10-17      CAPILLARY BLOOD GLUCOSE      POCT Blood Glucose.: 149 mg/dL (17 Oct 2019 21:39)  POCT Blood Glucose.: 160 mg/dL (17 Oct 2019 17:04)  POCT Blood Glucose.: 241 mg/dL (17 Oct 2019 12:30)  POCT Blood Glucose.: 184 mg/dL (17 Oct 2019 08:01)              Culture - Blood (collected 16 Oct 2019 05:53)  Source: .Blood  Preliminary Report (17 Oct 2019 06:00):    No growth to date.    Culture - Blood (collected 16 Oct 2019 05:53)  Source: .Blood  Preliminary Report (17 Oct 2019 06:00):    No growth to date.    Culture - Urine (collected 16 Oct 2019 05:32)  Source: .Urine  Final Report (17 Oct 2019 05:26):    No growth          RADIOLOGY AND ADDITIONAL TESTS:    CONSULTANT NOTES REVIEWED:    CARE DISCUSSED WITH THE FOLLOWING CONSULTANTS/PROVIDERS: Contact Information:  Sam Carias II, MD, MPH  PGY-1, Internal Medicine  Pager: [phone] ([facility]) /// [phone] ([facility])    HOME ADLER, MRN-[id number]    Patient is a 89y old  Male who presents with a chief complaint of NSTEMI (17 Oct 2019 08:56)      OVERNIGHT EVENTS: No overnight events.    SUBJECTIVE:  Still complains of cough. No other complaints.    CONSTITUTIONAL: No weakness. No fatigue. No fever.  HEAD: No head trauma.   EYES: No vision changes.  ENT: No hearing changes or tinnitus. No ear pain. No changes in smell. No nasal congestion or discharge. No sore throat. No voice hoarseness.   NECK: No neck pain or stiffness. No lumps.  RESPIRATORY: +cough. No SOB. No wheezing. No hemoptysis.   CARDIOVASCULAR: No chest pain. No palpitations.   GASTROINTESTINAL: No dysphagia. No ABD pain. No distension. No constipation. No diarrhea. No pain with defecation. No hematemesis. No hematochezia or melena.  BACK: No back pain.  GENITOURINARY: No dysuria. No frequency or urgency. No hesitancy. No incontinence. No urinary retention. No suprapubic pain. No hematuria.  EXTREMITY: No swelling.  MUSCULOSKELETAL: No joint pain or swelling. No fractures. No stiffness.    SKIN: No rashes. No itching. No skin, hair, or nail changes.  NEUROLOGICAL: No weakness or paralysis. No lightheadedness or dizziness. No HA. No numbness or tingling.   PSYCHIATRIC: No depression.       OBJECTIVE:  Vital Signs Last 24 Hrs  T(C): 36.8 (18 Oct 2019 04:55), Max: 36.9 (17 Oct 2019 21:06)  T(F): 98.2 (18 Oct 2019 04:55), Max: 98.4 (17 Oct 2019 21:06)  HR: 82 (18 Oct 2019 06:10) (79 - 83)  BP: 109/53 (18 Oct 2019 06:10) (103/54 - 127/60)  BP(mean): --  RR: 17 (18 Oct 2019 04:55) (17 - 17)  SpO2: 93% (18 Oct 2019 04:55) (93% - 94%)  I&O's Summary    16 Oct 2019 07:01  -  17 Oct 2019 07:00  --------------------------------------------------------  IN: 240 mL / OUT: 900 mL / NET: -660 mL    17 Oct 2019 07:01  -  18 Oct 2019 06:35  --------------------------------------------------------  IN: 720 mL / OUT: 800 mL / NET: -80 mL        MEDICATIONS  (STANDING):  aspirin enteric coated 81 milliGRAM(s) Oral daily  atorvastatin 80 milliGRAM(s) Oral at bedtime  cefTRIAXone   IVPB 2000 milliGRAM(s) IV Intermittent every 24 hours  cholecalciferol 1000 Unit(s) Oral daily  clopidogrel Tablet 75 milliGRAM(s) Oral daily  dextrose 50% Injectable 12.5 Gram(s) IV Push once  dextrose 50% Injectable 25 Gram(s) IV Push once  dextrose 50% Injectable 25 Gram(s) IV Push once  docusate sodium 100 milliGRAM(s) Oral three times a day  donepezil 10 milliGRAM(s) Oral at bedtime  heparin  Injectable 5000 Unit(s) SubCutaneous every 8 hours  insulin glargine Injectable (LANTUS) 18 Unit(s) SubCutaneous at bedtime  insulin lispro (HumaLOG) corrective regimen sliding scale   SubCutaneous at bedtime  insulin lispro (HumaLOG) corrective regimen sliding scale   SubCutaneous three times a day before meals  insulin lispro Injectable (HumaLOG) 6 Unit(s) SubCutaneous three times a day before meals  losartan 25 milliGRAM(s) Oral daily  metoprolol succinate ER 50 milliGRAM(s) Oral daily  pantoprazole    Tablet 40 milliGRAM(s) Oral two times a day  phenytoin   Capsule 300 milliGRAM(s) Oral at bedtime  polyethylene glycol 3350 17 Gram(s) Oral daily  senna 2 Tablet(s) Oral at bedtime    MEDICATIONS  (PRN):  dextrose 40% Gel 15 Gram(s) Oral once PRN Blood Glucose LESS THAN 70 milliGRAM(s)/deciliter  glucagon  Injectable 1 milliGRAM(s) IntraMuscular once PRN Glucose LESS THAN 70 milligrams/deciliter  ondansetron Injectable 4 milliGRAM(s) IV Push every 8 hours PRN Nausea and/or Vomiting  petrolatum white Ointment 1 Application(s) Topical three times a day PRN Nasal dryness    Allergies    No Known Allergies    Intolerances      CONSTITUTIONAL: No acute distress. Awake and alert.  HEAD: No evidence of trauma. Structures WNL.  EYES: +PERRL. Slight conjunctival pallor.  ENT: Dry oral mucosa. No abnormal lesions. No erythema. No pharyngeal exudates.   NECK: Supple.   RESPIRATORY:  Bibasilar crackles; decreased breath sounds on RLL. Good air movement.  No wheezes or rhonchi. No accessory muscle use. No apparent respiratory distress.  CARDIOVASCULAR: +S1/S2. No audible S3/S4. Extremities warm and well perfused. 1+ LE edema which is equal. No toe ulcers. No venous ulcers.   GASTROINTESTINAL: Soft, nontender, nondistended. +BS. No rebound or guarding. Brown stool noted in toilet.   BACK: No spinal or paraspinal tenderness. No CVA tenderness.  VASCULAR: 2+ radial pulses x b/l UE; 2+ DP pulses x b/l LE.  MUSCULOSKELETAL: Movement in all limbs. No neck stiffness.   DERMATOLOGICAL: No abnormal rashes or lesions. No evident hair, skin, or nail changes.  NEUROLOGICAL: CN 2-12 grossly intact. No focal deficits. Sensation intact x 4EXT. A&Ox3 (oriented to person, place, and time).                              8.9    9.87  )-----------( 235      ( 17 Oct 2019 07:41 )             28.9       10-17    135  |  102  |  33<H>  ----------------------------<  142<H>  5.2   |  19<L>  |  1.77<H>    Ca    8.6      17 Oct 2019 06:22  Phos  3.7     10-17  Mg     2.2     10-17      CAPILLARY BLOOD GLUCOSE      POCT Blood Glucose.: 149 mg/dL (17 Oct 2019 21:39)  POCT Blood Glucose.: 160 mg/dL (17 Oct 2019 17:04)  POCT Blood Glucose.: 241 mg/dL (17 Oct 2019 12:30)  POCT Blood Glucose.: 184 mg/dL (17 Oct 2019 08:01)              Culture - Blood (collected 16 Oct 2019 05:53)  Source: .Blood  Preliminary Report (17 Oct 2019 06:00):    No growth to date.    Culture - Blood (collected 16 Oct 2019 05:53)  Source: .Blood  Preliminary Report (17 Oct 2019 06:00):    No growth to date.    Culture - Urine (collected 16 Oct 2019 05:32)  Source: .Urine  Final Report (17 Oct 2019 05:26):    No growth          RADIOLOGY AND ADDITIONAL TESTS:    CONSULTANT NOTES REVIEWED:    CARE DISCUSSED WITH THE FOLLOWING CONSULTANTS/PROVIDERS:

## 2019-10-18 NOTE — PROGRESS NOTE ADULT - ASSESSMENT
89 yo M PMHx CAD s/p stents, CEA, PVD s/p stents, dementia, GI bleed presents sent in by Cardiologist for management of NSTEMI. Patient clinically stable, being followed by outpatient Cardiologist Dr. Holman. Newly febrile with elevated WBC, blood cultures drawn - Bcx positive for H. influenzae, on ceftriaxone 89 yo M PMHx CAD s/p stents, CEA, PVD s/p stents, dementia, GI bleed presents sent in by Cardiologist for management of NSTEMI. Patient clinically stable, being followed by outpatient Cardiologist Dr. Holman. Newly febrile with elevated WBC, blood cultures drawn - Bcx positive for H. influenzae, on ceftriaxone. CXR demonstrates worsening pleural effusion likely 2/2 acute heart failure.

## 2019-10-19 LAB
ANION GAP SERPL CALC-SCNC: 10 MMOL/L — SIGNIFICANT CHANGE UP (ref 5–17)
BUN SERPL-MCNC: 31 MG/DL — HIGH (ref 7–23)
CALCIUM SERPL-MCNC: 8.6 MG/DL — SIGNIFICANT CHANGE UP (ref 8.4–10.5)
CHLORIDE SERPL-SCNC: 100 MMOL/L — SIGNIFICANT CHANGE UP (ref 96–108)
CO2 SERPL-SCNC: 29 MMOL/L — SIGNIFICANT CHANGE UP (ref 22–31)
CREAT SERPL-MCNC: 1.79 MG/DL — HIGH (ref 0.5–1.3)
CULTURE RESULTS: SIGNIFICANT CHANGE UP
GLUCOSE BLDC GLUCOMTR-MCNC: 144 MG/DL — HIGH (ref 70–99)
GLUCOSE BLDC GLUCOMTR-MCNC: 201 MG/DL — HIGH (ref 70–99)
GLUCOSE BLDC GLUCOMTR-MCNC: 206 MG/DL — HIGH (ref 70–99)
GLUCOSE BLDC GLUCOMTR-MCNC: 254 MG/DL — HIGH (ref 70–99)
GLUCOSE SERPL-MCNC: 156 MG/DL — HIGH (ref 70–99)
HCT VFR BLD CALC: 30.8 % — LOW (ref 39–50)
HGB BLD-MCNC: 9.2 G/DL — LOW (ref 13–17)
MAGNESIUM SERPL-MCNC: 2.1 MG/DL — SIGNIFICANT CHANGE UP (ref 1.6–2.6)
MCHC RBC-ENTMCNC: 29.9 GM/DL — LOW (ref 32–36)
MCHC RBC-ENTMCNC: 31.6 PG — SIGNIFICANT CHANGE UP (ref 27–34)
MCV RBC AUTO: 105.8 FL — HIGH (ref 80–100)
PHOSPHATE SERPL-MCNC: 4.6 MG/DL — HIGH (ref 2.5–4.5)
PLATELET # BLD AUTO: 267 K/UL — SIGNIFICANT CHANGE UP (ref 150–400)
POTASSIUM SERPL-MCNC: 5.3 MMOL/L — SIGNIFICANT CHANGE UP (ref 3.5–5.3)
POTASSIUM SERPL-SCNC: 5.3 MMOL/L — SIGNIFICANT CHANGE UP (ref 3.5–5.3)
RBC # BLD: 2.91 M/UL — LOW (ref 4.2–5.8)
RBC # FLD: 16.9 % — HIGH (ref 10.3–14.5)
SODIUM SERPL-SCNC: 139 MMOL/L — SIGNIFICANT CHANGE UP (ref 135–145)
SPECIMEN SOURCE: SIGNIFICANT CHANGE UP
WBC # BLD: 8.4 K/UL — SIGNIFICANT CHANGE UP (ref 3.8–10.5)
WBC # FLD AUTO: 8.4 K/UL — SIGNIFICANT CHANGE UP (ref 3.8–10.5)

## 2019-10-19 PROCEDURE — 71045 X-RAY EXAM CHEST 1 VIEW: CPT | Mod: 26

## 2019-10-19 PROCEDURE — 99233 SBSQ HOSP IP/OBS HIGH 50: CPT

## 2019-10-19 RX ADMIN — ATORVASTATIN CALCIUM 80 MILLIGRAM(S): 80 TABLET, FILM COATED ORAL at 21:57

## 2019-10-19 RX ADMIN — Medication 81 MILLIGRAM(S): at 11:34

## 2019-10-19 RX ADMIN — Medication 6 UNIT(S): at 12:39

## 2019-10-19 RX ADMIN — CEFTRIAXONE 100 MILLIGRAM(S): 500 INJECTION, POWDER, FOR SOLUTION INTRAMUSCULAR; INTRAVENOUS at 17:51

## 2019-10-19 RX ADMIN — Medication 300 MILLIGRAM(S): at 21:57

## 2019-10-19 RX ADMIN — Medication 100 MILLIGRAM(S): at 06:06

## 2019-10-19 RX ADMIN — INSULIN GLARGINE 12 UNIT(S): 100 INJECTION, SOLUTION SUBCUTANEOUS at 21:57

## 2019-10-19 RX ADMIN — Medication 100 MILLIGRAM(S): at 21:57

## 2019-10-19 RX ADMIN — SENNA PLUS 2 TABLET(S): 8.6 TABLET ORAL at 21:57

## 2019-10-19 RX ADMIN — Medication 1000 UNIT(S): at 11:35

## 2019-10-19 RX ADMIN — PANTOPRAZOLE SODIUM 40 MILLIGRAM(S): 20 TABLET, DELAYED RELEASE ORAL at 06:06

## 2019-10-19 RX ADMIN — Medication 3: at 17:44

## 2019-10-19 RX ADMIN — PANTOPRAZOLE SODIUM 40 MILLIGRAM(S): 20 TABLET, DELAYED RELEASE ORAL at 17:45

## 2019-10-19 RX ADMIN — Medication 2: at 12:38

## 2019-10-19 RX ADMIN — CLOPIDOGREL BISULFATE 75 MILLIGRAM(S): 75 TABLET, FILM COATED ORAL at 11:35

## 2019-10-19 RX ADMIN — Medication 6 UNIT(S): at 08:27

## 2019-10-19 RX ADMIN — DONEPEZIL HYDROCHLORIDE 10 MILLIGRAM(S): 10 TABLET, FILM COATED ORAL at 21:57

## 2019-10-19 RX ADMIN — Medication 50 MILLIGRAM(S): at 06:06

## 2019-10-19 RX ADMIN — Medication 6 UNIT(S): at 17:45

## 2019-10-19 NOTE — PROGRESS NOTE ADULT - PROBLEM SELECTOR PLAN 5
- JACQUELINE in anterior leads, STD in precordial leads, diffuse on repeat EKG  - Troponin 1163 --> ST Depression MI  - Appreciate cardiology and CCU recommendations  - Given recent history of GIB will management conservatively without heparin gtt  - Trops peaked  - ASA 81 daily  - Plavix 75 mg daily   - C/w Toprol 50; cozaar dc'd given Cr increase  - C/w toprol XL 50  - TTE 2D - Moderate segmental LV systolic dysfunction  - Primary Cardiologist Dr. Holman following

## 2019-10-19 NOTE — PROGRESS NOTE ADULT - PROBLEM SELECTOR PLAN 1
Acute on chronic systolic heart failure exacerbation.   - CXR demonstrates worsening pleural effusion; EF 32%  - Likely due to acute systolic HF (2/2 to fluid overload from antibiotic in fluid); unlikely parapneumonic effusion as patient is not toxic (afebrile, no leukocytosis)  - C/w lasix (if BP permits) with goal to be net negative at least 1L; if unsuccessful, will consider lasix drip  - Strict I&Os  - Daily weights  - Monitor BMP for increase in Cr  - Daily AM Acute on chronic systolic heart failure exacerbation.   - CXR demonstrates worsening pleural effusion; EF 32%  - Likely due to acute systolic HF (2/2 to fluid overload from antibiotic in fluid); unlikely parapneumonic effusion as patient is not toxic (afebrile, no leukocytosis)  - C/w lasix (if BP permits) with goal to be net negative at least 1L  - Strict I&Os  - Daily weights  - Monitor BMP for increase in Cr  - Daily AM CXR

## 2019-10-19 NOTE — PROGRESS NOTE ADULT - PROBLEM SELECTOR PLAN 8
- Home dependent Lantus 22U QHS, 10U premeal  - Most recent admission was Lantus 14U QHS, and 6U premeal  - Hypoglycemic on BMP  - Lantus 18U --> 12U, c/w Humalog 6 pre-meal

## 2019-10-19 NOTE — PROGRESS NOTE ADULT - SUBJECTIVE AND OBJECTIVE BOX
Contact Information:  Sam Carias II, MD, MPH  PGY-1, Internal Medicine  Pager: [phone] ([facility]) /// [phone] ([facility])    HOME ADLER, MRN-[id number]    Patient is a 89y old  Male who presents with a chief complaint of NSTEMI (18 Oct 2019 06:35)      OVERNIGHT EVENTS: No overnight events.    SUBJECTIVE: Patient found sleeping in bed, arousable. Still endorses cough, no other complaints.    CONSTITUTIONAL: No weakness. No fatigue. No fever.  HEAD: No head trauma.   EYES: No vision changes.  ENT: No hearing changes or tinnitus. No ear pain. No changes in smell. No nasal congestion or discharge. No sore throat. No voice hoarseness.   NECK: No neck pain or stiffness. No lumps.  RESPIRATORY: +cough. No SOB. No wheezing. No hemoptysis.   CARDIOVASCULAR: No chest pain. No palpitations.   GASTROINTESTINAL: No dysphagia. No ABD pain. No distension. No constipation. No diarrhea. No pain with defecation. No hematemesis. No hematochezia or melena.  BACK: No back pain.  GENITOURINARY: No dysuria. No frequency or urgency. No hesitancy. No incontinence. No urinary retention. No suprapubic pain. No hematuria.  EXTREMITY: No swelling.  MUSCULOSKELETAL: No joint pain or swelling. No fractures. No stiffness.    SKIN: No rashes. No itching. No skin, hair, or nail changes.  NEUROLOGICAL: No weakness or paralysis. No lightheadedness or dizziness. No HA. No numbness or tingling.   PSYCHIATRIC: No depression.       OBJECTIVE:  Vital Signs Last 24 Hrs  T(C): 36.6 (19 Oct 2019 04:52), Max: 37.3 (18 Oct 2019 14:54)  T(F): 97.8 (19 Oct 2019 04:52), Max: 99.1 (18 Oct 2019 14:54)  HR: 82 (19 Oct 2019 04:52) (74 - 100)  BP: 159/77 (19 Oct 2019 04:52) (92/53 - 159/77)  BP(mean): --  RR: 16 (18 Oct 2019 23:28) (16 - 18)  SpO2: 95% (19 Oct 2019 04:52) (92% - 97%)  I&O's Summary    18 Oct 2019 07:01  -  19 Oct 2019 07:00  --------------------------------------------------------  IN: 410 mL / OUT: 2500 mL / NET: -2090 mL        MEDICATIONS  (STANDING):  aspirin enteric coated 81 milliGRAM(s) Oral daily  atorvastatin 80 milliGRAM(s) Oral at bedtime  cefTRIAXone   IVPB 2000 milliGRAM(s) IV Intermittent every 24 hours  cholecalciferol 1000 Unit(s) Oral daily  clopidogrel Tablet 75 milliGRAM(s) Oral daily  dextrose 50% Injectable 12.5 Gram(s) IV Push once  dextrose 50% Injectable 25 Gram(s) IV Push once  dextrose 50% Injectable 25 Gram(s) IV Push once  docusate sodium 100 milliGRAM(s) Oral three times a day  donepezil 10 milliGRAM(s) Oral at bedtime  insulin glargine Injectable (LANTUS) 12 Unit(s) SubCutaneous at bedtime  insulin lispro (HumaLOG) corrective regimen sliding scale   SubCutaneous at bedtime  insulin lispro (HumaLOG) corrective regimen sliding scale   SubCutaneous three times a day before meals  insulin lispro Injectable (HumaLOG) 6 Unit(s) SubCutaneous three times a day before meals  metoprolol succinate ER 50 milliGRAM(s) Oral daily  pantoprazole    Tablet 40 milliGRAM(s) Oral two times a day  phenytoin   Capsule 300 milliGRAM(s) Oral at bedtime  polyethylene glycol 3350 17 Gram(s) Oral daily  senna 2 Tablet(s) Oral at bedtime    MEDICATIONS  (PRN):  dextrose 40% Gel 15 Gram(s) Oral once PRN Blood Glucose LESS THAN 70 milliGRAM(s)/deciliter  glucagon  Injectable 1 milliGRAM(s) IntraMuscular once PRN Glucose LESS THAN 70 milligrams/deciliter  ondansetron Injectable 4 milliGRAM(s) IV Push every 8 hours PRN Nausea and/or Vomiting  petrolatum white Ointment 1 Application(s) Topical three times a day PRN Nasal dryness    Allergies    No Known Allergies    Intolerances        CONSTITUTIONAL: Sleeping but arousable. Alert on awakening.  EYES: +PERRL. Slight conjunctival pallor.  ENT: Dry oral mucosa. No abnormal lesions. No erythema. No pharyngeal exudates.   NECK: Supple.   RESPIRATORY: Audible bibasilar crackles; decreased breath sounds on RLL. Good air movement particularly in left lung fields. No wheezes or rhonchi. No accessory muscle use. No apparent respiratory distress.  CARDIOVASCULAR: +S1/S2. No audible S3/S4. Extremities warm and well perfused. 1+ LE edema b/l.   GASTROINTESTINAL: Soft, nontender, nondistended. +BS. No rebound or guarding.   BACK: No spinal or paraspinal tenderness. No CVA tenderness.  VASCULAR: 2+ radial pulses x b/l UE; 2+ DP pulses x b/l LE.  MUSCULOSKELETAL: Movement in all limbs.    DERMATOLOGICAL: No abnormal rashes or lesions. No evident hair, skin, or nail changes.  NEUROLOGICAL: CN 2-12 grossly intact. No focal deficits. Sensation intact x 4EXT. A&Ox3 (oriented to person, place, and time).                              8.0    8.49  )-----------( 247      ( 18 Oct 2019 09:00 )             26.5       10-18    141  |  102  |  35<H>  ----------------------------<  69<L>  4.6   |  24  |  1.80<H>    Ca    8.5      18 Oct 2019 07:23  Phos  3.9     10-18  Mg     2.1     10-18    TPro  6.0  /  Alb  2.9<L>  /  TBili  0.1<L>  /  DBili  x   /  AST  50<H>  /  ALT  43  /  AlkPhos  137<H>  10-18    CAPILLARY BLOOD GLUCOSE      POCT Blood Glucose.: 155 mg/dL (18 Oct 2019 21:27)  POCT Blood Glucose.: 236 mg/dL (18 Oct 2019 18:17)  POCT Blood Glucose.: 216 mg/dL (18 Oct 2019 14:15)  POCT Blood Glucose.: 101 mg/dL (18 Oct 2019 07:55)    LIVER FUNCTIONS - ( 18 Oct 2019 07:23 )  Alb: 2.9 g/dL / Pro: 6.0 g/dL / ALK PHOS: 137 U/L / ALT: 43 U/L / AST: 50 U/L / GGT: x                       RADIOLOGY AND ADDITIONAL TESTS:    CONSULTANT NOTES REVIEWED:    CARE DISCUSSED WITH THE FOLLOWING CONSULTANTS/PROVIDERS:

## 2019-10-19 NOTE — PROGRESS NOTE ADULT - ASSESSMENT
89 yo M PMHx CAD s/p stents, CEA, PVD s/p stents, dementia, GI bleed presents sent in by Cardiologist for management of NSTEMI. Patient clinically stable, being followed by outpatient Cardiologist Dr. Holman. Newly febrile with elevated WBC, blood cultures drawn - Bcx positive for H. influenzae, on ceftriaxone. CXR demonstrates worsening pleural effusion likely 2/2 acute heart failure.

## 2019-10-19 NOTE — PROGRESS NOTE ADULT - PROBLEM SELECTOR PLAN 4
- Initially prerenal due to sepsis and lasix, now ATN   - Increase in Cr > 30% from baseline  - FeUrea 48.2% - suggests intrinsic renal disease  - Ddx includes iatrogenic (recently started on lasix) vs infections (septic 2/2 pneumonia)  - Uptrending Cr; Cozaar held  - Trend BMP for further increase in Cr

## 2019-10-19 NOTE — PROGRESS NOTE ADULT - PROBLEM SELECTOR PLAN 6
- Recently admitted for workup of melena concerning for UGIB; c/w ASA, plavix. Hgb stable so no intervention. Reports dark stools  - Received s/p 1U PRBC in ED  - GI consulted - no plan for endoscopy  - Monitor CBC; VSQ4hr  - Transfuse prn Hgb<8  - Protonix PO 40mg BID

## 2019-10-19 NOTE — PROGRESS NOTE ADULT - PROBLEM SELECTOR PLAN 2
- Now septic (positive blood cultures)  - Repeat Bcx negative  - Per ID, will continue ceftriaxone 2g daily (5 days of IV Abx before transitioning to PO B-lactam) / day 5/5; then transition to augmentin to complete total 14 day course

## 2019-10-20 LAB
ANION GAP SERPL CALC-SCNC: 11 MMOL/L — SIGNIFICANT CHANGE UP (ref 5–17)
BUN SERPL-MCNC: 24 MG/DL — HIGH (ref 7–23)
CALCIUM SERPL-MCNC: 8 MG/DL — LOW (ref 8.4–10.5)
CHLORIDE SERPL-SCNC: 103 MMOL/L — SIGNIFICANT CHANGE UP (ref 96–108)
CO2 SERPL-SCNC: 28 MMOL/L — SIGNIFICANT CHANGE UP (ref 22–31)
CREAT SERPL-MCNC: 1.59 MG/DL — HIGH (ref 0.5–1.3)
GLUCOSE BLDC GLUCOMTR-MCNC: 127 MG/DL — HIGH (ref 70–99)
GLUCOSE BLDC GLUCOMTR-MCNC: 163 MG/DL — HIGH (ref 70–99)
GLUCOSE BLDC GLUCOMTR-MCNC: 320 MG/DL — HIGH (ref 70–99)
GLUCOSE BLDC GLUCOMTR-MCNC: 339 MG/DL — HIGH (ref 70–99)
GLUCOSE SERPL-MCNC: 120 MG/DL — HIGH (ref 70–99)
HCT VFR BLD CALC: 28.1 % — LOW (ref 39–50)
HGB BLD-MCNC: 8.4 G/DL — LOW (ref 13–17)
MAGNESIUM SERPL-MCNC: 2.2 MG/DL — SIGNIFICANT CHANGE UP (ref 1.6–2.6)
MCHC RBC-ENTMCNC: 29.9 GM/DL — LOW (ref 32–36)
MCHC RBC-ENTMCNC: 31 PG — SIGNIFICANT CHANGE UP (ref 27–34)
MCV RBC AUTO: 103.7 FL — HIGH (ref 80–100)
PHOSPHATE SERPL-MCNC: 3.8 MG/DL — SIGNIFICANT CHANGE UP (ref 2.5–4.5)
PLATELET # BLD AUTO: 278 K/UL — SIGNIFICANT CHANGE UP (ref 150–400)
POTASSIUM SERPL-MCNC: 4.6 MMOL/L — SIGNIFICANT CHANGE UP (ref 3.5–5.3)
POTASSIUM SERPL-SCNC: 4.6 MMOL/L — SIGNIFICANT CHANGE UP (ref 3.5–5.3)
RBC # BLD: 2.71 M/UL — LOW (ref 4.2–5.8)
RBC # FLD: 16.8 % — HIGH (ref 10.3–14.5)
SODIUM SERPL-SCNC: 142 MMOL/L — SIGNIFICANT CHANGE UP (ref 135–145)
WBC # BLD: 9.02 K/UL — SIGNIFICANT CHANGE UP (ref 3.8–10.5)
WBC # FLD AUTO: 9.02 K/UL — SIGNIFICANT CHANGE UP (ref 3.8–10.5)

## 2019-10-20 PROCEDURE — 99233 SBSQ HOSP IP/OBS HIGH 50: CPT

## 2019-10-20 PROCEDURE — 71045 X-RAY EXAM CHEST 1 VIEW: CPT | Mod: 26

## 2019-10-20 RX ADMIN — Medication 2: at 21:55

## 2019-10-20 RX ADMIN — Medication 100 MILLIGRAM(S): at 21:55

## 2019-10-20 RX ADMIN — Medication 6 UNIT(S): at 08:33

## 2019-10-20 RX ADMIN — ATORVASTATIN CALCIUM 80 MILLIGRAM(S): 80 TABLET, FILM COATED ORAL at 21:55

## 2019-10-20 RX ADMIN — Medication 4: at 13:05

## 2019-10-20 RX ADMIN — Medication 1: at 17:42

## 2019-10-20 RX ADMIN — Medication 6 UNIT(S): at 13:04

## 2019-10-20 RX ADMIN — PANTOPRAZOLE SODIUM 40 MILLIGRAM(S): 20 TABLET, DELAYED RELEASE ORAL at 17:43

## 2019-10-20 RX ADMIN — Medication 50 MILLIGRAM(S): at 05:44

## 2019-10-20 RX ADMIN — DONEPEZIL HYDROCHLORIDE 10 MILLIGRAM(S): 10 TABLET, FILM COATED ORAL at 21:55

## 2019-10-20 RX ADMIN — PANTOPRAZOLE SODIUM 40 MILLIGRAM(S): 20 TABLET, DELAYED RELEASE ORAL at 05:44

## 2019-10-20 RX ADMIN — Medication 300 MILLIGRAM(S): at 21:55

## 2019-10-20 RX ADMIN — Medication 6 UNIT(S): at 17:42

## 2019-10-20 RX ADMIN — Medication 1 TABLET(S): at 17:42

## 2019-10-20 RX ADMIN — Medication 81 MILLIGRAM(S): at 13:05

## 2019-10-20 RX ADMIN — SENNA PLUS 2 TABLET(S): 8.6 TABLET ORAL at 21:55

## 2019-10-20 RX ADMIN — Medication 100 MILLIGRAM(S): at 13:06

## 2019-10-20 RX ADMIN — Medication 1000 UNIT(S): at 13:05

## 2019-10-20 RX ADMIN — CLOPIDOGREL BISULFATE 75 MILLIGRAM(S): 75 TABLET, FILM COATED ORAL at 13:06

## 2019-10-20 RX ADMIN — INSULIN GLARGINE 12 UNIT(S): 100 INJECTION, SOLUTION SUBCUTANEOUS at 21:55

## 2019-10-20 NOTE — PROGRESS NOTE ADULT - SUBJECTIVE AND OBJECTIVE BOX
Contact Information:  Sam Carias II, MD, MPH  PGY-1, Internal Medicine  Pager: [phone] ([facility]) /// [phone] ([facility])    HOME ADLER, MRN-[id number]    Patient is a 89y old  Male who presents with a chief complaint of NSTEMI (20 Oct 2019 06:35)      OVERNIGHT EVENTS: No overnight events.    SUBJECTIVE: Patient found sleeping, easily arousable. Patient endorses residual but resolving cough. Had a significant bowel movement in past 24 hours, color unknown. No other complaints.    CONSTITUTIONAL: No weakness. No fatigue. No fever.  HEAD: No head trauma.   EYES: No vision changes.  ENT: No hearing changes or tinnitus. No ear pain. No changes in smell. No nasal congestion or discharge. No sore throat. No voice hoarseness.   NECK: No neck pain or stiffness. No lumps.  RESPIRATORY: +cough. No SOB. No wheezing. No hemoptysis.   CARDIOVASCULAR: No chest pain. No palpitations.   GASTROINTESTINAL: No dysphagia. No ABD pain. No distension. No constipation. No diarrhea. No pain with defecation. No hematemesis. No hematochezia or melena.  BACK: No back pain.  GENITOURINARY: No dysuria. No frequency or urgency. No hesitancy. No incontinence. No urinary retention. No suprapubic pain. No hematuria.  EXTREMITY: No swelling.  MUSCULOSKELETAL: No joint pain or swelling. No fractures. No stiffness.    SKIN: No rashes. No itching. No skin, hair, or nail changes.  NEUROLOGICAL: No weakness or paralysis. No lightheadedness or dizziness. No HA. No numbness or tingling.   PSYCHIATRIC: No depression.       OBJECTIVE:  Vital Signs Last 24 Hrs  T(C): 36.7 (20 Oct 2019 04:44), Max: 36.9 (19 Oct 2019 13:21)  T(F): 98.1 (20 Oct 2019 04:44), Max: 98.5 (19 Oct 2019 13:21)  HR: 86 (20 Oct 2019 04:44) (76 - 86)  BP: 147/55 (20 Oct 2019 04:44) (112/64 - 147/55)  BP(mean): --  RR: 17 (20 Oct 2019 00:10) (16 - 17)  SpO2: 94% (20 Oct 2019 04:44) (94% - 95%)  I&O's Summary    19 Oct 2019 07:01  -  20 Oct 2019 07:00  --------------------------------------------------------  IN: 720 mL / OUT: 850 mL / NET: -130 mL        MEDICATIONS  (STANDING):  aspirin enteric coated 81 milliGRAM(s) Oral daily  atorvastatin 80 milliGRAM(s) Oral at bedtime  cefTRIAXone   IVPB 2000 milliGRAM(s) IV Intermittent every 24 hours  cholecalciferol 1000 Unit(s) Oral daily  clopidogrel Tablet 75 milliGRAM(s) Oral daily  dextrose 50% Injectable 12.5 Gram(s) IV Push once  dextrose 50% Injectable 25 Gram(s) IV Push once  dextrose 50% Injectable 25 Gram(s) IV Push once  docusate sodium 100 milliGRAM(s) Oral three times a day  donepezil 10 milliGRAM(s) Oral at bedtime  insulin glargine Injectable (LANTUS) 12 Unit(s) SubCutaneous at bedtime  insulin lispro (HumaLOG) corrective regimen sliding scale   SubCutaneous at bedtime  insulin lispro (HumaLOG) corrective regimen sliding scale   SubCutaneous three times a day before meals  insulin lispro Injectable (HumaLOG) 6 Unit(s) SubCutaneous three times a day before meals  metoprolol succinate ER 50 milliGRAM(s) Oral daily  pantoprazole    Tablet 40 milliGRAM(s) Oral two times a day  phenytoin   Capsule 300 milliGRAM(s) Oral at bedtime  polyethylene glycol 3350 17 Gram(s) Oral daily  senna 2 Tablet(s) Oral at bedtime    MEDICATIONS  (PRN):  dextrose 40% Gel 15 Gram(s) Oral once PRN Blood Glucose LESS THAN 70 milliGRAM(s)/deciliter  glucagon  Injectable 1 milliGRAM(s) IntraMuscular once PRN Glucose LESS THAN 70 milligrams/deciliter  ondansetron Injectable 4 milliGRAM(s) IV Push every 8 hours PRN Nausea and/or Vomiting  petrolatum white Ointment 1 Application(s) Topical three times a day PRN Nasal dryness    Allergies    No Known Allergies    Intolerances        CONSTITUTIONAL: Sleeping but arousable. Alert on awakening.  EYES: +PERRL. Slight conjunctival pallor.  ENT: Slightly dry tongue. No abnormal lesions. No erythema. No pharyngeal exudates.   NECK: Supple.   RESPIRATORY: Slightly audible crackles. Improved airflow in R lung airfields, good airflow in left lung fields. No wheezes or rhonchi. No accessory muscle use. No apparent respiratory distress.  CARDIOVASCULAR: +S1/S2. No audible S3/S4. Extremities warm and well perfused. 1+ LE edema b/l.   GASTROINTESTINAL: Soft, nontender, nondistended. +BS. No rebound or guarding.   VASCULAR: 2+ radial pulses x b/l UE; 2+ DP pulses x b/l LE.  MUSCULOSKELETAL: Movement in all limbs.    DERMATOLOGICAL: No abnormal rashes or lesions. No evident hair, skin, or nail changes.  NEUROLOGICAL: CN 2-12 grossly intact. No focal deficits. Sensation intact x 4EXT. A&Ox3 (oriented to person, place, and time).                            9.2    8.40  )-----------( 267      ( 19 Oct 2019 10:32 )             30.8       10-20    142  |  103  |  24<H>  ----------------------------<  120<H>  4.6   |  28  |  1.59<H>    Ca    8.0<L>      20 Oct 2019 06:28  Phos  3.8     10-20  Mg     2.2     10-20    TPro  6.0  /  Alb  2.9<L>  /  TBili  0.1<L>  /  DBili  x   /  AST  50<H>  /  ALT  43  /  AlkPhos  137<H>  10-18    CAPILLARY BLOOD GLUCOSE      POCT Blood Glucose.: 201 mg/dL (19 Oct 2019 21:49)  POCT Blood Glucose.: 254 mg/dL (19 Oct 2019 17:20)  POCT Blood Glucose.: 206 mg/dL (19 Oct 2019 12:21)  POCT Blood Glucose.: 144 mg/dL (19 Oct 2019 07:52)    LIVER FUNCTIONS - ( 18 Oct 2019 07:23 )  Alb: 2.9 g/dL / Pro: 6.0 g/dL / ALK PHOS: 137 U/L / ALT: 43 U/L / AST: 50 U/L / GGT: x                       RADIOLOGY AND ADDITIONAL TESTS:    CONSULTANT NOTES REVIEWED:    CARE DISCUSSED WITH THE FOLLOWING CONSULTANTS/PROVIDERS:

## 2019-10-20 NOTE — PROGRESS NOTE ADULT - ASSESSMENT
89 yo M PMHx CAD s/p stents, CEA, PVD s/p stents, dementia, GI bleed presents sent in by Cardiologist for management of NSTEMI. Patient clinically stable, being followed by outpatient Cardiologist Dr. Holman. Newly febrile with elevated WBC, blood cultures drawn - Bcx positive for H. influenzae, on ceftriaxone. CXR demonstrates worsening pleural effusion likely 2/2 acute heart failure. Demonstrating clinical improvement. To be transitioned to oral antibiotics.

## 2019-10-20 NOTE — PROGRESS NOTE ADULT - SUBJECTIVE AND OBJECTIVE BOX
Subjective: Patient seen and examined. No new events except as noted.   Feels better less cough no fever or chilss  no cp or palpitations  MEDICATIONS:  MEDICATIONS  (STANDING):  amoxicillin  500 milliGRAM(s)/clavulanate 1 Tablet(s) Oral every 12 hours  aspirin enteric coated 81 milliGRAM(s) Oral daily  atorvastatin 80 milliGRAM(s) Oral at bedtime  cholecalciferol 1000 Unit(s) Oral daily  clopidogrel Tablet 75 milliGRAM(s) Oral daily  dextrose 50% Injectable 12.5 Gram(s) IV Push once  dextrose 50% Injectable 25 Gram(s) IV Push once  dextrose 50% Injectable 25 Gram(s) IV Push once  docusate sodium 100 milliGRAM(s) Oral three times a day  donepezil 10 milliGRAM(s) Oral at bedtime  insulin glargine Injectable (LANTUS) 12 Unit(s) SubCutaneous at bedtime  insulin lispro (HumaLOG) corrective regimen sliding scale   SubCutaneous at bedtime  insulin lispro (HumaLOG) corrective regimen sliding scale   SubCutaneous three times a day before meals  insulin lispro Injectable (HumaLOG) 6 Unit(s) SubCutaneous three times a day before meals  metoprolol succinate ER 50 milliGRAM(s) Oral daily  pantoprazole    Tablet 40 milliGRAM(s) Oral two times a day  phenytoin   Capsule 300 milliGRAM(s) Oral at bedtime  polyethylene glycol 3350 17 Gram(s) Oral daily  senna 2 Tablet(s) Oral at bedtime      PHYSICAL EXAM:  T(C): 37.7 (10-20-19 @ 13:03), Max: 37.7 (10-20-19 @ 13:03)  HR: 89 (10-20-19 @ 13:03) (76 - 89)  BP: 99/62 (10-20-19 @ 13:03) (99/62 - 147/55)  RR: 16 (10-20-19 @ 13:03) (16 - 17)  SpO2: 94% (10-20-19 @ 13:04) (89% - 95%)  Wt(kg): --  I&O's Summary    19 Oct 2019 07:01  -  20 Oct 2019 07:00  --------------------------------------------------------  IN: 720 mL / OUT: 850 mL / NET: -130 mL    20 Oct 2019 07:01  -  20 Oct 2019 17:07  --------------------------------------------------------  IN: 957 mL / OUT: 0 mL / NET: 957 mL          Appearance: Normal	awake alert  HEENT:   Normal oral mucosa, PERRL, EOMI	  Cardiovascular: Normal S1 S2, No JVD, No murmurs ,  Respiratory: Lungs decreased BS right base	  Gastrointestinal:  Soft, Non-tender, + BS	  Ext: No edema  Peripheral pulses palpable 2+ bilaterally      LABS:    CARDIAC MARKERS:                                8.4    9.02  )-----------( 278      ( 20 Oct 2019 08:34 )             28.1     10-20    142  |  103  |  24<H>  ----------------------------<  120<H>  4.6   |  28  |  1.59<H>    Ca    8.0<L>      20 Oct 2019 06:28  Phos  3.8     10-20  Mg     2.2     10-20      proBNP:   Lipid Profile:   HgA1c:   TSH:           TELEMETRY: 	    ECG:  	  RADIOLOGY:   DIAGNOSTIC TESTING:  [ ] Echocardiogram:  [ ]  Catheterization:  [ ] Stress Test:    OTHER:

## 2019-10-20 NOTE — PROGRESS NOTE ADULT - PROBLEM SELECTOR PLAN 4
- Initially prerenal due to sepsis and lasix, now ATN   - Increase in Cr > 30% from baseline  - FeUrea 48.2% - suggests intrinsic renal disease  - Ddx includes iatrogenic (recently started on lasix) vs infections (septic 2/2 pneumonia)  - Uptrending Cr  - Trend BMP for further increase in Cr

## 2019-10-20 NOTE — PROGRESS NOTE ADULT - ASSESSMENT
1.s/p NSTEMI cardiac stable no overt CHF or angina  2, acute on chronic renal insufficency  3.no further chest pain  4. anemia stable  5. decreased BS right base  6. hemodynamically stable    Recommend  toprol XL 50  treat infection as per ID  discharge planning rehab

## 2019-10-20 NOTE — PROGRESS NOTE ADULT - PROBLEM SELECTOR PLAN 2
- Septic (positive blood cultures)  - Repeat Bcx negative  - Ceftriaxone course completed, will start augmentin to complete total 14 day course

## 2019-10-20 NOTE — PROGRESS NOTE ADULT - PROBLEM SELECTOR PLAN 1
Acute on chronic systolic heart failure exacerbation.   - CXR demonstrates worsening pleural effusion; EF 32%  - Likely due to acute systolic HF (2/2 to fluid overload from antibiotic in fluid); unlikely parapneumonic effusion as patient is not toxic (afebrile, no leukocytosis)  - No diuresis yesterday given patient's clinical improvement  - Strict I&Os  - Daily weights  - Monitor BMP for increase in Cr  - Daily AM CXR

## 2019-10-21 ENCOUNTER — TRANSCRIPTION ENCOUNTER (OUTPATIENT)
Age: 84
End: 2019-10-21

## 2019-10-21 LAB
CULTURE RESULTS: SIGNIFICANT CHANGE UP
CULTURE RESULTS: SIGNIFICANT CHANGE UP
GLUCOSE BLDC GLUCOMTR-MCNC: 171 MG/DL — HIGH (ref 70–99)
GLUCOSE BLDC GLUCOMTR-MCNC: 243 MG/DL — HIGH (ref 70–99)
GLUCOSE BLDC GLUCOMTR-MCNC: 246 MG/DL — HIGH (ref 70–99)
GLUCOSE BLDC GLUCOMTR-MCNC: 249 MG/DL — HIGH (ref 70–99)
SPECIMEN SOURCE: SIGNIFICANT CHANGE UP
SPECIMEN SOURCE: SIGNIFICANT CHANGE UP

## 2019-10-21 PROCEDURE — 71045 X-RAY EXAM CHEST 1 VIEW: CPT | Mod: 26

## 2019-10-21 PROCEDURE — 99233 SBSQ HOSP IP/OBS HIGH 50: CPT | Mod: GC

## 2019-10-21 PROCEDURE — 99232 SBSQ HOSP IP/OBS MODERATE 35: CPT

## 2019-10-21 RX ADMIN — DONEPEZIL HYDROCHLORIDE 10 MILLIGRAM(S): 10 TABLET, FILM COATED ORAL at 22:11

## 2019-10-21 RX ADMIN — Medication 2: at 14:27

## 2019-10-21 RX ADMIN — Medication 50 MILLIGRAM(S): at 05:38

## 2019-10-21 RX ADMIN — SENNA PLUS 2 TABLET(S): 8.6 TABLET ORAL at 22:09

## 2019-10-21 RX ADMIN — Medication 1 TABLET(S): at 05:38

## 2019-10-21 RX ADMIN — INSULIN GLARGINE 12 UNIT(S): 100 INJECTION, SOLUTION SUBCUTANEOUS at 22:12

## 2019-10-21 RX ADMIN — Medication 2: at 18:08

## 2019-10-21 RX ADMIN — Medication 1 TABLET(S): at 17:08

## 2019-10-21 RX ADMIN — Medication 100 MILLIGRAM(S): at 05:38

## 2019-10-21 RX ADMIN — Medication 6 UNIT(S): at 09:13

## 2019-10-21 RX ADMIN — Medication 6 UNIT(S): at 14:27

## 2019-10-21 RX ADMIN — Medication 300 MILLIGRAM(S): at 22:10

## 2019-10-21 RX ADMIN — PANTOPRAZOLE SODIUM 40 MILLIGRAM(S): 20 TABLET, DELAYED RELEASE ORAL at 17:08

## 2019-10-21 RX ADMIN — Medication 1000 UNIT(S): at 12:51

## 2019-10-21 RX ADMIN — PANTOPRAZOLE SODIUM 40 MILLIGRAM(S): 20 TABLET, DELAYED RELEASE ORAL at 05:38

## 2019-10-21 RX ADMIN — Medication 100 MILLIGRAM(S): at 22:10

## 2019-10-21 RX ADMIN — Medication 81 MILLIGRAM(S): at 12:51

## 2019-10-21 RX ADMIN — Medication 1: at 09:14

## 2019-10-21 RX ADMIN — CLOPIDOGREL BISULFATE 75 MILLIGRAM(S): 75 TABLET, FILM COATED ORAL at 12:52

## 2019-10-21 RX ADMIN — Medication 100 MILLIGRAM(S): at 14:29

## 2019-10-21 RX ADMIN — Medication 6 UNIT(S): at 18:08

## 2019-10-21 RX ADMIN — ATORVASTATIN CALCIUM 80 MILLIGRAM(S): 80 TABLET, FILM COATED ORAL at 22:11

## 2019-10-21 NOTE — PROGRESS NOTE ADULT - PROBLEM SELECTOR PLAN 1
Acute on chronic systolic heart failure exacerbation.   - CXR demonstrates worsening pleural effusion; EF 32%  - Likely due to acute systolic HF (2/2 to fluid overload from antibiotic in fluid); unlikely parapneumonic effusion as patient is not toxic (afebrile, no leukocytosis)  - No diuresis yesterday given patient's clinical improvement  - Strict I&Os  - Daily weights  - Monitor BMP for increase in Cr  - Daily AM CXR - Likely acute on chronic systolic heart failure exacerbation due to fluid (TTE 10/2019 demonstrated EF 32%)  - S/p diuresis; clinically improved  - Monitor off supplemental oxygen

## 2019-10-21 NOTE — PROGRESS NOTE ADULT - ASSESSMENT
89 yo M PMHx CAD s/p stents, CEA, PVD s/p stents, dementia, GI bleed presents sent in by Cardiologist for management of chest pain and EKG changes  No leukocytosis, no fever  BCX with H influenzae pan S  CXR atelectasis  Likely respiratory source for H influenzae  Remains well, no fevers, no chills, repeat BCXs clear  Over weekend, CTX changed to Augmentin  Overall, Bacteremia, respiratory tract infection, leukocytosis  - Augmentin 500mg/125mg q 12 through 10/28/19  - After DC, will need to follow up with me in ID clinic for surveillance BCXs ([phone])--discussed with patient and daughter  - Monitor for further signs infection, further new complaints    Dieter Ford MD  Pager [phone]  After 5pm and on weekends call [phone]

## 2019-10-21 NOTE — PROGRESS NOTE ADULT - SUBJECTIVE AND OBJECTIVE BOX
Contact Information:  Sam Carias II, MD, MPH  PGY-1, Internal Medicine  Pager: [phone] ([facility]) /// [phone] ([facility])    HOME ADLER, MRN-[id number]    Patient is a 89y old  Male who presents with a chief complaint of NSTEMI (20 Oct 2019 17:06)      OVERNIGHT EVENTS:    SUBJECTIVE:    CONSTITUTIONAL: No weakness. No fatigue. No fever.  HEAD: No head trauma.   EYES: No vision changes.  ENT: No hearing changes or tinnitus. No ear pain. No changes in smell. No nasal congestion or discharge. No sore throat. No voice hoarseness.   NECK: No neck pain or stiffness. No lumps.  RESPIRATORY: No cough. No SOB. No wheezing. No hemoptysis.   CARDIOVASCULAR: No chest pain. No palpitations.   GASTROINTESTINAL: No dysphagia. No ABD pain. No distension. No constipation. No diarrhea. No pain with defecation. No hematemesis. No hematochezia or melena.  BACK: No back pain.  GENITOURINARY: No dysuria. No frequency or urgency. No hesitancy. No incontinence. No urinary retention. No suprapubic pain. No hematuria.  EXTREMITY: No swelling.  MUSCULOSKELETAL: No joint pain or swelling. No fractures. No stiffness.    SKIN: No rashes. No itching. No skin, hair, or nail changes.  NEUROLOGICAL: No weakness or paralysis. No lightheadedness or dizziness. No HA. No numbness or tingling.   PSYCHIATRIC: No depression.       OBJECTIVE:  Vital Signs Last 24 Hrs  T(C): 36.8 (21 Oct 2019 05:05), Max: 37.7 (20 Oct 2019 13:03)  T(F): 98.2 (21 Oct 2019 05:05), Max: 99.8 (20 Oct 2019 13:03)  HR: 84 (21 Oct 2019 05:05) (82 - 89)  BP: 132/72 (21 Oct 2019 05:05) (99/62 - 132/72)  BP(mean): --  RR: 16 (21 Oct 2019 00:28) (16 - 17)  SpO2: 96% (21 Oct 2019 05:05) (89% - 96%)  I&O's Summary    19 Oct 2019 07:01  -  20 Oct 2019 07:00  --------------------------------------------------------  IN: 720 mL / OUT: 850 mL / NET: -130 mL    20 Oct 2019 07:01  -  21 Oct 2019 06:42  --------------------------------------------------------  IN: 957 mL / OUT: 900 mL / NET: 57 mL        MEDICATIONS  (STANDING):  amoxicillin  500 milliGRAM(s)/clavulanate 1 Tablet(s) Oral every 12 hours  aspirin enteric coated 81 milliGRAM(s) Oral daily  atorvastatin 80 milliGRAM(s) Oral at bedtime  cholecalciferol 1000 Unit(s) Oral daily  clopidogrel Tablet 75 milliGRAM(s) Oral daily  dextrose 50% Injectable 12.5 Gram(s) IV Push once  dextrose 50% Injectable 25 Gram(s) IV Push once  dextrose 50% Injectable 25 Gram(s) IV Push once  docusate sodium 100 milliGRAM(s) Oral three times a day  donepezil 10 milliGRAM(s) Oral at bedtime  insulin glargine Injectable (LANTUS) 12 Unit(s) SubCutaneous at bedtime  insulin lispro (HumaLOG) corrective regimen sliding scale   SubCutaneous at bedtime  insulin lispro (HumaLOG) corrective regimen sliding scale   SubCutaneous three times a day before meals  insulin lispro Injectable (HumaLOG) 6 Unit(s) SubCutaneous three times a day before meals  metoprolol succinate ER 50 milliGRAM(s) Oral daily  pantoprazole    Tablet 40 milliGRAM(s) Oral two times a day  phenytoin   Capsule 300 milliGRAM(s) Oral at bedtime  polyethylene glycol 3350 17 Gram(s) Oral daily  senna 2 Tablet(s) Oral at bedtime    MEDICATIONS  (PRN):  dextrose 40% Gel 15 Gram(s) Oral once PRN Blood Glucose LESS THAN 70 milliGRAM(s)/deciliter  glucagon  Injectable 1 milliGRAM(s) IntraMuscular once PRN Glucose LESS THAN 70 milligrams/deciliter  ondansetron Injectable 4 milliGRAM(s) IV Push every 8 hours PRN Nausea and/or Vomiting  petrolatum white Ointment 1 Application(s) Topical three times a day PRN Nasal dryness    Allergies    No Known Allergies    Intolerances        CONSTITUTIONAL: No acute distress. Awake and alert.  HEAD: No evidence of trauma. Structures WNL.  EYES: +PERRL. +EOMI. No scleral icterus. No conjunctival injection.  ENT: Moist oral mucosa. No erythema. No pharyngeal exudates.   NECK: Supple. Appropriate ROM. No stiffness. No masses or lymphadenopathy.  RESPIRATORY: CTAB. No wheezes, rales, or rhonchi. No accessory muscle use. No apparent respiratory distress.  CARDIOVASCULAR: +S1/S2. No audible S3/S4. Regular rate and rhythm. No murmurs, rubs, or gallops. 2+ radial pulses x b/l UE; 2+ DP pulses x b/l LE.   GASTROINTESTINAL: Soft, nontender, nondistended. +BS. No rebound or guarding.   BACK: No spinal or paraspinal tenderness. No CVA tenderness.  EXTREMITY: No LE swelling or edema. EXTs warm to touch.  MUSCULOSKELETAL: Movement evident in all limbs. No tenderness on palpation.  DERMATOLOGICAL: No abnormal rashes or lesions.  NEUROLOGICAL: CN 2-12 grossly intact. No focal deficits. Sensation intact x 4EXT. A&Ox3 (oriented to person, place, and time).  PSYCHIATRIC: Appropriate affect.                            8.4    9.02  )-----------( 278      ( 20 Oct 2019 08:34 )             28.1       10-20    142  |  103  |  24<H>  ----------------------------<  120<H>  4.6   |  28  |  1.59<H>    Ca    8.0<L>      20 Oct 2019 06:28  Phos  3.8     10-20  Mg     2.2     10-20      CAPILLARY BLOOD GLUCOSE      POCT Blood Glucose.: 320 mg/dL (20 Oct 2019 21:42)  POCT Blood Glucose.: 163 mg/dL (20 Oct 2019 17:05)  POCT Blood Glucose.: 339 mg/dL (20 Oct 2019 12:57)  POCT Blood Glucose.: 127 mg/dL (20 Oct 2019 08:18)                  RADIOLOGY AND ADDITIONAL TESTS:    CONSULTANT NOTES REVIEWED:    CARE DISCUSSED WITH THE FOLLOWING CONSULTANTS/PROVIDERS: Contact Information:  Sam Carias II, MD, MPH  PGY-1, Internal Medicine  Pager: [phone] ([facility]) /// [phone] ([facility])    HOME ADLER, MRN-[id number]    Patient is a 89y old  Male who presents with a chief complaint of NSTEMI (20 Oct 2019 17:06)      OVERNIGHT EVENTS: No overnight events.    SUBJECTIVE: Patient found in bed sleeping, arousable and alert on awakening. No complaints. States that breathing is good off of supplemental oxygen.    CONSTITUTIONAL: No weakness. No fatigue. No fever.  HEAD: No head trauma.   EYES: No vision changes.  ENT: No hearing changes or tinnitus. No ear pain. No changes in smell. No nasal congestion or discharge. No sore throat. No voice hoarseness.   NECK: No neck pain or stiffness. No lumps.  RESPIRATORY: No cough. No SOB. No wheezing. No hemoptysis.   CARDIOVASCULAR: No chest pain. No palpitations.   GASTROINTESTINAL: No dysphagia. No ABD pain. No distension. No constipation. No diarrhea. No pain with defecation. No hematemesis. No hematochezia or melena.  BACK: No back pain.  GENITOURINARY: No dysuria. No frequency or urgency. No hesitancy. No incontinence. No urinary retention. No suprapubic pain. No hematuria.  EXTREMITY: No swelling.  MUSCULOSKELETAL: No joint pain or swelling. No fractures. No stiffness.    SKIN: No rashes. No itching. No skin, hair, or nail changes.  NEUROLOGICAL: No weakness or paralysis. No lightheadedness or dizziness. No HA. No numbness or tingling.   PSYCHIATRIC: No depression.       OBJECTIVE:  Vital Signs Last 24 Hrs  T(C): 36.8 (21 Oct 2019 05:05), Max: 37.7 (20 Oct 2019 13:03)  T(F): 98.2 (21 Oct 2019 05:05), Max: 99.8 (20 Oct 2019 13:03)  HR: 84 (21 Oct 2019 05:05) (82 - 89)  BP: 132/72 (21 Oct 2019 05:05) (99/62 - 132/72)  BP(mean): --  RR: 16 (21 Oct 2019 00:28) (16 - 17)  SpO2: 96% (21 Oct 2019 05:05) (89% - 96%)  I&O's Summary    19 Oct 2019 07:01  -  20 Oct 2019 07:00  --------------------------------------------------------  IN: 720 mL / OUT: 850 mL / NET: -130 mL    20 Oct 2019 07:01  -  21 Oct 2019 06:42  --------------------------------------------------------  IN: 957 mL / OUT: 900 mL / NET: 57 mL        MEDICATIONS  (STANDING):  amoxicillin  500 milliGRAM(s)/clavulanate 1 Tablet(s) Oral every 12 hours  aspirin enteric coated 81 milliGRAM(s) Oral daily  atorvastatin 80 milliGRAM(s) Oral at bedtime  cholecalciferol 1000 Unit(s) Oral daily  clopidogrel Tablet 75 milliGRAM(s) Oral daily  dextrose 50% Injectable 12.5 Gram(s) IV Push once  dextrose 50% Injectable 25 Gram(s) IV Push once  dextrose 50% Injectable 25 Gram(s) IV Push once  docusate sodium 100 milliGRAM(s) Oral three times a day  donepezil 10 milliGRAM(s) Oral at bedtime  insulin glargine Injectable (LANTUS) 12 Unit(s) SubCutaneous at bedtime  insulin lispro (HumaLOG) corrective regimen sliding scale   SubCutaneous at bedtime  insulin lispro (HumaLOG) corrective regimen sliding scale   SubCutaneous three times a day before meals  insulin lispro Injectable (HumaLOG) 6 Unit(s) SubCutaneous three times a day before meals  metoprolol succinate ER 50 milliGRAM(s) Oral daily  pantoprazole    Tablet 40 milliGRAM(s) Oral two times a day  phenytoin   Capsule 300 milliGRAM(s) Oral at bedtime  polyethylene glycol 3350 17 Gram(s) Oral daily  senna 2 Tablet(s) Oral at bedtime    MEDICATIONS  (PRN):  dextrose 40% Gel 15 Gram(s) Oral once PRN Blood Glucose LESS THAN 70 milliGRAM(s)/deciliter  glucagon  Injectable 1 milliGRAM(s) IntraMuscular once PRN Glucose LESS THAN 70 milligrams/deciliter  ondansetron Injectable 4 milliGRAM(s) IV Push every 8 hours PRN Nausea and/or Vomiting  petrolatum white Ointment 1 Application(s) Topical three times a day PRN Nasal dryness    Allergies    No Known Allergies    Intolerances        CONSTITUTIONAL: Sleeping but arousable. Alert on awakening.  EYES: +PERRL.   ENT: Moist tongue. No abnormal lesions. No erythema. No pharyngeal exudates.   NECK: Supple.   RESPIRATORY: Slightly audible crackles. Improved airflow in R lung airfields, good airflow in left lung fields. No wheezes or rhonchi. No accessory muscle use. No apparent respiratory distress.  CARDIOVASCULAR: +S1/S2. No audible S3/S4. Extremities warm and well perfused. 2+ radial pulses x b/l UE; 2+ DP pulses x b/l LE.  GASTROINTESTINAL: Soft, nontender, nondistended. +BS. No rebound or guarding.   MUSCULOSKELETAL: Movement in all limbs.    DERMATOLOGICAL: No abnormal rashes or lesions.   NEUROLOGICAL: CN 2-12 grossly intact. No focal deficits. Sensation intact x 4EXT. A&Ox3 (oriented to person, place, and time).                              8.4    9.02  )-----------( 278      ( 20 Oct 2019 08:34 )             28.1       10-20    142  |  103  |  24<H>  ----------------------------<  120<H>  4.6   |  28  |  1.59<H>    Ca    8.0<L>      20 Oct 2019 06:28  Phos  3.8     10-20  Mg     2.2     10-20      CAPILLARY BLOOD GLUCOSE      POCT Blood Glucose.: 320 mg/dL (20 Oct 2019 21:42)  POCT Blood Glucose.: 163 mg/dL (20 Oct 2019 17:05)  POCT Blood Glucose.: 339 mg/dL (20 Oct 2019 12:57)  POCT Blood Glucose.: 127 mg/dL (20 Oct 2019 08:18)                  RADIOLOGY AND ADDITIONAL TESTS:    CONSULTANT NOTES REVIEWED:    CARE DISCUSSED WITH THE FOLLOWING CONSULTANTS/PROVIDERS:

## 2019-10-21 NOTE — PROGRESS NOTE ADULT - PROBLEM SELECTOR PLAN 7
- CAD s/p stents, remote hx of MI  - ASA held last admission 2/2 GIB  - Presents with NSTEMI  - c/w ASA, Plavix  - Atorvastatin 80mg QHS  - Toprol 50 - CAD s/p stents, remote hx of MI  - Presents with NSTEMI, unlikely related to stent thrombosis  - c/w ASA, Plavix, atorvastatin, Toprol

## 2019-10-21 NOTE — PROGRESS NOTE ADULT - PROBLEM SELECTOR PLAN 5
- JACQUELINE in anterior leads, STD in precordial leads, diffuse on repeat EKG  - Troponin 1163 --> ST Depression MI  - Appreciate cardiology and CCU recommendations  - Given recent history of GIB will management conservatively without heparin gtt  - Trops peaked  - ASA 81 daily  - Plavix 75 mg daily   - C/w Toprol 50; cozaar dc'd given Cr increase  - C/w toprol XL 50  - TTE 2D - Moderate segmental LV systolic dysfunction  - Primary Cardiologist Dr. Holman following - JACQUELINE in anterior leads, STD in precordial leads, diffuse on repeat EKG --> likely demand ischemia due to anemia  - ASA 81 Plavix 75 mg, Toprol 50  - TTE 2D - Moderate segmental LV systolic dysfunction  - Per primary Cardiologist Dr. Holman - cleared for discharge

## 2019-10-21 NOTE — PROGRESS NOTE ADULT - PROBLEM SELECTOR PLAN 4
- Initially prerenal due to sepsis and lasix, now ATN   - Increase in Cr > 30% from baseline  - FeUrea 48.2% - suggests intrinsic renal disease  - Ddx includes iatrogenic (recently started on lasix) vs infections (septic 2/2 pneumonia)  - Cr stable  - Trend BMP - Initially prerenal due to sepsis and lasix, now ATN; FeUrea 48.2% --> intrarenal   - Cr stable; patient reports good urine  - Trend BMP

## 2019-10-21 NOTE — PROGRESS NOTE ADULT - ASSESSMENT
89 yo M PMHx CAD s/p stents, CEA, PVD s/p stents, dementia, GI bleed presents sent in by Cardiologist for management of NSTEMI. Patient clinically stable, being followed by outpatient Cardiologist Dr. Holman. Newly febrile with elevated WBC, blood cultures drawn - Bcx positive for H. influenzae, on ceftriaxone. CXR demonstrates worsening pleural effusion likely 2/2 acute heart failure. Demonstrating clinical improvement. To be transitioned to oral antibiotics. Patient to be discharged to rehab.

## 2019-10-21 NOTE — DISCHARGE NOTE PROVIDER - CARE PROVIDERS DIRECT ADDRESSES
,DirectAddress_Unknown,DirectAddress_Unknown ,DirectAddress_Unknown,DirectAddress_Unknown,[email or web address]

## 2019-10-21 NOTE — DISCHARGE NOTE PROVIDER - NSDCQMACEAOTHER_CARD_A_CORE_FT
Chronic kidney disease. Please follow up with primary care doctor and cardiologist to evaluate restarting ARB/ACE-I.

## 2019-10-21 NOTE — PROGRESS NOTE ADULT - SUBJECTIVE AND OBJECTIVE BOX
CC: F/U for Bacteremia    Saw/spoke to patient. Patient well. No new complaints. Daughter at bedside.    Allergies  No Known Allergies    ANTIMICROBIALS:  amoxicillin  500 milliGRAM(s)/clavulanate 1 every 12 hours    PE:    Vital Signs Last 24 Hrs  T(C): 36.8 (21 Oct 2019 13:01), Max: 36.9 (21 Oct 2019 00:28)  T(F): 98.2 (21 Oct 2019 13:01), Max: 98.4 (21 Oct 2019 00:28)  HR: 85 (21 Oct 2019 13:01) (82 - 85)  BP: 116/58 (21 Oct 2019 13:01) (108/58 - 132/72)  BP(mean): 77 (21 Oct 2019 13:01) (77 - 77)  RR: 18 (21 Oct 2019 13:01) (16 - 18)  SpO2: 91% (21 Oct 2019 13:01) (91% - 96%)    Gen: AOx3, NAD, non-toxic, pleasant  CV: S1+S2 normal, nontachycardic  Resp: Clear bilat, no resp distress, no crackles/wheezes  Abd: Soft, nontender, +BS  Ext: No LE edema, no wounds    LABS:                        8.4    9.02  )-----------( 278      ( 20 Oct 2019 08:34 )             28.1     10-20    142  |  103  |  24<H>  ----------------------------<  120<H>  4.6   |  28  |  1.59<H>    Ca    8.0<L>      20 Oct 2019 06:28  Phos  3.8     10-20  Mg     2.2     10-20    MICROBIOLOGY:    .Blood  10-16-19   No growth at 5 days.    .Urine  10-16-19   No growth    .Blood  10-14-19   No growth at 5 days.  --  Blood Culture PCR  Haemophilus influenzae    (otherwise reviewed)    RADIOLOGY:    10/21 XR:    FINDINGS:    Chronic elevation of the right hemidiaphragm.    Unchanged right lower lung patchy opacities likely atelectasis.    The remainder of the lungs are clear. No pleural effusions or   pneumothorax.    The heart size is normal.    IMPRESSION:    Partial right lower lobe atelectasis without significant change from   prior.    No pleural effusions.

## 2019-10-21 NOTE — PROGRESS NOTE ADULT - PROBLEM SELECTOR PLAN 8
- Home dependent Lantus 22U QHS, 10U premeal  - Most recent admission was Lantus 14U QHS, and 6U premeal  - Hypoglycemic on BMP  - Lantus 18U --> 12U, c/w Humalog 6 pre-meal  - BS stable - Home dependent Lantus 22U QHS, 10U premeal  - Most recent admission was Lantus 14U QHS, and 6U premeal  - Lantus 12U, c/w Humalog 6 pre-meal  - BS stable

## 2019-10-21 NOTE — DISCHARGE NOTE PROVIDER - NSDCCPCAREPLAN_GEN_ALL_CORE_FT
PRINCIPAL DISCHARGE DIAGNOSIS  Diagnosis: NSTEMI (non-ST elevated myocardial infarction)  Assessment and Plan of Treatment: NSTEMI (non-ST elevated myocardial infarction)      SECONDARY DISCHARGE DIAGNOSES  Diagnosis: Acute heart failure  Assessment and Plan of Treatment: Acute heart failure    Diagnosis: Haemophilus influenzae pneumonia  Assessment and Plan of Treatment: Haemophilus influenzae pneumonia    Diagnosis: Symptomatic anemia  Assessment and Plan of Treatment:     Diagnosis: NSTEMI (non-ST elevated myocardial infarction)  Assessment and Plan of Treatment: NSTEMI (non-ST elevated myocardial infarction)    Diagnosis: DM (Diabetes Mellitus)  Assessment and Plan of Treatment: DM (Diabetes Mellitus)    Diagnosis: Essential hypertension  Assessment and Plan of Treatment: Essential hypertension    Diagnosis: Acute tubular necrosis  Assessment and Plan of Treatment: Acute tubular necrosis PRINCIPAL DISCHARGE DIAGNOSIS  Diagnosis: NSTEMI (non-ST elevated myocardial infarction)  Assessment and Plan of Treatment: You were admitted because of symptoms of chest pain and EKG suggestive of heart attack. Your cardiac enzymes were assessed and trended; your EKGs demonstrated abnormalities but nothing suggestive of a heart attack. It is likely that your chest symptoms were due to low blood counts that caused your heart to hurt when exerting itself. You were maintained on your aspirin plavix, metoprolol, and statin. Your ARB was held because it caused a decrease in kidney function. You should follow-up with your cardiologist to ensure that you kidney function is OK to restart the ARB.  Steven Holman)  Cardiovascular Disease; Internal Medicine; Interventional Cardiology  [address] [place] [address]  Phone: [phone]  Fax: [phone]  Follow Up Time: 1 week      SECONDARY DISCHARGE DIAGNOSES  Diagnosis: Acute heart failure  Assessment and Plan of Treatment: During your hospitalization, you were noted to have some difficulty breathing. You had chest Xrays that demonstrated increased fluid. You received medications to get the water off of your lungs. You demonstrated improvement. You are advised to follow up with your cardiologist to ensure resolution and adequate control of your fluid status.  Steven Holman)  Cardiovascular Disease; Internal Medicine; Interventional Cardiology  [address] [place] [address]  Phone: [phone]  Fax: [phone]  Follow Up Time: 1 week    Diagnosis: Haemophilus influenzae pneumonia  Assessment and Plan of Treatment: You were admitted because of an abnormal EKG and chest pain. While hospitalized, you noted a cough and were found to have a bacterium in your blood. You were treated with IV antibiotics and transitioned to oral antibiotics. You improved. You are to continue taking the oral antibiotic (Augmentin 500 twice a day) until October 10/28. You are advised to follow up with Infectious Disease (Dr. Ford):  Dieter Ford)  Infectious Disease; Internal Medicine  [address] [place] [address]  Phone: [phone]  Fax: [phone]  Follow Up Time: 2 weeks    Diagnosis: Symptomatic anemia  Assessment and Plan of Treatment: You were admitted because of chest pain and abnormal EKG. While evaluated you were noted to have low blood counts. It is likely that your chest pain and abnormal EKG are the result of your anemia, which causes your heart to hurt with activity. You were given a unit of blood on admission and your blood counts were trended. Gastroenterology was consulted and they recommended that you continue with your proton pump inhibitor. You are advised to continue taking your proton pump inhibitor (pantoprazole). You are advised to follow up with your outpatient primary doctor.  Gilma Jacobs)  Internal Medicine  [address] [place] [address], [address] [place] [address]  Phone: [phone]  Fax: [phone]  Follow Up Time: 1 week    Diagnosis: DM (Diabetes Mellitus)  Assessment and Plan of Treatment: You have a history of diabetes. You were managed on insulin, which needed to be adjusted to ensure control of your sugars. You are advised to continue your home insulin dose and follow up with your primary care doctor to ensure control of your sugar levels. If necessary you should obtain a consult for an endocrinologist evaluation:  Gilma Jacobs)  Internal Medicine  [address] [place] [address], [place] [address]  Phone: [phone]  Fax: [phone]  Follow Up Time: 1 week      Diagnosis: Acute tubular necrosis  Assessment and Plan of Treatment: You have a history of chronic kidney disease. You were started on lasix because of your heart failure but it was discontinued because of decreasing renal function. You were also start on cozaar but it too was discontinued because of decreasing renal function. Your renal function was trended and was noted to be trending back towards normal. You are advised to follow up with your cardiologist regarding your ARB medications. You are alsoo advised to follow up with your primary care doctor:  Steven Holman)  Cardiovascular Disease; Internal Medicine; Interventional Cardiology  [address] [place] [address]  Phone: [phone]  Fax: [phone]  Follow Up Time: 1 week  Gilma Jacobs)  Internal Medicine  [address] [place] [address], [address] [place] [address]  Phone: [phone]  Fax: [phone]  Follow Up Time: 1 week

## 2019-10-21 NOTE — DISCHARGE NOTE PROVIDER - CARE PROVIDER_API CALL
Steven Holman)  Cardiovascular Disease; Internal Medicine; Interventional Cardiology  [address] [place] [address]  Phone: [phone]  Fax: [phone]  Follow Up Time: 1 week    Gilma Jacobs)  Internal Medicine  [address] [facility], [address]  [place] [address]  Phone: [phone]  Fax: [phone]  Follow Up Time: 1 week Steven Holman)  Cardiovascular Disease; Internal Medicine; Interventional Cardiology  [address] [place] [address]  Phone: [phone]  Fax: [phone]  Follow Up Time: 1 week    Gilma Jacobs)  Internal Medicine  [address] [facility], [address]  [place] [address]  Phone: [phone]  Fax: [phone]  Follow Up Time: 1 week    Dieter Ford)  Infectious Disease; Internal Medicine  [address] [place] [address]  Phone: [phone]  Fax: [phone]  Follow Up Time: 2 weeks

## 2019-10-21 NOTE — DISCHARGE NOTE PROVIDER - NSDCFUADDAPPT_GEN_ALL_CORE_FT
You are advised to schedule appointments with the following specialists:    Steven Holman)  Cardiovascular Disease; Internal Medicine; Interventional Cardiology  [address] [place] [address]  Phone: [phone]  Fax: [phone]  Follow Up Time: 1 week    Gilma Jacobs)  Internal Medicine  [address] [facility], [address]  [place] [address]  Phone: [phone]  Fax: [phone]  Follow Up Time: 1 week You are advised to schedule appointments with the following specialists:    Steven Holman)  Cardiovascular Disease; Internal Medicine; Interventional Cardiology  [address] [place] [address]  Phone: [phone]  Fax: [phone]  Follow Up Time: 1 week    Gilma Jacobs)  Internal Medicine  [address] [facility], [address]  [place] [address]  Phone: [phone]  Fax: [phone]  Follow Up Time: 1 week    Dieter Ford)  Infectious Disease; Internal Medicine  [address] [place] [address]  Phone: [phone]  Fax: [phone]  Follow Up Time: 2 weeks

## 2019-10-21 NOTE — DISCHARGE NOTE PROVIDER - NSDCCPTREATMENT_GEN_ALL_CORE_FT
PRINCIPAL PROCEDURE  Procedure: Transthoracic echo  Findings and Treatment: Dimensions:    Normal Values:  LA:     3.5    2.0 - 4.0 cm  Ao:     3.8    2.0 - 3.8 cm  SEPTUM: 1.0    0.6 -1.2 cm  PWT:    1.0    0.6 - 1.1 cm  LVIDd:  4.7    3.0 - 5.6 cm  LVIDs:  4.0    1.8 - 4.0 cm  Derived variables:  LVMI: 87 g/m2  RWT: 0.42  Fractional short: 15 %  EF (Teicholtz): 32 %  ------------------------------------------------------------------------  Observations:  Mitral Valve: Mitral annular calcification, otherwise  normal mitral valve. Minimal mitral regurgitation.  Aortic Valve/Aorta: Calcified aortic valve with probably  normal opening.  Normal aortic root size. (Ao: 3.8 cm at thesinuses of  Valsalva).  Left Atrium: Moderately dilated left atrium.  LA volume  index = 46 cc/m2.  Left Ventricle: Moderate segmental left ventricular  systolic dysfunction. The basal to mid lateral and basal to  mid anterior walls are best preserved, all other walls are  hypokinetic.  Endocardial visualization enhanced with  intravenous injection of Ultrasonic Enhancing Agent  (Definity). Normal left ventricular internal dimensions and  wall thicknesses.  Right Heart: Normal right atrium. The right ventricle is  not well visualized grossly enlarged. Normal tricuspid  valve. Normal pulmonic valve.  Pericardium/Pleura: Normal pericardium with no pericardial  effusion.  Hemodynamic: Estimated right atrial pressure is 8 mm Hg.  ------------------------------------------------------------------------  Conclusions:  1. Mitral annular calcification, otherwise normal mitral  valve.  2. Calcified aortic valve with probably normal opening.  3. Moderately dilated left atrium.  LA volume index = 46  cc/m2.  4. Moderate segmental left ventricular systolic  dysfunction. The basal to mid lateral and basal to mid  anterior walls are best preserved, all other walls are  hypokinetic.  Endocardial visualization enhanced with  intravenous injection of Ultrasonic Enhancing Agent  (Definity).  5. The right ventricle is not well visualized grossly  enlarged.

## 2019-10-21 NOTE — PROGRESS NOTE ADULT - PROBLEM SELECTOR PLAN 6
- Recently admitted for workup of melena concerning for UGIB; c/w ASA, plavix. Hgb stable so no intervention. Reports dark stools  - Received s/p 1U PRBC in ED  - GI consulted - no plan for endoscopy  - Monitor CBC; VSQ4hr  - Transfuse prn Hgb<8  - Protonix PO 40mg BID - Recently admitted for workup of melena concerning for UGIB; c/w ASA, plavix, no intervention.  - C/o darks tools, received s/p 1U PRBC in ED  - Hgb stable  - Protonix PO 40mg BID

## 2019-10-21 NOTE — DISCHARGE NOTE PROVIDER - HOSPITAL COURSE
History of Present Illness    89 yo M PMHx CAD s/p stents, CEA, PVD s/p stents, dementia, GI bleed presents sent in by Cardiologist for management of chest pain and EKG changes. Presented to his cardiologist with complaints of acute on chronic chest pain for one month and dark stools. He describes the chest pain as a bandlike squeezing pain across his chest with radiation down his left arm. EKG was performed at the cardiologist's office significant for JACQUELINE in the anterior leads and STD in the precordial leads. EMS was called due to concern for NSTEMI. ASA 81 administered in ED. Chest pain resolved after arrival to the ED. Currently denying any chest pain. Denies pleurisy, SOB, leg swelling, palpitation, or orthopnea.         Of note recently discharged on 10/9, for workup of melena, and chest pain. Chest pain was thought to be of stable angina. Patient was on  and plavix, H/H remained stable at baseline, no GI intervention. ASA was discontinued upon discharge.         Emergency Department    In the ED    VS: Tmax 98.1F, HR 79-93, -150/64-75, RR 15-20, spO2 %    Received: 1 unit PRBC, ASA 81        Hospital Course    Patient was brought to the floors and monitored on telemetry. His troponins were trended until they peaked. EKGs demonstrated T-wave abnormalities and RBBB. An echo demonstrated EF 32% and moderate systolic dysfunction. The patient's cardiologist, Dr. Steven Holman, was involved in the patient's inhospital care. He was continued on his aspirin, plavix, and beta blocker. The patient remained stable and was monitored for any further cardiac events. It is likely that his NSTEMI was a result of demand ischemia given his anemia on presentation. Gastroenterology was consulted and they recommended careful trending of hemoglobin. Patient was started on lasix but it was held due to UDAY on CKD. The hemoglobin remained stable and no further transfusions were needed. Hospital course was complicated by H. influenzae pneumonia and sepsis. He was treated with ceftriaxone and azithromycin and then eventually transitioned to augmentin. He was then noted to have increased pleural effusions likely due to acute systolic heart failure due to the administration of antibiotics. He was given Lasix daily with the goal of keeping him negative at least 1 liter and daily Xrays were obtained to monitor the resolution of the pleural effusions. His output and his BMP were closely monitored. The patient demonstrated improvement and he no longer required diuresis. The patient was also managed for his diabetes; his insulin was adjusted to ensure that it remained within acceptable parameters. The patient was maintained on oxygen throughout his hospital course and was observed until he was able to maintain oxygen saturation above 93% while ambulating.        On the day of discharge, the patient was determined to be clinically stable; he was assessed to be an appropriate candidate for discharge. History of Present Illness    89 yo M PMHx CAD s/p stents, CEA, PVD s/p stents, dementia, GI bleed presents sent in by Cardiologist for management of chest pain and EKG changes. Presented to his cardiologist with complaints of acute on chronic chest pain for one month and dark stools. He describes the chest pain as a bandlike squeezing pain across his chest with radiation down his left arm. EKG was performed at the cardiologist's office significant for JACQUELINE in the anterior leads and STD in the precordial leads. EMS was called due to concern for NSTEMI. ASA 81 administered in ED. Chest pain resolved after arrival to the ED. Currently denying any chest pain. Denies pleurisy, SOB, leg swelling, palpitation, or orthopnea.         Of note recently discharged on 10/9, for workup of melena, and chest pain. Chest pain was thought to be of stable angina. Patient was on  and plavix, H/H remained stable at baseline, no GI intervention. ASA was discontinued upon discharge.         Emergency Department    In the ED    VS: Tmax 98.1F, HR 79-93, -150/64-75, RR 15-20, spO2 %    Received: 1 unit PRBC, ASA 81        Hospital Course    Patient was brought to the floors and monitored on telemetry. His troponins were trended until they peaked. EKGs demonstrated T-wave abnormalities and RBBB. An echo demonstrated EF 32% and moderate systolic dysfunction. The patient's cardiologist, Dr. Steven Holman, was involved in the patient's inhospital care. He was continued on his aspirin, plavix, and beta blocker. The patient remained stable and was monitored for any further cardiac events. It is likely that his NSTEMI was a result of demand ischemia given his anemia on presentation. Gastroenterology was consulted and they recommended careful trending of hemoglobin. Patient was started on lasix but it was held due to UDAY on CKD. The hemoglobin remained stable and no further transfusions were needed. Hospital course was complicated by H. influenzae pneumonia and sepsis. He was treated with ceftriaxone and azithromycin and then eventually transitioned to augmentin. He was then noted to have increased pleural effusions likely due to acute systolic heart failure due to the administration of antibiotics. He was given Lasix daily with the goal of keeping him negative at least 1 liter and daily Xrays were obtained to monitor the resolution of the pleural effusions. His output and his BMP were closely monitored. The patient demonstrated improvement and he no longer required diuresis. The patient was also managed for his diabetes; his insulin was adjusted to ensure that it remained within acceptable parameters. The patient was maintained on oxygen throughout his hospital course and was observed until he was able to maintain oxygen saturation above 93% while ambulating.        #Patient is advised to continue taking augmentin through October 28th (13 more doses as he got an AM dose on 10/22).    #Patient advised to continue taking bowel regimen for constipation    #Patient advised to continue with home insulin regimen with close sugar monitoring    #Patient advised to continue with toprol xl 50 as per cardiologist.        On the day of discharge, the patient was determined to be clinically stable; he was assessed to be an appropriate candidate for discharge. 89 yo M PMHx CAD s/p stents, CEA, PVD s/p stents, dementia, GI bleed sent in by Cardiologist for management of chest pain and EKG changes. Presented to his cardiologist with complaints of acute on chronic chest pain for one month and dark stools. He described the chest pain as a bandlike squeezing pain across his chest with radiation down his left arm. EKG was performed at the cardiologist's office significant for JACQUELINE in the anterior leads and STD in the precordial leads. EMS was called due to concern for NSTEMI. ASA 81 administered in ED. Chest pain resolved after arrival to the ED.         Of note recently discharged on 10/9, for workup of melena, and chest pain. Chest pain was thought to be of stable angina. Patient was on  and plavix, H/H remained stable at baseline, no GI intervention. ASA was discontinued upon discharge.         Emergency Department    In the ED    VS: Tmax 98.1F, HR 79-93, -150/64-75, RR 15-20, spO2 %    Received: 1 unit PRBC, ASA 81        Hospital Course    Admitted to telemetry. His troponins were trended until they peaked. EKGs demonstrated T-wave abnormalities and RBBB. An echo demonstrated EF 32% and moderate systolic dysfunction. Anemic at 7.5 on admission, given 1 unit. No heparin gtt given due to concern for bleed.        The patient's cardiologist, Dr. Steven Holman, was involved in the patient's in-hospital care. He was continued on his aspirin, plavix, and beta blocker. The patient remained stable and was monitored for any further cardiac events. It is likely that his NSTEMI was a result of demand ischemia given his anemia on presentation. Gastroenterology was consulted and they recommended careful trending of hemoglobin. The hemoglobin remained stable and no further transfusions were needed.         Pt developed fever, blood cx positive for H. influenza. Treated for H influenza pneumonia and bacteremia. Given ceftriaxone and azithromycin and then eventually transitioned to Augmentin. Rpt cx negative. Followed by ID.         He was noted to have increased pleural effusions likely due to acute systolic heart failure. He was given Lasix daily with the goal of keeping him negative at least 1 liter and daily Xrays were obtained to monitor the resolution of the pleural effusions. His output and his BMP were closely monitored. The patient demonstrated improvement and he no longer required diuresis.         The patient was also managed for his diabetes; his insulin was adjusted to ensure that it remained within acceptable parameters. The patient was maintained on oxygen throughout his hospital course and was observed until he was able to maintain oxygen saturation above 93% while ambulating.        #Patient is advised to continue taking augmentin through October 28th (13 more doses as he got an AM dose on 10/22).        Discharged to rehab w f/u.     Diagnoses: NSTEMI; Bacterial pneumonia; H influenza bacteremia; Acute systolic CHF; Acute blood loss anemia; DM2; UDAY; CKD 3; Metabolic acidosis; Hypoxia; Vitamin D deficiency; CAD; Dementia

## 2019-10-22 ENCOUNTER — TRANSCRIPTION ENCOUNTER (OUTPATIENT)
Age: 84
End: 2019-10-22

## 2019-10-22 VITALS — WEIGHT: 153.88 LBS

## 2019-10-22 PROBLEM — R56.9 UNSPECIFIED CONVULSIONS: Chronic | Status: ACTIVE | Noted: 2019-10-12

## 2019-10-22 LAB
GLUCOSE BLDC GLUCOMTR-MCNC: 174 MG/DL — HIGH (ref 70–99)
GLUCOSE BLDC GLUCOMTR-MCNC: 196 MG/DL — HIGH (ref 70–99)

## 2019-10-22 PROCEDURE — 99239 HOSP IP/OBS DSCHRG MGMT >30: CPT

## 2019-10-22 RX ORDER — POLYETHYLENE GLYCOL 3350 17 G/17G
17 POWDER, FOR SOLUTION ORAL
Qty: 0 | Refills: 0 | DISCHARGE
Start: 2019-10-22

## 2019-10-22 RX ORDER — PANTOPRAZOLE SODIUM 20 MG/1
1 TABLET, DELAYED RELEASE ORAL
Qty: 0 | Refills: 0 | DISCHARGE
Start: 2019-10-22

## 2019-10-22 RX ORDER — DOCUSATE SODIUM 100 MG
1 CAPSULE ORAL
Qty: 0 | Refills: 0 | DISCHARGE
Start: 2019-10-22

## 2019-10-22 RX ORDER — METOPROLOL TARTRATE 50 MG
1 TABLET ORAL
Qty: 0 | Refills: 0 | DISCHARGE
Start: 2019-10-22

## 2019-10-22 RX ORDER — SENNA PLUS 8.6 MG/1
2 TABLET ORAL
Qty: 0 | Refills: 0 | DISCHARGE
Start: 2019-10-22

## 2019-10-22 RX ORDER — ASPIRIN/CALCIUM CARB/MAGNESIUM 324 MG
1 TABLET ORAL
Qty: 0 | Refills: 0 | DISCHARGE
Start: 2019-10-22

## 2019-10-22 RX ADMIN — Medication 1000 UNIT(S): at 12:30

## 2019-10-22 RX ADMIN — Medication 81 MILLIGRAM(S): at 12:30

## 2019-10-22 RX ADMIN — Medication 1: at 10:50

## 2019-10-22 RX ADMIN — PANTOPRAZOLE SODIUM 40 MILLIGRAM(S): 20 TABLET, DELAYED RELEASE ORAL at 06:11

## 2019-10-22 RX ADMIN — Medication 50 MILLIGRAM(S): at 06:11

## 2019-10-22 RX ADMIN — Medication 6 UNIT(S): at 10:00

## 2019-10-22 RX ADMIN — Medication 100 MILLIGRAM(S): at 06:11

## 2019-10-22 RX ADMIN — Medication 1 TABLET(S): at 06:11

## 2019-10-22 RX ADMIN — CLOPIDOGREL BISULFATE 75 MILLIGRAM(S): 75 TABLET, FILM COATED ORAL at 12:30

## 2019-10-22 RX ADMIN — POLYETHYLENE GLYCOL 3350 17 GRAM(S): 17 POWDER, FOR SOLUTION ORAL at 12:30

## 2019-10-22 NOTE — PROGRESS NOTE ADULT - SUBJECTIVE AND OBJECTIVE BOX
Contact Information:  Sam Carias II, MD, MPH  PGY-1, Internal Medicine  Pager: [phone] ([facility]) /// [phone] ([facility])    HOME ADLER, MRN-[id number]    Patient is a 89y old  Male who presents with a chief complaint of NSTEMI (21 Oct 2019 09:57)      OVERNIGHT EVENTS:    SUBJECTIVE:    CONSTITUTIONAL: No weakness. No fatigue. No fever.  HEAD: No head trauma.   EYES: No vision changes.  ENT: No hearing changes or tinnitus. No ear pain. No changes in smell. No nasal congestion or discharge. No sore throat. No voice hoarseness.   NECK: No neck pain or stiffness. No lumps.  RESPIRATORY: No cough. No SOB. No wheezing. No hemoptysis.   CARDIOVASCULAR: No chest pain. No palpitations.   GASTROINTESTINAL: No dysphagia. No ABD pain. No distension. No constipation. No diarrhea. No pain with defecation. No hematemesis. No hematochezia or melena.  BACK: No back pain.  GENITOURINARY: No dysuria. No frequency or urgency. No hesitancy. No incontinence. No urinary retention. No suprapubic pain. No hematuria.  EXTREMITY: No swelling.  MUSCULOSKELETAL: No joint pain or swelling. No fractures. No stiffness.    SKIN: No rashes. No itching. No skin, hair, or nail changes.  NEUROLOGICAL: No weakness or paralysis. No lightheadedness or dizziness. No HA. No numbness or tingling.   PSYCHIATRIC: No depression.       OBJECTIVE:  Vital Signs Last 24 Hrs  T(C): 36.7 (22 Oct 2019 04:34), Max: 36.9 (22 Oct 2019 00:07)  T(F): 98.1 (22 Oct 2019 04:34), Max: 98.5 (22 Oct 2019 00:07)  HR: 78 (22 Oct 2019 04:34) (78 - 85)  BP: 119/69 (22 Oct 2019 04:34) (101/60 - 119/69)  BP(mean): 77 (21 Oct 2019 13:01) (77 - 77)  RR: 18 (22 Oct 2019 04:34) (18 - 18)  SpO2: 91% (22 Oct 2019 04:34) (91% - 92%)  I&O's Summary    20 Oct 2019 07:01  -  21 Oct 2019 07:00  --------------------------------------------------------  IN: 957 mL / OUT: 900 mL / NET: 57 mL    21 Oct 2019 07:01  -  22 Oct 2019 06:19  --------------------------------------------------------  IN: 240 mL / OUT: 550 mL / NET: -310 mL        MEDICATIONS  (STANDING):  amoxicillin  500 milliGRAM(s)/clavulanate 1 Tablet(s) Oral every 12 hours  aspirin enteric coated 81 milliGRAM(s) Oral daily  atorvastatin 80 milliGRAM(s) Oral at bedtime  cholecalciferol 1000 Unit(s) Oral daily  clopidogrel Tablet 75 milliGRAM(s) Oral daily  dextrose 50% Injectable 12.5 Gram(s) IV Push once  dextrose 50% Injectable 25 Gram(s) IV Push once  dextrose 50% Injectable 25 Gram(s) IV Push once  docusate sodium 100 milliGRAM(s) Oral three times a day  donepezil 10 milliGRAM(s) Oral at bedtime  insulin glargine Injectable (LANTUS) 12 Unit(s) SubCutaneous at bedtime  insulin lispro (HumaLOG) corrective regimen sliding scale   SubCutaneous at bedtime  insulin lispro (HumaLOG) corrective regimen sliding scale   SubCutaneous three times a day before meals  insulin lispro Injectable (HumaLOG) 6 Unit(s) SubCutaneous three times a day before meals  metoprolol succinate ER 50 milliGRAM(s) Oral daily  pantoprazole    Tablet 40 milliGRAM(s) Oral two times a day  phenytoin   Capsule 300 milliGRAM(s) Oral at bedtime  polyethylene glycol 3350 17 Gram(s) Oral daily  senna 2 Tablet(s) Oral at bedtime    MEDICATIONS  (PRN):  dextrose 40% Gel 15 Gram(s) Oral once PRN Blood Glucose LESS THAN 70 milliGRAM(s)/deciliter  glucagon  Injectable 1 milliGRAM(s) IntraMuscular once PRN Glucose LESS THAN 70 milligrams/deciliter  ondansetron Injectable 4 milliGRAM(s) IV Push every 8 hours PRN Nausea and/or Vomiting  petrolatum white Ointment 1 Application(s) Topical three times a day PRN Nasal dryness    Allergies    No Known Allergies    Intolerances        CONSTITUTIONAL: No acute distress. Awake and alert.  HEAD: No evidence of trauma. Structures WNL.  EYES: +PERRL. +EOMI. No scleral icterus. No conjunctival injection.  ENT: Moist oral mucosa. No erythema. No pharyngeal exudates.   NECK: Supple. Appropriate ROM. No stiffness. No masses or lymphadenopathy.  RESPIRATORY: CTAB. No wheezes, rales, or rhonchi. No accessory muscle use. No apparent respiratory distress.  CARDIOVASCULAR: +S1/S2. No audible S3/S4. Regular rate and rhythm. No murmurs, rubs, or gallops. 2+ radial pulses x b/l UE; 2+ DP pulses x b/l LE.   GASTROINTESTINAL: Soft, nontender, nondistended. +BS. No rebound or guarding.   BACK: No spinal or paraspinal tenderness. No CVA tenderness.  EXTREMITY: No LE swelling or edema. EXTs warm to touch.  MUSCULOSKELETAL: Movement evident in all limbs. No tenderness on palpation.  DERMATOLOGICAL: No abnormal rashes or lesions.  NEUROLOGICAL: CN 2-12 grossly intact. No focal deficits. Sensation intact x 4EXT. A&Ox3 (oriented to person, place, and time).  PSYCHIATRIC: Appropriate affect.                            8.4    9.02  )-----------( 278      ( 20 Oct 2019 08:34 )             28.1       10-20    142  |  103  |  24<H>  ----------------------------<  120<H>  4.6   |  28  |  1.59<H>    Ca    8.0<L>      20 Oct 2019 06:28  Phos  3.8     10-20  Mg     2.2     10-20      CAPILLARY BLOOD GLUCOSE      POCT Blood Glucose.: 249 mg/dL (21 Oct 2019 21:05)  POCT Blood Glucose.: 246 mg/dL (21 Oct 2019 17:38)  POCT Blood Glucose.: 243 mg/dL (21 Oct 2019 14:05)  POCT Blood Glucose.: 171 mg/dL (21 Oct 2019 09:00)                  RADIOLOGY AND ADDITIONAL TESTS:    CONSULTANT NOTES REVIEWED:    CARE DISCUSSED WITH THE FOLLOWING CONSULTANTS/PROVIDERS: Contact Information:  Sam Carias II, MD, MPH  PGY-1, Internal Medicine  Pager: [phone] ([facility]) /// [phone] ([facility])    HOME ADLER, MRN-[id number]    Patient is a 89y old  Male who presents with a chief complaint of NSTEMI (21 Oct 2019 09:57)      OVERNIGHT EVENTS: No overnight events.    SUBJECTIVE: Patient evaluated at bedside. No acute complaints.    CONSTITUTIONAL: No weakness. No fatigue. No fever.  HEAD: No head trauma.   EYES: No vision changes.  ENT: No hearing changes or tinnitus. No ear pain. No changes in smell. No nasal congestion or discharge. No sore throat. No voice hoarseness.   NECK: No neck pain or stiffness. No lumps.  RESPIRATORY: No cough. No SOB. No wheezing. No hemoptysis.   CARDIOVASCULAR: No chest pain. No palpitations.   GASTROINTESTINAL: No dysphagia. No ABD pain. No distension. No constipation. No diarrhea. No pain with defecation. No hematemesis. No hematochezia or melena.  BACK: No back pain.  GENITOURINARY: No dysuria. No frequency or urgency. No hesitancy. No incontinence. No urinary retention. No suprapubic pain. No hematuria.  EXTREMITY: No swelling.  MUSCULOSKELETAL: No joint pain or swelling. No fractures. No stiffness.    SKIN: No rashes. No itching. No skin, hair, or nail changes.  NEUROLOGICAL: No weakness or paralysis. No lightheadedness or dizziness. No HA. No numbness or tingling.   PSYCHIATRIC: No depression.       OBJECTIVE:  Vital Signs Last 24 Hrs  T(C): 36.7 (22 Oct 2019 04:34), Max: 36.9 (22 Oct 2019 00:07)  T(F): 98.1 (22 Oct 2019 04:34), Max: 98.5 (22 Oct 2019 00:07)  HR: 78 (22 Oct 2019 04:34) (78 - 85)  BP: 119/69 (22 Oct 2019 04:34) (101/60 - 119/69)  BP(mean): 77 (21 Oct 2019 13:01) (77 - 77)  RR: 18 (22 Oct 2019 04:34) (18 - 18)  SpO2: 91% (22 Oct 2019 04:34) (91% - 92%)  I&O's Summary    20 Oct 2019 07:01  -  21 Oct 2019 07:00  --------------------------------------------------------  IN: 957 mL / OUT: 900 mL / NET: 57 mL    21 Oct 2019 07:01  -  22 Oct 2019 06:19  --------------------------------------------------------  IN: 240 mL / OUT: 550 mL / NET: -310 mL        MEDICATIONS  (STANDING):  amoxicillin  500 milliGRAM(s)/clavulanate 1 Tablet(s) Oral every 12 hours  aspirin enteric coated 81 milliGRAM(s) Oral daily  atorvastatin 80 milliGRAM(s) Oral at bedtime  cholecalciferol 1000 Unit(s) Oral daily  clopidogrel Tablet 75 milliGRAM(s) Oral daily  dextrose 50% Injectable 12.5 Gram(s) IV Push once  dextrose 50% Injectable 25 Gram(s) IV Push once  dextrose 50% Injectable 25 Gram(s) IV Push once  docusate sodium 100 milliGRAM(s) Oral three times a day  donepezil 10 milliGRAM(s) Oral at bedtime  insulin glargine Injectable (LANTUS) 12 Unit(s) SubCutaneous at bedtime  insulin lispro (HumaLOG) corrective regimen sliding scale   SubCutaneous at bedtime  insulin lispro (HumaLOG) corrective regimen sliding scale   SubCutaneous three times a day before meals  insulin lispro Injectable (HumaLOG) 6 Unit(s) SubCutaneous three times a day before meals  metoprolol succinate ER 50 milliGRAM(s) Oral daily  pantoprazole    Tablet 40 milliGRAM(s) Oral two times a day  phenytoin   Capsule 300 milliGRAM(s) Oral at bedtime  polyethylene glycol 3350 17 Gram(s) Oral daily  senna 2 Tablet(s) Oral at bedtime    MEDICATIONS  (PRN):  dextrose 40% Gel 15 Gram(s) Oral once PRN Blood Glucose LESS THAN 70 milliGRAM(s)/deciliter  glucagon  Injectable 1 milliGRAM(s) IntraMuscular once PRN Glucose LESS THAN 70 milligrams/deciliter  ondansetron Injectable 4 milliGRAM(s) IV Push every 8 hours PRN Nausea and/or Vomiting  petrolatum white Ointment 1 Application(s) Topical three times a day PRN Nasal dryness    Allergies    No Known Allergies    Intolerances        CONSTITUTIONAL: No acute distress. Awake and alert.  HEAD: No evidence of trauma. Structures WNL.  EYES: +PERRL. +EOMI. No scleral icterus. No conjunctival injection.  ENT: Moist oral mucosa. No erythema. No pharyngeal exudates.   NECK: Supple. Appropriate ROM. No stiffness. No masses or lymphadenopathy.  RESPIRATORY: CTAB. No wheezes, rales, or rhonchi. No accessory muscle use. No apparent respiratory distress.  CARDIOVASCULAR: +S1/S2. No audible S3/S4. Regular rate and rhythm. No murmurs, rubs, or gallops. 2+ radial pulses x b/l UE; 2+ DP pulses x b/l LE.   GASTROINTESTINAL: Soft, nontender, nondistended. +BS. No rebound or guarding.   BACK: No spinal or paraspinal tenderness. No CVA tenderness.  EXTREMITY: No LE swelling or edema. EXTs warm to touch.  MUSCULOSKELETAL: Movement evident in all limbs. No tenderness on palpation.  DERMATOLOGICAL: No abnormal rashes or lesions.  NEUROLOGICAL: CN 2-12 grossly intact. No focal deficits. Sensation intact x 4EXT. A&Ox3 (oriented to person, place, and time).  PSYCHIATRIC: Appropriate affect.                            8.4    9.02  )-----------( 278      ( 20 Oct 2019 08:34 )             28.1       10-20    142  |  103  |  24<H>  ----------------------------<  120<H>  4.6   |  28  |  1.59<H>    Ca    8.0<L>      20 Oct 2019 06:28  Phos  3.8     10-20  Mg     2.2     10-20      CAPILLARY BLOOD GLUCOSE      POCT Blood Glucose.: 249 mg/dL (21 Oct 2019 21:05)  POCT Blood Glucose.: 246 mg/dL (21 Oct 2019 17:38)  POCT Blood Glucose.: 243 mg/dL (21 Oct 2019 14:05)  POCT Blood Glucose.: 171 mg/dL (21 Oct 2019 09:00)                  RADIOLOGY AND ADDITIONAL TESTS:    CONSULTANT NOTES REVIEWED:    CARE DISCUSSED WITH THE FOLLOWING CONSULTANTS/PROVIDERS:

## 2019-10-22 NOTE — PROGRESS NOTE ADULT - PROBLEM SELECTOR PLAN 2
- Septic (positive blood cultures)  - Repeat Bcx negative  - C/w augmentin (day 2) - Septic (positive blood cultures)  - Repeat Bcx negative  - C/w augmentin (day 3); per ID, to continue through 10/28, to follow up with Dr. Logan hogan

## 2019-10-22 NOTE — PROGRESS NOTE ADULT - PROBLEM SELECTOR PLAN 8
- Home dependent Lantus 22U QHS, 10U premeal  - Most recent admission was Lantus 14U QHS, and 6U premeal  - Lantus 12U, c/w Humalog 6 pre-meal  - BS stable - Home dependent Lantus 22U QHS, 10U premeal  - Most recent admission was Lantus 14U QHS, and 6U premeal  - Lantus 12U, c/w Humalog 6 pre-meal

## 2019-10-22 NOTE — PROGRESS NOTE ADULT - PROBLEM SELECTOR PLAN 5
- JACQUELINE in anterior leads, STD in precordial leads, diffuse on repeat EKG --> likely demand ischemia due to anemia  - ASA 81 Plavix 75 mg, Toprol 50  - TTE 2D - Moderate segmental LV systolic dysfunction  - Per primary Cardiologist Dr. Holman - cleared for discharge - JACQUELINE in anterior leads, STD in precordial leads, diffuse on repeat EKG --> likely demand ischemia due to anemia  - ASA 81 Plavix 75 mg, Toprol 50  - TTE 2D - Moderate segmental LV systolic dysfunction  - Per primary Cardiologist Dr. Holman - cleared for discharge; to follow-up outpatient

## 2019-10-22 NOTE — PROGRESS NOTE ADULT - PROBLEM SELECTOR PLAN 10
DVT: HSQ held due to one dark stools  Diet: DASH/TLC/CC  Dispo: Pending  Code: FULL DVT: HSQ held due to one dark stools  Diet: DASH/TLC/CC  Dispo: Pending  Code: DNR

## 2019-10-22 NOTE — PROGRESS NOTE ADULT - ATTENDING COMMENTS
AGree with above management.
Patient seen and examined.  Meds, labs and vitals all reviewed.  Patient with numerous comorbidities, currently with improving sepsis from H. influenza PNA, on Ceftriaxone.   ID input appreciated.  Patient also with cardiac history, NSTEMI. Clinically stable.  Cardiac input appreciated.
Patient seen and examined.  Meds, labs and vitals all reviewed.  Patient with numerous comorbidities, currently with improving sepsis from H. influenza PNA, on Ceftriaxone.   ID input appreciated.  Transitioned to Augmentin today.  Patient also with cardiac history, NSTEMI. Clinically stable.  Cardiac input appreciated.   Markedly improved.  Awaiting discharge.
Patient seen and examined today. I agree with Dr. Carias's findings, assessment, and plan with the following additions and exceptions:     Sepsis secondary to H. Influenza bacteremia secondary to CAP. No signs or symptoms of meningitis. Low suspicion.   Repeat Bcx with NGTD.  CXR unchanged after lasix 20 mg ivp. Give lasix 40 mg ivp. Repeat CXR in AM.   Monitor Hgb and monitor RFTs.   Monitor cbc while on DAPT for medical management of NSTEMI.   No more nose bleeding.   GI, Cards and ID input appreciated.   Rest of plan as above    Dr. Domingo Heller, DO  Division of Hospital Medicine  [facility]  Pager:  [phone] .
D/c to rehab today. D/c time 40 mins.
Rehab placement per family request.
Patient seen and examined today. I agree with Dr. Carias's findings, assessment, and plan with the following additions and exceptions:     Sepsis secondary to H. Influenza bacteremia secondary to CAP. No signs or symptoms of meningitis. Low suspicion.   Repeat Bcx with NGTD.  AM CXR with slight congestion with pleural effusions. Will give IV lasix. Goal net negative 1L overnight.   CTX changed to be in NS for more tonicity which will reduce 3rd spacing.   Monitor cbc while on DAPT for medical management of NSTEMI.   Vaseline for dry nose. Patient advised to stop picking at his nose.   GI, Cards and ID input appreciated.   Rest of plan as above    Dr. Domingo Heller, DO  Division of Hospital Medicine  [facility]  Pager:  [phone] .
Patient seen and examined today. I agree with Dr. Carias's findings, assessment, and plan with the following additions and exceptions:     Sepsis secondary to CAP. Had cough prior to admission.   CTX / Azithromycin iv. Check RVP and Urine legionella. Will adjust abx based on data and clinical course.   Hold lasix and check FeUREA.   Will discuss UDAY with cardiology. May benefit from IVF. F/u urine studies.  Rest of plan as above    Dr. Domingo Heller,   Division of Hospital Medicine  Health system  Pager:  [phone]
Patient seen and examined today. I agree with Dr. Carias's findings, assessment, and plan with the following additions and exceptions:     Sepsis secondary to H. Influenza bacteremia secondary to CAP. No signs or symptoms of meningitis. Low suspicion.   Repeat Bcx.   Continue to hold diuretics. Repeat CXR in AM to make sure not getting overloaded.   Monitor cbc while on DAPT for NSTEMI and PVD.   GI, Cards and ID input appreciated.   Rest of plan as above    Dr. Domingo Heller, DO  Division of Hospital Medicine  [facility]  Pager:  [phone] .
Patient seen and examined today. I agree with Dr. Carias's findings, assessment, and plan with the following additions and exceptions:     Sepsis secondary to H. Influenza bacteremia secondary to CAP. No signs or symptoms of meningitis. Low suspicion.   Repeat Bcx with NGTD.  CXR with worsening right pleural effusion.  D/C cozaar. Give lasix 80 mg ivp x1. Goal net negative 1L at least.   Monitor hemodynamics closely.   Monitor renal function. Cr elevation partially from ARB but could be component of cardiorenal process.   Monitor cbc while on DAPT for medical management of NSTEMI. If Hgb stable tomorrow, restart HSQ.   GI, Cards and ID input appreciated.   Rest of plan as above    Dr. Domingo Heller DO  Division of Hospital Medicine  [facility]  Pager:  [phone] .

## 2019-10-22 NOTE — DISCHARGE NOTE NURSING/CASE MANAGEMENT/SOCIAL WORK - PATIENT PORTAL LINK FT
You can access the FollowMyHealth Patient Portal offered by [facility] by registering at the following website: [email or web address]. By joining [facility]’s FollowMyHealth portal, you will also be able to view your health information using other applications (apps) compatible with our system.

## 2019-10-22 NOTE — PROGRESS NOTE ADULT - REASON FOR ADMISSION
NSTEMI

## 2019-10-22 NOTE — PROGRESS NOTE ADULT - PROBLEM SELECTOR PLAN 3
- Due to haemophilus influenzae  - Management as above  - ID following - Due to haemophilus influenzae  - Management as above

## 2019-10-22 NOTE — PROGRESS NOTE ADULT - PROVIDER SPECIALTY LIST ADULT
Cardiology
Infectious Disease
Internal Medicine
Cardiology
Gastroenterology
Internal Medicine

## 2019-10-22 NOTE — PROGRESS NOTE ADULT - PROBLEM SELECTOR PLAN 7
- CAD s/p stents, remote hx of MI  - Presents with NSTEMI, unlikely related to stent thrombosis  - c/w ASA, Plavix, atorvastatin, Toprol

## 2019-10-22 NOTE — PROGRESS NOTE ADULT - PROBLEM SELECTOR PLAN 6
- Recently admitted for workup of melena concerning for UGIB; c/w ASA, plavix, no intervention.  - C/o darks tools, received s/p 1U PRBC in ED  - Hgb stable  - Protonix PO 40mg BID

## 2019-10-22 NOTE — PROGRESS NOTE ADULT - PROBLEM SELECTOR PLAN 4
- Initially prerenal due to sepsis and lasix, now ATN; FeUrea 48.2% --> intrarenal   - Cr stable; patient reports good urine  - Trend BMP

## 2019-10-22 NOTE — DISCHARGE NOTE NURSING/CASE MANAGEMENT/SOCIAL WORK - NSDCFUADDAPPT_GEN_ALL_CORE_FT
You are advised to schedule appointments with the following specialists:    Steven Holman)  Cardiovascular Disease; Internal Medicine; Interventional Cardiology  [address] [place] [address]  Phone: [phone]  Fax: [phone]  Follow Up Time: 1 week    Gilma Jacobs)  Internal Medicine  [address], [address]  [place] [address]  Phone: [phone]  Fax: [phone]  Follow Up Time: 1 week

## 2019-11-12 PROCEDURE — 71045 X-RAY EXAM CHEST 1 VIEW: CPT

## 2019-11-12 PROCEDURE — 86850 RBC ANTIBODY SCREEN: CPT

## 2019-11-12 PROCEDURE — 82570 ASSAY OF URINE CREATININE: CPT

## 2019-11-12 PROCEDURE — 84540 ASSAY OF URINE/UREA-N: CPT

## 2019-11-12 PROCEDURE — 97110 THERAPEUTIC EXERCISES: CPT

## 2019-11-12 PROCEDURE — 87150 DNA/RNA AMPLIFIED PROBE: CPT

## 2019-11-12 PROCEDURE — 85045 AUTOMATED RETICULOCYTE COUNT: CPT

## 2019-11-12 PROCEDURE — 87449 NOS EACH ORGANISM AG IA: CPT

## 2019-11-12 PROCEDURE — 99285 EMERGENCY DEPT VISIT HI MDM: CPT | Mod: 25

## 2019-11-12 PROCEDURE — 36430 TRANSFUSION BLD/BLD COMPNT: CPT

## 2019-11-12 PROCEDURE — 80048 BASIC METABOLIC PNL TOTAL CA: CPT

## 2019-11-12 PROCEDURE — 85610 PROTHROMBIN TIME: CPT

## 2019-11-12 PROCEDURE — 82962 GLUCOSE BLOOD TEST: CPT

## 2019-11-12 PROCEDURE — 84484 ASSAY OF TROPONIN QUANT: CPT

## 2019-11-12 PROCEDURE — 83735 ASSAY OF MAGNESIUM: CPT

## 2019-11-12 PROCEDURE — 86901 BLOOD TYPING SEROLOGIC RH(D): CPT

## 2019-11-12 PROCEDURE — 82553 CREATINE MB FRACTION: CPT

## 2019-11-12 PROCEDURE — 93005 ELECTROCARDIOGRAM TRACING: CPT

## 2019-11-12 PROCEDURE — 87184 SC STD DISK METHOD PER PLATE: CPT

## 2019-11-12 PROCEDURE — 83880 ASSAY OF NATRIURETIC PEPTIDE: CPT

## 2019-11-12 PROCEDURE — 87486 CHLMYD PNEUM DNA AMP PROBE: CPT

## 2019-11-12 PROCEDURE — 84100 ASSAY OF PHOSPHORUS: CPT

## 2019-11-12 PROCEDURE — 97162 PT EVAL MOD COMPLEX 30 MIN: CPT

## 2019-11-12 PROCEDURE — 81001 URINALYSIS AUTO W/SCOPE: CPT

## 2019-11-12 PROCEDURE — 87798 DETECT AGENT NOS DNA AMP: CPT

## 2019-11-12 PROCEDURE — 83540 ASSAY OF IRON: CPT

## 2019-11-12 PROCEDURE — 87086 URINE CULTURE/COLONY COUNT: CPT

## 2019-11-12 PROCEDURE — 87633 RESP VIRUS 12-25 TARGETS: CPT

## 2019-11-12 PROCEDURE — 97116 GAIT TRAINING THERAPY: CPT

## 2019-11-12 PROCEDURE — 82607 VITAMIN B-12: CPT

## 2019-11-12 PROCEDURE — 86900 BLOOD TYPING SEROLOGIC ABO: CPT

## 2019-11-12 PROCEDURE — 83550 IRON BINDING TEST: CPT

## 2019-11-12 PROCEDURE — 84300 ASSAY OF URINE SODIUM: CPT

## 2019-11-12 PROCEDURE — 85027 COMPLETE CBC AUTOMATED: CPT

## 2019-11-12 PROCEDURE — 87040 BLOOD CULTURE FOR BACTERIA: CPT

## 2019-11-12 PROCEDURE — 36415 COLL VENOUS BLD VENIPUNCTURE: CPT

## 2019-11-12 PROCEDURE — 86923 COMPATIBILITY TEST ELECTRIC: CPT

## 2019-11-12 PROCEDURE — C8929: CPT

## 2019-11-12 PROCEDURE — 97530 THERAPEUTIC ACTIVITIES: CPT

## 2019-11-12 PROCEDURE — 92610 EVALUATE SWALLOWING FUNCTION: CPT

## 2019-11-12 PROCEDURE — 87581 M.PNEUMON DNA AMP PROBE: CPT

## 2019-11-12 PROCEDURE — 85730 THROMBOPLASTIN TIME PARTIAL: CPT

## 2019-11-12 PROCEDURE — 82746 ASSAY OF FOLIC ACID SERUM: CPT

## 2019-11-12 PROCEDURE — P9016: CPT

## 2019-11-12 PROCEDURE — 80053 COMPREHEN METABOLIC PANEL: CPT

## 2019-11-14 ENCOUNTER — LABORATORY RESULT (OUTPATIENT)
Age: 84
End: 2019-11-14

## 2019-11-14 ENCOUNTER — APPOINTMENT (OUTPATIENT)
Dept: INFECTIOUS DISEASE | Facility: CLINIC | Age: 84
End: 2019-11-14
Payer: MEDICAID

## 2019-11-14 VITALS
OXYGEN SATURATION: 91 % | DIASTOLIC BLOOD PRESSURE: 59 MMHG | TEMPERATURE: 96.8 F | BODY MASS INDEX: 23.22 KG/M2 | HEART RATE: 82 BPM | WEIGHT: 164 LBS | RESPIRATION RATE: 18 BRPM | HEIGHT: 70.5 IN | SYSTOLIC BLOOD PRESSURE: 108 MMHG

## 2019-11-14 DIAGNOSIS — A49.2 HEMOPHILUS INFLUENZAE INFECTION, UNSPECIFIED SITE: ICD-10-CM

## 2019-11-14 DIAGNOSIS — R78.81 BACTEREMIA: ICD-10-CM

## 2019-11-14 PROCEDURE — 99213 OFFICE O/P EST LOW 20 MIN: CPT

## 2019-11-14 NOTE — PHYSICAL EXAM
[General Appearance - Alert] : alert [General Appearance - In No Acute Distress] : in no acute distress [General Appearance - Well Nourished] : well nourished [General Appearance - Well-Appearing] : healthy appearing [] : no respiratory distress [Respiration, Rhythm And Depth] : normal respiratory rhythm and effort [Exaggerated Use Of Accessory Muscles For Inspiration] : no accessory muscle use [Auscultation Breath Sounds / Voice Sounds] : lungs were clear to auscultation bilaterally [Heart Rate And Rhythm] : heart rate was normal and rhythm regular [Murmurs] : no murmurs [Heart Sounds] : normal S1 and S2 [Bowel Sounds] : normal bowel sounds [Abdomen Soft] : soft [Abdomen Tenderness] : non-tender [Abdomen Mass (___ Cm)] : no abdominal mass palpated [No Palpable Adenopathy] : no palpable adenopathy [FreeTextEntry1] : Mild LE edema [Motor Exam] : the motor exam was normal [Sensation] : the sensory exam was normal to light touch and pinprick [Oriented To Time, Place, And Person] : oriented to person, place, and time [Affect] : the affect was normal

## 2019-11-14 NOTE — HISTORY OF PRESENT ILLNESS
[FreeTextEntry1] : 89 yo M PMHx CAD s/p stents, CEA, PVD s/p stents, dementia, GI bleed presents seen at [facility] where he had an episode of H influenzae bacteremia, thought 2/2 URI. Patient given course of IV CTX into PO Augmentin. Doing well, completed antibiotic course. No fevers, no chills, no new complaints. Unchanged. Presents for follow up.\par \par [facility]

## 2019-11-14 NOTE — ASSESSMENT
[FreeTextEntry1] : 89 yo M PMHx CAD s/p stents, CEA, PVD s/p stents, dementia, GI bleed with episode H influenzae bacteremia at [facility].\par Well appearing, no fevers, no chills\par S/p course antibiotics\par Check surveillance BCXs--rule out persistent/occult bacteremia\par Cardiology following patient for LE edema\par Overall, Bacteremia, H influenzae\par - Continue off abx\par - Check BCX x 2\par - At Cardiologist request--check CBC, CMP BNP\par - RTC PRN\par  [Disclosure of Diagnosis] : disclosure of diagnosis

## 2019-11-15 ENCOUNTER — OTHER (OUTPATIENT)
Age: 84
End: 2019-11-15

## 2019-11-15 LAB
ALBUMIN SERPL ELPH-MCNC: 3.7 G/DL
ALP BLD-CCNC: 152 U/L
ALT SERPL-CCNC: 39 U/L
ANION GAP SERPL CALC-SCNC: 15 MMOL/L
AST SERPL-CCNC: 40 U/L
BASOPHILS # BLD AUTO: 0.05 K/UL
BASOPHILS NFR BLD AUTO: 0.7 %
BILIRUB SERPL-MCNC: <0.2 MG/DL
BUN SERPL-MCNC: 36 MG/DL
CALCIUM SERPL-MCNC: 8.7 MG/DL
CHLORIDE SERPL-SCNC: 106 MMOL/L
CO2 SERPL-SCNC: 20 MMOL/L
CREAT SERPL-MCNC: 1.47 MG/DL
EOSINOPHIL # BLD AUTO: 0.21 K/UL
EOSINOPHIL NFR BLD AUTO: 2.8 %
GLUCOSE SERPL-MCNC: 103 MG/DL
HCT VFR BLD CALC: 31.5 %
HGB BLD-MCNC: 8.9 G/DL
IMM GRANULOCYTES NFR BLD AUTO: 0.1 %
LYMPHOCYTES # BLD AUTO: 1.26 K/UL
LYMPHOCYTES NFR BLD AUTO: 17.1 %
MAN DIFF?: NORMAL
MCHC RBC-ENTMCNC: 28.3 GM/DL
MCHC RBC-ENTMCNC: 31 PG
MCV RBC AUTO: 109.8 FL
MONOCYTES # BLD AUTO: 0.76 K/UL
MONOCYTES NFR BLD AUTO: 10.3 %
NEUTROPHILS # BLD AUTO: 5.1 K/UL
NEUTROPHILS NFR BLD AUTO: 69 %
NT-PROBNP SERPL-MCNC: ABNORMAL PG/ML
PLATELET # BLD AUTO: 275 K/UL
POTASSIUM SERPL-SCNC: 5.4 MMOL/L
PROT SERPL-MCNC: 6.5 G/DL
RBC # BLD: 2.87 M/UL
RBC # FLD: 16.6 %
SODIUM SERPL-SCNC: 141 MMOL/L
WBC # FLD AUTO: 7.39 K/UL

## 2019-11-20 LAB
BACTERIA BLD CULT: NORMAL
BACTERIA BLD CULT: NORMAL

## 2019-11-21 LAB — CULTURE RESULTS: SIGNIFICANT CHANGE UP

## 2019-12-08 ENCOUNTER — INPATIENT (INPATIENT)
Facility: HOSPITAL | Age: 84
LOS: 3 days | End: 2019-12-12
Attending: GENERAL PRACTICE | Admitting: GENERAL PRACTICE
Payer: MEDICARE

## 2019-12-08 VITALS
SYSTOLIC BLOOD PRESSURE: 140 MMHG | OXYGEN SATURATION: 97 % | HEART RATE: 78 BPM | RESPIRATION RATE: 22 BRPM | DIASTOLIC BLOOD PRESSURE: 88 MMHG

## 2019-12-08 DIAGNOSIS — Z95.820 PERIPHERAL VASCULAR ANGIOPLASTY STATUS WITH IMPLANTS AND GRAFTS: Chronic | ICD-10-CM

## 2019-12-08 DIAGNOSIS — Z98.890 OTHER SPECIFIED POSTPROCEDURAL STATES: Chronic | ICD-10-CM

## 2019-12-08 DIAGNOSIS — E11.9 TYPE 2 DIABETES MELLITUS WITHOUT COMPLICATIONS: ICD-10-CM

## 2019-12-08 DIAGNOSIS — R06.89 OTHER ABNORMALITIES OF BREATHING: ICD-10-CM

## 2019-12-08 DIAGNOSIS — I50.23 ACUTE ON CHRONIC SYSTOLIC (CONGESTIVE) HEART FAILURE: ICD-10-CM

## 2019-12-08 DIAGNOSIS — R56.9 UNSPECIFIED CONVULSIONS: ICD-10-CM

## 2019-12-08 DIAGNOSIS — N17.9 ACUTE KIDNEY FAILURE, UNSPECIFIED: ICD-10-CM

## 2019-12-08 DIAGNOSIS — Z29.9 ENCOUNTER FOR PROPHYLACTIC MEASURES, UNSPECIFIED: ICD-10-CM

## 2019-12-08 DIAGNOSIS — I10 ESSENTIAL (PRIMARY) HYPERTENSION: ICD-10-CM

## 2019-12-08 DIAGNOSIS — R07.9 CHEST PAIN, UNSPECIFIED: ICD-10-CM

## 2019-12-08 DIAGNOSIS — I50.9 HEART FAILURE, UNSPECIFIED: ICD-10-CM

## 2019-12-08 DIAGNOSIS — D64.9 ANEMIA, UNSPECIFIED: ICD-10-CM

## 2019-12-08 DIAGNOSIS — Z66 DO NOT RESUSCITATE: ICD-10-CM

## 2019-12-08 DIAGNOSIS — I25.10 ATHEROSCLEROTIC HEART DISEASE OF NATIVE CORONARY ARTERY WITHOUT ANGINA PECTORIS: ICD-10-CM

## 2019-12-08 LAB
ALBUMIN SERPL ELPH-MCNC: 3.4 G/DL — SIGNIFICANT CHANGE UP (ref 3.3–5)
ALP SERPL-CCNC: 144 U/L — HIGH (ref 40–120)
ALT FLD-CCNC: 34 U/L — SIGNIFICANT CHANGE UP (ref 4–41)
ANION GAP SERPL CALC-SCNC: 12 MMO/L — SIGNIFICANT CHANGE UP (ref 7–14)
APTT BLD: 26.1 SEC — LOW (ref 27.5–36.3)
AST SERPL-CCNC: 39 U/L — SIGNIFICANT CHANGE UP (ref 4–40)
BASE EXCESS BLDV CALC-SCNC: 3.5 MMOL/L — SIGNIFICANT CHANGE UP
BILIRUB SERPL-MCNC: 0.4 MG/DL — SIGNIFICANT CHANGE UP (ref 0.2–1.2)
BLOOD GAS VENOUS - CREATININE: 1.81 MG/DL — HIGH (ref 0.5–1.3)
BUN SERPL-MCNC: 53 MG/DL — HIGH (ref 7–23)
CALCIUM SERPL-MCNC: 8.8 MG/DL — SIGNIFICANT CHANGE UP (ref 8.4–10.5)
CHLORIDE BLDV-SCNC: 106 MMOL/L — SIGNIFICANT CHANGE UP (ref 96–108)
CHLORIDE SERPL-SCNC: 102 MMOL/L — SIGNIFICANT CHANGE UP (ref 98–107)
CO2 SERPL-SCNC: 27 MMOL/L — SIGNIFICANT CHANGE UP (ref 22–31)
CREAT SERPL-MCNC: 1.82 MG/DL — HIGH (ref 0.5–1.3)
GAS PNL BLDV: 138 MMOL/L — SIGNIFICANT CHANGE UP (ref 136–146)
GLUCOSE BLDC GLUCOMTR-MCNC: 153 MG/DL — HIGH (ref 70–99)
GLUCOSE BLDV-MCNC: 152 MG/DL — HIGH (ref 70–99)
GLUCOSE SERPL-MCNC: 167 MG/DL — HIGH (ref 70–99)
HCO3 BLDV-SCNC: 26 MMOL/L — SIGNIFICANT CHANGE UP (ref 20–27)
HCT VFR BLD CALC: 33.3 % — LOW (ref 39–50)
HCT VFR BLDV CALC: 30.1 % — LOW (ref 39–51)
HGB BLD-MCNC: 9.9 G/DL — LOW (ref 13–17)
HGB BLDV-MCNC: 9.7 G/DL — LOW (ref 13–17)
INR BLD: 1.28 — HIGH (ref 0.88–1.17)
LACTATE BLDV-MCNC: 1.7 MMOL/L — SIGNIFICANT CHANGE UP (ref 0.5–2)
MCHC RBC-ENTMCNC: 28.9 PG — SIGNIFICANT CHANGE UP (ref 27–34)
MCHC RBC-ENTMCNC: 29.7 % — LOW (ref 32–36)
MCV RBC AUTO: 97.4 FL — SIGNIFICANT CHANGE UP (ref 80–100)
NRBC # FLD: 0.03 K/UL — SIGNIFICANT CHANGE UP (ref 0–0)
NT-PROBNP SERPL-SCNC: SIGNIFICANT CHANGE UP PG/ML
PCO2 BLDV: 57 MMHG — HIGH (ref 41–51)
PH BLDV: 7.33 PH — SIGNIFICANT CHANGE UP (ref 7.32–7.43)
PLATELET # BLD AUTO: 215 K/UL — SIGNIFICANT CHANGE UP (ref 150–400)
PMV BLD: 11.8 FL — SIGNIFICANT CHANGE UP (ref 7–13)
PO2 BLDV: 24 MMHG — LOW (ref 35–40)
POTASSIUM BLDV-SCNC: 4.7 MMOL/L — HIGH (ref 3.4–4.5)
POTASSIUM SERPL-MCNC: 5.2 MMOL/L — SIGNIFICANT CHANGE UP (ref 3.5–5.3)
POTASSIUM SERPL-SCNC: 5.2 MMOL/L — SIGNIFICANT CHANGE UP (ref 3.5–5.3)
PROT SERPL-MCNC: 6.9 G/DL — SIGNIFICANT CHANGE UP (ref 6–8.3)
PROTHROM AB SERPL-ACNC: 14.3 SEC — HIGH (ref 9.8–13.1)
RBC # BLD: 3.42 M/UL — LOW (ref 4.2–5.8)
RBC # FLD: 17.1 % — HIGH (ref 10.3–14.5)
SAO2 % BLDV: 24.4 % — LOW (ref 60–85)
SODIUM SERPL-SCNC: 141 MMOL/L — SIGNIFICANT CHANGE UP (ref 135–145)
TROPONIN T, HIGH SENSITIVITY: 47 NG/L — SIGNIFICANT CHANGE UP (ref ?–14)
TROPONIN T, HIGH SENSITIVITY: 52 NG/L — HIGH (ref ?–14)
WBC # BLD: 10.13 K/UL — SIGNIFICANT CHANGE UP (ref 3.8–10.5)
WBC # FLD AUTO: 10.13 K/UL — SIGNIFICANT CHANGE UP (ref 3.8–10.5)

## 2019-12-08 PROCEDURE — 71250 CT THORAX DX C-: CPT | Mod: 26

## 2019-12-08 PROCEDURE — 71045 X-RAY EXAM CHEST 1 VIEW: CPT | Mod: 26

## 2019-12-08 RX ORDER — DONEPEZIL HYDROCHLORIDE 10 MG/1
10 TABLET, FILM COATED ORAL AT BEDTIME
Refills: 0 | Status: DISCONTINUED | OUTPATIENT
Start: 2019-12-08 | End: 2019-12-12

## 2019-12-08 RX ORDER — DEXTROSE 50 % IN WATER 50 %
25 SYRINGE (ML) INTRAVENOUS ONCE
Refills: 0 | Status: DISCONTINUED | OUTPATIENT
Start: 2019-12-08 | End: 2019-12-12

## 2019-12-08 RX ORDER — INSULIN LISPRO 100/ML
VIAL (ML) SUBCUTANEOUS
Refills: 0 | Status: DISCONTINUED | OUTPATIENT
Start: 2019-12-08 | End: 2019-12-12

## 2019-12-08 RX ORDER — DULOXETINE HYDROCHLORIDE 30 MG/1
30 CAPSULE, DELAYED RELEASE ORAL EVERY 12 HOURS
Refills: 0 | Status: DISCONTINUED | OUTPATIENT
Start: 2019-12-08 | End: 2019-12-12

## 2019-12-08 RX ORDER — PANTOPRAZOLE SODIUM 20 MG/1
40 TABLET, DELAYED RELEASE ORAL
Refills: 0 | Status: DISCONTINUED | OUTPATIENT
Start: 2019-12-08 | End: 2019-12-12

## 2019-12-08 RX ORDER — INSULIN GLARGINE 100 [IU]/ML
18 INJECTION, SOLUTION SUBCUTANEOUS AT BEDTIME
Refills: 0 | Status: DISCONTINUED | OUTPATIENT
Start: 2019-12-08 | End: 2019-12-12

## 2019-12-08 RX ORDER — INSULIN LISPRO 100/ML
7 VIAL (ML) SUBCUTANEOUS
Refills: 0 | Status: DISCONTINUED | OUTPATIENT
Start: 2019-12-08 | End: 2019-12-12

## 2019-12-08 RX ORDER — ROSUVASTATIN CALCIUM 5 MG/1
1 TABLET ORAL
Qty: 0 | Refills: 0 | DISCHARGE

## 2019-12-08 RX ORDER — DEXTROSE 50 % IN WATER 50 %
15 SYRINGE (ML) INTRAVENOUS ONCE
Refills: 0 | Status: DISCONTINUED | OUTPATIENT
Start: 2019-12-08 | End: 2019-12-12

## 2019-12-08 RX ORDER — INSULIN LISPRO 100/ML
VIAL (ML) SUBCUTANEOUS AT BEDTIME
Refills: 0 | Status: DISCONTINUED | OUTPATIENT
Start: 2019-12-08 | End: 2019-12-12

## 2019-12-08 RX ORDER — ASPIRIN/CALCIUM CARB/MAGNESIUM 324 MG
81 TABLET ORAL DAILY
Refills: 0 | Status: DISCONTINUED | OUTPATIENT
Start: 2019-12-08 | End: 2019-12-12

## 2019-12-08 RX ORDER — FUROSEMIDE 40 MG
1.5 TABLET ORAL
Qty: 0 | Refills: 0 | DISCHARGE

## 2019-12-08 RX ORDER — FUROSEMIDE 40 MG
40 TABLET ORAL DAILY
Refills: 0 | Status: DISCONTINUED | OUTPATIENT
Start: 2019-12-08 | End: 2019-12-11

## 2019-12-08 RX ORDER — CHOLECALCIFEROL (VITAMIN D3) 125 MCG
1000 CAPSULE ORAL DAILY
Refills: 0 | Status: DISCONTINUED | OUTPATIENT
Start: 2019-12-08 | End: 2019-12-12

## 2019-12-08 RX ORDER — ATORVASTATIN CALCIUM 80 MG/1
40 TABLET, FILM COATED ORAL AT BEDTIME
Refills: 0 | Status: DISCONTINUED | OUTPATIENT
Start: 2019-12-08 | End: 2019-12-12

## 2019-12-08 RX ORDER — CLOPIDOGREL BISULFATE 75 MG/1
75 TABLET, FILM COATED ORAL DAILY
Refills: 0 | Status: DISCONTINUED | OUTPATIENT
Start: 2019-12-08 | End: 2019-12-12

## 2019-12-08 RX ORDER — SODIUM CHLORIDE 9 MG/ML
1000 INJECTION, SOLUTION INTRAVENOUS
Refills: 0 | Status: DISCONTINUED | OUTPATIENT
Start: 2019-12-08 | End: 2019-12-12

## 2019-12-08 RX ORDER — DEXTROSE 50 % IN WATER 50 %
12.5 SYRINGE (ML) INTRAVENOUS ONCE
Refills: 0 | Status: DISCONTINUED | OUTPATIENT
Start: 2019-12-08 | End: 2019-12-12

## 2019-12-08 RX ORDER — GLUCAGON INJECTION, SOLUTION 0.5 MG/.1ML
1 INJECTION, SOLUTION SUBCUTANEOUS ONCE
Refills: 0 | Status: DISCONTINUED | OUTPATIENT
Start: 2019-12-08 | End: 2019-12-12

## 2019-12-08 RX ORDER — POLYETHYLENE GLYCOL 3350 17 G/17G
17 POWDER, FOR SOLUTION ORAL DAILY
Refills: 0 | Status: DISCONTINUED | OUTPATIENT
Start: 2019-12-08 | End: 2019-12-12

## 2019-12-08 RX ORDER — METOPROLOL TARTRATE 50 MG
50 TABLET ORAL DAILY
Refills: 0 | Status: DISCONTINUED | OUTPATIENT
Start: 2019-12-09 | End: 2019-12-12

## 2019-12-08 RX ADMIN — ATORVASTATIN CALCIUM 40 MILLIGRAM(S): 80 TABLET, FILM COATED ORAL at 22:26

## 2019-12-08 RX ADMIN — Medication 40 MILLIGRAM(S): at 22:26

## 2019-12-08 RX ADMIN — Medication 300 MILLIGRAM(S): at 22:26

## 2019-12-08 RX ADMIN — DONEPEZIL HYDROCHLORIDE 10 MILLIGRAM(S): 10 TABLET, FILM COATED ORAL at 22:26

## 2019-12-08 NOTE — H&P ADULT - PROBLEM SELECTOR PLAN 1
Admit to tele  check cbc, bmp, a1c, flp, tsh, trend CE  CT chest prelim noted to have: moderate right and small left pleural effusion. Near-complete atelectasis of the bilateral lower lobes. Follow official report  Restart lasix as 40mg IV daily  Incentive spirometry  Dr. Holman to follow for cardiology  strict I&Os  Fluid restriction  LE dopplers ordered  Discussed with Dr. Jiménez

## 2019-12-08 NOTE — ED PROVIDER NOTE - NS ED ROS FT
Please see HPI section of chart for further detailed Review of Systems    chest pain Please see HPI section of chart for further detailed Review of Systems    chest pain  cough Please see HPI section of chart for further detailed Review of Systems    chest pain, cough

## 2019-12-08 NOTE — ED PROVIDER NOTE - PMH
CAD (Coronary Artery Disease)    DM (Diabetes Mellitus)    HTN (Hypertension)    HTN (Hypertension)    OM (osteomyelitis)    PVD (peripheral vascular disease)    Seizure

## 2019-12-08 NOTE — H&P ADULT - HISTORY OF PRESENT ILLNESS
89 y/o M with hx of CAD s/p stents, CEA, PVD (s/p stent), DM, HTN, recent GI bleed and NSTEMI in Oct 2019, mild dementia [A&Ox4]  presents with chest pain x 1 day. Patent states the chest pain is midsternal, non radiating worse when with deep inspiration. Endorses a cough. His lasix was increased to 60mg by Dr. Holman about 1-2 weeks ago. He also had LE edema which improved after taking the increased dose of Lasix He then noted to have worsening generalized weakness. He was ambulating with a walker prior and the son noted patient could not ambulate well and was using a rollator to get around. Patient does not complain of SOB and since he was not ambulating well unable to note SERRATO. Denies fever, chills, cough, falls, LOC, abdominal pain, nausea, vomiting, melena, hematochezia, diarrhea, constipation or dysuria.

## 2019-12-08 NOTE — ED ADULT NURSE NOTE - NSIMPLEMENTINTERV_GEN_ALL_ED
Implemented All Fall with Harm Risk Interventions:  [place] to call system. Call bell, personal items and telephone within reach. Instruct patient to call for assistance. Room bathroom lighting operational. Non-slip footwear when patient is off stretcher. Physically safe environment: no spills, clutter or unnecessary equipment. Stretcher in lowest position, wheels locked, appropriate side rails in place. Provide visual cue, wrist band, yellow gown, etc. Monitor gait and stability. Monitor for mental status changes and reorient to person, place, and time. Review medications for side effects contributing to fall risk. Reinforce activity limits and safety measures with patient and family. Provide visual clues: red socks.

## 2019-12-08 NOTE — H&P ADULT - NSICDXPASTSURGICALHX_GEN_ALL_CORE_FT
PAST SURGICAL HISTORY:  History of total hip arthroplasty     S/P carotid endarterectomy     S/P hernia surgery     S/P peripheral artery angioplasty with stent placement left superficial femoral artery

## 2019-12-08 NOTE — ED PROVIDER NOTE - ST/T WAVE
no st elevation, no st depression, abnormal morphology of st segment in V2 with twi, twi in V3-V6 no st elevation, no st depression, twi in V2-V6

## 2019-12-08 NOTE — ED PROVIDER NOTE - PHYSICAL EXAMINATION
General: awake, alert, oriented, no acute distress. Resting in bed.  HEENT: PERRLA EOMI. No trauma/bruising noted to head or face.   CV: Regular rate and rhythm, S1/S2, no murmurs/rubs/gallops noted on exam. No tenderness to palpation to chest wall.   Lungs: Decreased breath sounds to RLL. no wheezes/crackles/rales noted on exam. Equal chest wall excursion noted.   Abdomen: Soft, non tender, non distended, no guarding or rebound. No CVA tenderness to palpation.   MSK: Intact ROM of upper and lower extremities bilaterally. No tenderness to palpation to extremities. Intact ROM of neck. No gross deformities noted to extremities.   Neuro: Awake, A+O x4, moving all extremities spontaneously. CN 2-12 grossly intact. No nystagmus noted. Strength and sensation grossly intact to all extremities. Speech fluent, no slurred speech.   Extremities: No swelling or edema noted to extremities. No calf tenderness to palpation.   Skin: No rash or bruising noted on exam.

## 2019-12-08 NOTE — H&P ADULT - ASSESSMENT
89 y/o M with hx of CAD s/p stents, CEA, PVD (s/p stent), DM, HTN, recent GI bleed and NSTEMI in Oct 2019, mild dementia [A&Ox4]  presents with chest pain x 1 day. admitted for acute on chronic CHF

## 2019-12-08 NOTE — ED ADULT NURSE NOTE - OBJECTIVE STATEMENT
Pt is a 89yr old male, A&Ox4 and ambulatory with a walker, complaining of generalized chest pain, SOB, and pain on inspiration since last night. Pt has a history of 9 stents placed in the past and an MI. As per family, pt was on Lasix and was told to stop taking it for due to becoming "delirious", however family says he is "back to normal". Pt breathing even and unlabored, oxygen saturation at 97% on 2L nasal cannula. Pt not on home O2. NSR on the monitor. VS as noted. Denies chest pain, SOB, palpitations, headache, dizziness, abd. pain, dysuria, and blood in the stool or urine at this time. 20 gauge IV in the left forearm. 20 gauge IV placed by EMS on the right hand. Labs sent. Will continue to monitor.

## 2019-12-08 NOTE — H&P ADULT - NSHPLABSRESULTS_GEN_ALL_CORE
EKG: NSR 75 Flat T in V1, V5, V6, I, AvR TWI in V2-V4                          9.9    10.13 )-----------( 215      ( 08 Dec 2019 14:40 )             33.3    12-08    141  |  102  |  53<H>  ----------------------------<  167<H>  5.2   |  27  |  1.82<H>    Ca    8.8      08 Dec 2019 14:40    TPro  6.9  /  Alb  3.4  /  TBili  0.4  /  DBili  x   /  AST  39  /  ALT  34  /  AlkPhos  144<H>  12-08    Troponin T, High Sensitivity: 47: ---------------------***PLEASE NOTE***----------------------  Rapid changes upward or downward in high-sensitivity  troponin levels strongly suggest acute myocardial injury.  Hemolysis may falsely lower results. Renal impairment may  increase results.    Normal: <6 - 14 ng/L  Indeterminate: 15 - 51 ng/L  Elevated: >51 ng/L    Please see "[email or web address]" on the [place] for more information. ng/L (12.08.19 @ 16:40)    Troponin T, High Sensitivity: 52: RESULT CALLED TO: BENITO FLETCHER  DATE / TIME CALLED: 12/08/19 1537  CALLED BY: TAMMY DONOVAN  ---------------------***PLEASE NOTE***----------------------  Rapid changes upward or downward in high-sensitivity  troponin levels strongly suggest acute myocardial injury.  Hemolysis may falsely lower results. Renal impairment may  increase results.    Normal: <6 - 14 ng/L  Indeterminate: 15 - 51 ng/L  Elevated: >51 ng/L    Please see "[email or web address] on the [place] for more information. ng/L (12.08.19 @ 14:40)    < from: CT Chest No Cont (12.08.19 @ 17:11) >    ******PRELIMINARY REPORT******            INTERPRETATION:  Moderate right and small left pleural effusions.     Near-complete atelectasis of the bilateral lower lobes.     Emphysema.    Cholelithiasis.            ******PRELIMINARY REPORT******      < end of copied text >

## 2019-12-08 NOTE — ED PROVIDER NOTE - OBJECTIVE STATEMENT
89M w pmx of CAD s/p 9 stents, CEA, DM, HTN, PVD s/p stents, GI bleed presents today w chief complaint of chest pain. Patient reports waking up this AM with chest pain, pressure-like, across his upper chest. Chest pain did not radiate to neck, back, arms.  Pain was worse with deep inspiration. Patient states he feels like he needs to take deep breathes to breath but does not feel short of breath overall. Denies active chest pain while currently in [facility] ED. EMS administered aspirin and nitroglycerin prior to arrival. Denies associated fevers or chills, headache, diaphoresis, dizziness, lightheadedness, blurry vision, shortness of breath, abdominal pain, diarrhea, dysuria, hematuria, nausea or vomiting, new leg swelling, calf pain. Denies any recent rectal bleeding.  +Cough for last few days, non productive cough.  Per family, patient was weaker and more tired yesterday, and family spoke with Dr Holman, and instructed to stop his lasix yesterday.   Denies hx DVT, PE. Former smoker, quit 30 yrs ago (smoked 2 packs/day).   Cardiology Dr Holman [phone]. PMD Dr Jacobs [phone]. Endo dr Hassan [phone].   Meds: toprol 50mg, asa81, cymbalta, plavix 75mg, dilantin, crestor, donepezil, protonix, humalog 10u before meals, lantus 22u after dinner only.   DNR/DNI per son at bedside. MOLST form provided by son (MOLST signed 10/12/19).

## 2019-12-08 NOTE — H&P ADULT - PROBLEM SELECTOR PLAN 9
no pharm vte given recent GIB  Pneumatic compressions for VTE px no pharm vte given recent GIB  no mechanical vte px now until DVT is ruled out  cont asa, Plavix (H/H is stable)

## 2019-12-08 NOTE — ED PROVIDER NOTE - ATTENDING CONTRIBUTION TO CARE
Patient is a 89 yo M with history of DM, HTN, CAD, PVD, hx of 9 stents on plavix, former smoker (quit 31 yo) here for evaluation of chest pain and associated shortness of breath. He states he didn't sleep well overnight, felt discomfort with breathing. He describes a pressure like chest pain across his chest. No radiation to neck, arms, abdomen. HE denies fevers, chills, nausea, vomiting. + non-productive cough. No history of DVT/ PE. Patient was admitted to NS, treated for pneumonia in October, went to [facility]. He was on 60 mg of lasix (20 mg in AM and 40 mg in PM) but was intermittently delirious per son and daughter and this was discontinued yesterday by his cardiologist Dr. Steven Holman.  Patient denies chest pain at this time. He denies shortness of breath.     VS noted  Gen. no acute distress, Non toxic   HEENT: EOMI, mmm  Lungs: decreased BS in RLL  CVS: RRR   Abd; Soft non tender, non distended   Ext: b/l pitting edema  Skin: no rash  Neuro: awake, alert, non focal clear speech  a/p: shortness of breath - concern for   - Kimberlyn SANDOVAL Patient is a 89 yo M with history of DM, HTN, CAD, PVD, hx of 9 stents on plavix, former smoker (quit 31 yo) here for evaluation of chest pain and associated shortness of breath. He states he didn't sleep well overnight, felt discomfort with breathing. He describes a pressure like chest pain across his chest. No radiation to neck, arms, abdomen. HE denies fevers, chills, nausea, vomiting. + non-productive cough. No history of DVT/ PE. Patient was admitted to NS, treated for pneumonia in October, went to [facility]. He was on 60 mg of lasix (20 mg in AM and 40 mg in PM) but was intermittently delirious per son and daughter and this was discontinued yesterday by his cardiologist Dr. Steven Holman.  Patient denies chest pain at this time. He denies shortness of breath.     VS noted  Gen. elderly, pale  HEENT: EOMI, mmm  Lungs: decreased BS in RLL  CVS: RRR   Abd; Soft non tender, non distended   Ext: b/l pitting edema  Skin: no rash  Neuro: awake, alert, non focal clear speech  a/p: shortness of breath - concern for fluid overload, pleural effusion. Patient with no symptoms of pneumonia at this time. No hx of DVT/ PE, no chest pain at this time. PE less likely. Will check ekg, labs, CXR. Will get CTA Chest if Cr wnl given recent hospitalization rehab. If Cr elevated, non-contrast CT to be done.   - Kimberlyn SANDOVAL

## 2019-12-08 NOTE — ED ADULT NURSE NOTE - CHIEF COMPLAINT QUOTE
Patient brought to ER from home by EMS. Pt has hx about 9 stents and MI last month. Pt here for c/o chest pain radiating to back with difficulty breathing times one day. ASA 162mg, 0.4 spray of Nitro decreased pain and breathing improved some. Rale in lower lung RA 97. Pt is pale at triage...Pt had a transfusion in October/November for rectal bleeding. IVL placed to right hand 20 gauge by EMS. Pt was taken off Lasix because he had a AMS reaction.(rales)

## 2019-12-08 NOTE — H&P ADULT - NSICDXPASTMEDICALHX_GEN_ALL_CORE_FT
PAST MEDICAL HISTORY:  CAD (Coronary Artery Disease)     DM (Diabetes Mellitus)     GI bleed     HTN (Hypertension)     HTN (Hypertension)     OM (osteomyelitis)     PVD (peripheral vascular disease)     Seizure

## 2019-12-08 NOTE — ED PROVIDER NOTE - PROGRESS NOTE DETAILS
CXR concerning for right effusion  Emory Moura MD, PGY3 Emergency Medicine Message left w Dr Holman answering service for admit. Hospitalist requested I call Dr Holman to inquire re: admit to his service vs hospitalist  Emory Moura MD, PGY3 Emergency Medicine Per Dr Holman - admit to Dr Tino Moura MD, PGY3 Emergency Medicine Per Dr Holman - admit to Dr Jiménez.  Elevated BNP, pleural effusions on CXR, concerning for CHF exacerbation. CT Chest non-con pending.   Emory Moura MD, PGY3 Emergency Medicine

## 2019-12-08 NOTE — ED ADULT TRIAGE NOTE - CHIEF COMPLAINT QUOTE
Patient brought to ER from home by EMS. Pt has hx about 9 stents and MI last month. Pt here for c/o chest pain radiating to back with difficulty breathing times one day. ASA 162mg, 0.4 spray of Nitro decreased pain and breathing improved some. Rale in lower lung RA 97. Pt is pale at triage...Pt had a transfusion in October/November for rectal bleeding. IVL placed to right hand 20 gauge by EMS. Patient brought to ER from home by EMS. Pt has hx about 9 stents and MI last month. Pt here for c/o chest pain radiating to back with difficulty breathing times one day. ASA 162mg, 0.4 spray of Nitro decreased pain and breathing improved some. Rale in lower lung RA 97. Pt is pale at triage...Pt had a transfusion in October/November for rectal bleeding. IVL placed to right hand 20 gauge by EMS. Pt was taken off Lasix because he had a AMS reaction.(rales)

## 2019-12-08 NOTE — ED PROVIDER NOTE - CLINICAL SUMMARY MEDICAL DECISION MAKING FREE TEXT BOX
5M w pmx of CAD s/p 9 stents, CEA, DM, HTN, PVD s/p stents, GI bleed presents today w chief complaint of chest pain this AM, worse w inspiration. +cough for few days. Exam as above. Concern for PNA vs PE vs Effusion vs other. Will check labs, stat CXR, likely CT. Currently stable, no acute distress. Will continue to follow up and re-assess. Case discussed with Attending.  Emory Moura MD, PGY3 Emergency Medicine 89M w pmx of CAD s/p 9 stents, CEA, DM, HTN, PVD s/p stents, GI bleed presents today w chief complaint of chest pain this AM, worse w inspiration. +cough for few days. Exam as above. Concern for PNA vs PE vs Effusion vs other. Will check labs, stat CXR, likely CT. Currently stable, no acute distress. Will continue to follow up and re-assess. Case discussed with Attending.  Emory Moura MD, PGY3 Emergency Medicine

## 2019-12-08 NOTE — H&P ADULT - NSHPSOCIALHISTORY_GEN_ALL_CORE
Denies alcohol or drug use    Previous smoker: Quit 30 years ago (3 PPD)    Lives with wife and has aids 10 hours/day

## 2019-12-08 NOTE — H&P ADULT - ATTENDING COMMENTS
Patient evaluated, case discussed with me, chart reviewed, agree with above H/P as reviewed and edited by me.   Dr. TIMMY Jiménez ([phone])

## 2019-12-08 NOTE — ED PROVIDER NOTE - PSH
History of total hip arthroplasty    S/P carotid endarterectomy    S/P peripheral artery angioplasty with stent placement  left superficial femoral artery

## 2019-12-08 NOTE — H&P ADULT - PROBLEM SELECTOR PLAN 10
Patient has MOLST form with him stating DNR/DNI. Both son and patient states those are the patient's wishes. Order placed in EMR and copy of MOLST form left in chart

## 2019-12-09 LAB
ANION GAP SERPL CALC-SCNC: 12 MMO/L — SIGNIFICANT CHANGE UP (ref 7–14)
BASE EXCESS BLDV CALC-SCNC: 3.5 MMOL/L — SIGNIFICANT CHANGE UP
BASOPHILS # BLD AUTO: 0.06 K/UL — SIGNIFICANT CHANGE UP (ref 0–0.2)
BASOPHILS NFR BLD AUTO: 0.6 % — SIGNIFICANT CHANGE UP (ref 0–2)
BLOOD GAS VENOUS - CREATININE: 1.64 MG/DL — HIGH (ref 0.5–1.3)
BLOOD GAS VENOUS - FIO2: 30 — SIGNIFICANT CHANGE UP
BUN SERPL-MCNC: 52 MG/DL — HIGH (ref 7–23)
CALCIUM SERPL-MCNC: 8.7 MG/DL — SIGNIFICANT CHANGE UP (ref 8.4–10.5)
CHLORIDE BLDV-SCNC: 107 MMOL/L — SIGNIFICANT CHANGE UP (ref 96–108)
CHLORIDE SERPL-SCNC: 105 MMOL/L — SIGNIFICANT CHANGE UP (ref 98–107)
CHOLEST SERPL-MCNC: 97 MG/DL — LOW (ref 120–199)
CO2 SERPL-SCNC: 25 MMOL/L — SIGNIFICANT CHANGE UP (ref 22–31)
CREAT SERPL-MCNC: 1.7 MG/DL — HIGH (ref 0.5–1.3)
EOSINOPHIL # BLD AUTO: 0.08 K/UL — SIGNIFICANT CHANGE UP (ref 0–0.5)
EOSINOPHIL NFR BLD AUTO: 0.8 % — SIGNIFICANT CHANGE UP (ref 0–6)
GAS PNL BLDV: 139 MMOL/L — SIGNIFICANT CHANGE UP (ref 136–146)
GLUCOSE BLDC GLUCOMTR-MCNC: 131 MG/DL — HIGH (ref 70–99)
GLUCOSE BLDC GLUCOMTR-MCNC: 152 MG/DL — HIGH (ref 70–99)
GLUCOSE BLDC GLUCOMTR-MCNC: 190 MG/DL — HIGH (ref 70–99)
GLUCOSE BLDC GLUCOMTR-MCNC: 223 MG/DL — HIGH (ref 70–99)
GLUCOSE BLDC GLUCOMTR-MCNC: 241 MG/DL — HIGH (ref 70–99)
GLUCOSE BLDV-MCNC: 157 MG/DL — HIGH (ref 70–99)
GLUCOSE SERPL-MCNC: 156 MG/DL — HIGH (ref 70–99)
HBA1C BLD-MCNC: 7.5 % — HIGH (ref 4–5.6)
HCO3 BLDV-SCNC: 26 MMOL/L — SIGNIFICANT CHANGE UP (ref 20–27)
HCT VFR BLD CALC: 33.6 % — LOW (ref 39–50)
HCT VFR BLDV CALC: 31.8 % — LOW (ref 39–51)
HDLC SERPL-MCNC: 28 MG/DL — LOW (ref 35–55)
HGB BLD-MCNC: 9.9 G/DL — LOW (ref 13–17)
HGB BLDV-MCNC: 10.3 G/DL — LOW (ref 13–17)
IMM GRANULOCYTES NFR BLD AUTO: 0.3 % — SIGNIFICANT CHANGE UP (ref 0–1.5)
LACTATE BLDV-MCNC: 1.6 MMOL/L — SIGNIFICANT CHANGE UP (ref 0.5–2)
LIPID PNL WITH DIRECT LDL SERPL: 52 MG/DL — SIGNIFICANT CHANGE UP
LYMPHOCYTES # BLD AUTO: 1.13 K/UL — SIGNIFICANT CHANGE UP (ref 1–3.3)
LYMPHOCYTES # BLD AUTO: 10.7 % — LOW (ref 13–44)
MAGNESIUM SERPL-MCNC: 2.2 MG/DL — SIGNIFICANT CHANGE UP (ref 1.6–2.6)
MCHC RBC-ENTMCNC: 29.2 PG — SIGNIFICANT CHANGE UP (ref 27–34)
MCHC RBC-ENTMCNC: 29.5 % — LOW (ref 32–36)
MCV RBC AUTO: 99.1 FL — SIGNIFICANT CHANGE UP (ref 80–100)
MONOCYTES # BLD AUTO: 1.77 K/UL — HIGH (ref 0–0.9)
MONOCYTES NFR BLD AUTO: 16.7 % — HIGH (ref 2–14)
NEUTROPHILS # BLD AUTO: 7.52 K/UL — HIGH (ref 1.8–7.4)
NEUTROPHILS NFR BLD AUTO: 70.9 % — SIGNIFICANT CHANGE UP (ref 43–77)
NRBC # FLD: 0 K/UL — SIGNIFICANT CHANGE UP (ref 0–0)
PCO2 BLDV: 49 MMHG — SIGNIFICANT CHANGE UP (ref 41–51)
PH BLDV: 7.38 PH — SIGNIFICANT CHANGE UP (ref 7.32–7.43)
PHOSPHATE SERPL-MCNC: 4.3 MG/DL — SIGNIFICANT CHANGE UP (ref 2.5–4.5)
PLATELET # BLD AUTO: 194 K/UL — SIGNIFICANT CHANGE UP (ref 150–400)
PMV BLD: 11.9 FL — SIGNIFICANT CHANGE UP (ref 7–13)
PO2 BLDV: < 24 MMHG — LOW (ref 35–40)
POTASSIUM BLDV-SCNC: 4.8 MMOL/L — HIGH (ref 3.4–4.5)
POTASSIUM SERPL-MCNC: 5 MMOL/L — SIGNIFICANT CHANGE UP (ref 3.5–5.3)
POTASSIUM SERPL-SCNC: 5 MMOL/L — SIGNIFICANT CHANGE UP (ref 3.5–5.3)
RBC # BLD: 3.39 M/UL — LOW (ref 4.2–5.8)
RBC # FLD: 16.9 % — HIGH (ref 10.3–14.5)
SAO2 % BLDV: 23.8 % — LOW (ref 60–85)
SODIUM SERPL-SCNC: 142 MMOL/L — SIGNIFICANT CHANGE UP (ref 135–145)
TRIGL SERPL-MCNC: 94 MG/DL — SIGNIFICANT CHANGE UP (ref 10–149)
TSH SERPL-MCNC: 2.85 UIU/ML — SIGNIFICANT CHANGE UP (ref 0.27–4.2)
WBC # BLD: 10.59 K/UL — HIGH (ref 3.8–10.5)
WBC # FLD AUTO: 10.59 K/UL — HIGH (ref 3.8–10.5)

## 2019-12-09 PROCEDURE — 93970 EXTREMITY STUDY: CPT | Mod: 26

## 2019-12-09 PROCEDURE — 99222 1ST HOSP IP/OBS MODERATE 55: CPT

## 2019-12-09 RX ADMIN — Medication 40 MILLIGRAM(S): at 05:20

## 2019-12-09 RX ADMIN — INSULIN GLARGINE 18 UNIT(S): 100 INJECTION, SOLUTION SUBCUTANEOUS at 01:21

## 2019-12-09 RX ADMIN — Medication 7 UNIT(S): at 19:17

## 2019-12-09 RX ADMIN — Medication 2: at 13:38

## 2019-12-09 RX ADMIN — INSULIN GLARGINE 18 UNIT(S): 100 INJECTION, SOLUTION SUBCUTANEOUS at 23:09

## 2019-12-09 RX ADMIN — Medication 50 MILLIGRAM(S): at 05:19

## 2019-12-09 RX ADMIN — Medication 81 MILLIGRAM(S): at 13:37

## 2019-12-09 RX ADMIN — DONEPEZIL HYDROCHLORIDE 10 MILLIGRAM(S): 10 TABLET, FILM COATED ORAL at 23:12

## 2019-12-09 RX ADMIN — DULOXETINE HYDROCHLORIDE 30 MILLIGRAM(S): 30 CAPSULE, DELAYED RELEASE ORAL at 18:57

## 2019-12-09 RX ADMIN — Medication 7 UNIT(S): at 13:16

## 2019-12-09 RX ADMIN — Medication 7 UNIT(S): at 09:46

## 2019-12-09 RX ADMIN — Medication 300 MILLIGRAM(S): at 23:12

## 2019-12-09 RX ADMIN — ATORVASTATIN CALCIUM 40 MILLIGRAM(S): 80 TABLET, FILM COATED ORAL at 23:11

## 2019-12-09 RX ADMIN — CLOPIDOGREL BISULFATE 75 MILLIGRAM(S): 75 TABLET, FILM COATED ORAL at 13:37

## 2019-12-09 RX ADMIN — DULOXETINE HYDROCHLORIDE 30 MILLIGRAM(S): 30 CAPSULE, DELAYED RELEASE ORAL at 05:19

## 2019-12-09 RX ADMIN — PANTOPRAZOLE SODIUM 40 MILLIGRAM(S): 20 TABLET, DELAYED RELEASE ORAL at 05:20

## 2019-12-09 RX ADMIN — Medication 1000 UNIT(S): at 15:30

## 2019-12-09 RX ADMIN — Medication 1: at 19:17

## 2019-12-09 NOTE — PROVIDER CONTACT NOTE (OTHER) - ASSESSMENT
Pt using accessory muscles to breathe. Pt on continuous pulse ox; on 4L NC.   VS T98.3; HR 76; /62; RR18 ; SP02 75

## 2019-12-09 NOTE — CONSULT NOTE ADULT - SUBJECTIVE AND OBJECTIVE BOX
Pulmonary Consult Note    HOME ADLER  MRN-[id number]    Chief Complaint: Patient is a 89y old  Male who presents with a chief complaint of chest pain (09 Dec 2019 12:52)      HPI:  Per chart review:  89 y/o M with hx of CAD s/p stents, CEA, PVD (s/p stent), DM, HTN, recent GI bleed and NSTEMI in Oct 2019, mild dementia, presents with chest pain x 1 day. Patent states the chest pain is midsternal, non radiating worse when with deep inspiration. Endorses a cough. His lasix was increased to 60mg by Dr. Holman about 1-2 weeks ago. He also had LE edema which improved after taking the increased dose of Lasix He then noted to have worsening generalized weakness. He was ambulating with a walker prior and the son noted patient could not ambulate well and was using a rollator to get around. Patient does not complain of SOB and since he was not ambulating well unable to note SERRATO. Denies fever, chills, cough, falls, LOC, abdominal pain, nausea, vomiting, melena, hematochezia, diarrhea, constipation or dysuria.     my history is limited as pt states he is in the hospital because his wife wanted him to look good.  ROS:  -  -  All other systems reviewed and negative    PAST MEDICAL HISTORY: HEALTH ISSUES - PROBLEM Dx:  Anemia: Anemia  Do not resuscitate status with supporting documentation: Do not resuscitate status with supporting documentation  Need for prophylactic measure: Need for prophylactic measure  Diabetes mellitus, type 2: Diabetes mellitus, type 2  Seizure: Seizure  HTN (Hypertension): HTN (Hypertension)  CAD (Coronary Artery Disease): CAD (Coronary Artery Disease)  Hypercarbia: Hypercarbia  Chest pain: Chest pain  Acute kidney injury superimposed on chronic kidney disease: Acute kidney injury superimposed on chronic kidney disease  Acute on chronic systolic congestive heart failure: Acute on chronic systolic congestive heart failure          SOCIAL HISTORY:     ACTIVE MEDICATION LIST:  MEDICATIONS  (STANDING):  aspirin enteric coated 81 milliGRAM(s) Oral daily  atorvastatin 40 milliGRAM(s) Oral at bedtime  cholecalciferol 1000 Unit(s) Oral daily  clopidogrel Tablet 75 milliGRAM(s) Oral daily  dextrose 5%. 1000 milliLiter(s) (50 mL/Hr) IV Continuous <Continuous>  dextrose 50% Injectable 12.5 Gram(s) IV Push once  dextrose 50% Injectable 25 Gram(s) IV Push once  dextrose 50% Injectable 25 Gram(s) IV Push once  donepezil 10 milliGRAM(s) Oral at bedtime  DULoxetine 30 milliGRAM(s) Oral every 12 hours  furosemide   Injectable 40 milliGRAM(s) IV Push daily  insulin glargine Injectable (LANTUS) 18 Unit(s) SubCutaneous at bedtime  insulin lispro (HumaLOG) corrective regimen sliding scale   SubCutaneous three times a day before meals  insulin lispro (HumaLOG) corrective regimen sliding scale   SubCutaneous at bedtime  insulin lispro Injectable (HumaLOG) 7 Unit(s) SubCutaneous three times a day before meals  metoprolol succinate ER 50 milliGRAM(s) Oral daily  pantoprazole    Tablet 40 milliGRAM(s) Oral before breakfast  phenytoin   Capsule 300 milliGRAM(s) Oral at bedtime    MEDICATIONS  (PRN):  dextrose 40% Gel 15 Gram(s) Oral once PRN Blood Glucose LESS THAN 70 milliGRAM(s)/deciliter  glucagon  Injectable 1 milliGRAM(s) IntraMuscular once PRN Glucose LESS THAN 70 milligrams/deciliter  polyethylene glycol 3350 17 Gram(s) Oral daily PRN Constipation      EXAM:  Vital Signs Last 24 Hrs  T(C): 36.7 (09 Dec 2019 10:00), Max: 36.9 (08 Dec 2019 14:44)  T(F): 98.1 (09 Dec 2019 10:00), Max: 98.5 (08 Dec 2019 23:53)  HR: 81 (09 Dec 2019 10:00) (73 - 81)  BP: 129/49 (09 Dec 2019 10:00) (129/49 - 158/73)  BP(mean): --  RR: 22 (09 Dec 2019 10:00) (17 - 24)  SpO2: 97% (09 Dec 2019 10:00) (95% - 100%)  GENERAL: No acute distress  NEURO: Alert and oriented x 3  LUNGS: Clear to auscultation bilaterally, no rales or wheezes  CV: S1/S2, no murmur  ABDOMEN: +BS, nontender  EXTREMITIES: no clubbing, cyanosis, edema    LABS/IMAGING: reviewed    PROBLEM LIST:  89yMale with HEALTH ISSUES - PROBLEM Dx:  Anemia: Anemia  Do not resuscitate status with supporting documentation: Do not resuscitate status with supporting documentation  Need for prophylactic measure: Need for prophylactic measure  Diabetes mellitus, type 2: Diabetes mellitus, type 2  Seizure: Seizure  HTN (Hypertension): HTN (Hypertension)  CAD (Coronary Artery Disease): CAD (Coronary Artery Disease)  Hypercarbia: Hypercarbia  Chest pain: Chest pain  Acute kidney injury superimposed on chronic kidney disease: Acute kidney injury superimposed on chronic kidney disease  Acute on chronic systolic congestive heart failure: Acute on chronic systolic congestive heart failure            RECS:  -  -    Thank you for this consultation, please feel free to call with any questions [phone]  Kendra Brody MD Pulmonary Consult Note    HOME ADLER  MRN-[id number]    Chief Complaint: Patient is a 89y old  Male who presents with a chief complaint of chest pain (09 Dec 2019 12:52)      HPI:  Per chart review:  89 y/o M with hx of CAD s/p stents, CEA, PVD (s/p stent), DM, HTN, recent GI bleed and NSTEMI in Oct 2019, mild dementia, presents with chest pain x 1 day. Patent states the chest pain is midsternal, non radiating worse when with deep inspiration. Endorses a cough. His lasix was increased to 60mg by Dr. Holman about 1-2 weeks ago. He also had LE edema which improved after taking the increased dose of Lasix He then noted to have worsening generalized weakness. He was ambulating with a walker prior and the son noted patient could not ambulate well and was using a rollator to get around. Patient does not complain of SOB and since he was not ambulating well unable to note SERRATO. Denies fever, chills, cough, falls, LOC, abdominal pain, nausea, vomiting, melena, hematochezia, diarrhea, constipation or dysuria.     my history is limited as pt states he is in the hospital because his wife "wanted him to look good" seems confused.    ROS:  -unable to reliably obtain as pt seems confused    PAST MEDICAL HISTORY: HEALTH ISSUES - PROBLEM Dx:  Anemia: Anemia  Do not resuscitate status with supporting documentation: Do not resuscitate status with supporting documentation  Need for prophylactic measure: Need for prophylactic measure  Diabetes mellitus, type 2: Diabetes mellitus, type 2  Seizure: Seizure  HTN (Hypertension): HTN (Hypertension)  CAD (Coronary Artery Disease): CAD (Coronary Artery Disease)  Hypercarbia: Hypercarbia  Chest pain: Chest pain  Acute kidney injury superimposed on chronic kidney disease: Acute kidney injury superimposed on chronic kidney disease  Acute on chronic systolic congestive heart failure: Acute on chronic systolic congestive heart failure      SOCIAL HISTORY:     ACTIVE MEDICATION LIST:  MEDICATIONS  (STANDING):  aspirin enteric coated 81 milliGRAM(s) Oral daily  atorvastatin 40 milliGRAM(s) Oral at bedtime  cholecalciferol 1000 Unit(s) Oral daily  clopidogrel Tablet 75 milliGRAM(s) Oral daily  dextrose 5%. 1000 milliLiter(s) (50 mL/Hr) IV Continuous <Continuous>  dextrose 50% Injectable 12.5 Gram(s) IV Push once  dextrose 50% Injectable 25 Gram(s) IV Push once  dextrose 50% Injectable 25 Gram(s) IV Push once  donepezil 10 milliGRAM(s) Oral at bedtime  DULoxetine 30 milliGRAM(s) Oral every 12 hours  furosemide   Injectable 40 milliGRAM(s) IV Push daily  insulin glargine Injectable (LANTUS) 18 Unit(s) SubCutaneous at bedtime  insulin lispro (HumaLOG) corrective regimen sliding scale   SubCutaneous three times a day before meals  insulin lispro (HumaLOG) corrective regimen sliding scale   SubCutaneous at bedtime  insulin lispro Injectable (HumaLOG) 7 Unit(s) SubCutaneous three times a day before meals  metoprolol succinate ER 50 milliGRAM(s) Oral daily  pantoprazole    Tablet 40 milliGRAM(s) Oral before breakfast  phenytoin   Capsule 300 milliGRAM(s) Oral at bedtime    MEDICATIONS  (PRN):  dextrose 40% Gel 15 Gram(s) Oral once PRN Blood Glucose LESS THAN 70 milliGRAM(s)/deciliter  glucagon  Injectable 1 milliGRAM(s) IntraMuscular once PRN Glucose LESS THAN 70 milligrams/deciliter  polyethylene glycol 3350 17 Gram(s) Oral daily PRN Constipation      EXAM:  Vital Signs Last 24 Hrs  T(C): 36.7 (09 Dec 2019 10:00), Max: 36.9 (08 Dec 2019 14:44)  T(F): 98.1 (09 Dec 2019 10:00), Max: 98.5 (08 Dec 2019 23:53)  HR: 81 (09 Dec 2019 10:00) (73 - 81)  BP: 129/49 (09 Dec 2019 10:00) (129/49 - 158/73)  BP(mean): --  RR: 22 (09 Dec 2019 10:00) (17 - 24)  SpO2: 97% (09 Dec 2019 10:00) (95% - 100%)--on 3L nasal canula  GENERAL: No acute distress  NEURO: awake and alert but not oriented  LUNGS: decreased bs   CV: S1/S2,  ABDOMEN: +BS, nontender  EXTREMITIES: no clubbing, cyanosis, edema    LABS/IMAGING: reviewed                        9.9    10.59 )-----------( 194      ( 09 Dec 2019 04:25 )             33.6   12-09    142  |  105  |  52<H>  ----------------------------<  156<H>  5.0   |  25  |  1.70<H>    Ca    8.7      09 Dec 2019 04:25  Phos  4.3     12-09  Mg     2.2     12-09    TPro  6.9  /  Alb  3.4  /  TBili  0.4  /  DBili  x   /  AST  39  /  ALT  34  /  AlkPhos  144<H>  12-08  < from: CT Chest No Cont (12.08.19 @ 17:11) >  INDINGS:    LUNGS AND AIRWAYS: Emphysema. Right middle, lower and left lower lobe   partial atelectasis. Patent central airways.    PLEURA: Moderate right and small left pleural effusions.    MEDIASTINUM AND LUCA: No lymphadenopathy.    VESSELS: Calcification of the aorta.    HEART: Heart size is normal. No pericardial effusion. Coronary artery   calcifications.    CHEST WALL AND LOWER NECK: Within normal limits.    VISUALIZED UPPER ABDOMEN: Cholelithiasis.    BONES: Degenerative changes of the spine.    IMPRESSION:     No pneumonia.    Moderate right and small left pleural effusions.    < end of copied text >      PROBLEM LIST:  89yMale with HEALTH ISSUES - PROBLEM Dx:  Emphysema  bilateral pleural effusions  Anemia: Anemia  Diabetes mellitus, type 2: Diabetes mellitus, type 2  Seizure: Seizure  HTN (Hypertension): HTN (Hypertension)  CAD (Coronary Artery Disease): CAD (Coronary Artery Disease)  Hypercarbia: Hypercarbia  Chest pain: Chest pain  Acute kidney injury superimposed on chronic kidney disease: Acute kidney injury superimposed on chronic kidney disease  Acute on chronic systolic congestive heart failure: Acute on chronic systolic congestive heart failure    RECS:  -bilat effusions likely related to CHF, suggest cont to diurese, will monitor cxr, if no improvement could consider pigtail catheter by IR.  -appreciate cardiology  -add duonebs for dyspnea/emphysema  -wean supplemental O2 as tolerated  -incentive spirometry    Thank you for this consultation, please feel free to call with any questions [phone]  Kendra Brody MD

## 2019-12-09 NOTE — CONSULT NOTE ADULT - SUBJECTIVE AND OBJECTIVE BOX
PLeuritic cp sob    HPI:  89 y/o M with hx of CAD s/p stents, CEA, PVD (s/p stent), DM, HTN, recent GI bleed and NSTEMI in Oct 2019, mild dementia [A&Ox4]  presents with chest pain x 1 day. Patent states the chest pain is midsternal, non radiating worse when with deep inspiration. Endorses a cough. His lasix was increased to 60mg by Dr. Holman about 1-2 weeks ago. He also had LE edema which improved after taking the increased dose of Lasix He then noted to have worsening generalized weakness. He was ambulating with a walker prior and the son noted patient could not ambulate well and was using a rollator to get around. Patient does not complain of SOB and since he was not ambulating well unable to note SERRATO. Denies fever, chills, cough, falls, LOC, abdominal pain, nausea, vomiting, melena, hematochezia, diarrhea, constipation or dysuria.     Recent pulmonary infection MI renal insufficiency  today less cp less sob      MEDICATIONS:  MEDICATIONS  (STANDING):  aspirin enteric coated 81 milliGRAM(s) Oral daily  atorvastatin 40 milliGRAM(s) Oral at bedtime  cholecalciferol 1000 Unit(s) Oral daily  clopidogrel Tablet 75 milliGRAM(s) Oral daily  dextrose 5%. 1000 milliLiter(s) (50 mL/Hr) IV Continuous <Continuous>  dextrose 50% Injectable 12.5 Gram(s) IV Push once  dextrose 50% Injectable 25 Gram(s) IV Push once  dextrose 50% Injectable 25 Gram(s) IV Push once  donepezil 10 milliGRAM(s) Oral at bedtime  DULoxetine 30 milliGRAM(s) Oral every 12 hours  furosemide   Injectable 40 milliGRAM(s) IV Push daily  insulin glargine Injectable (LANTUS) 18 Unit(s) SubCutaneous at bedtime  insulin lispro (HumaLOG) corrective regimen sliding scale   SubCutaneous three times a day before meals  insulin lispro (HumaLOG) corrective regimen sliding scale   SubCutaneous at bedtime  insulin lispro Injectable (HumaLOG) 7 Unit(s) SubCutaneous three times a day before meals  metoprolol succinate ER 50 milliGRAM(s) Oral daily  pantoprazole    Tablet 40 milliGRAM(s) Oral before breakfast  phenytoin   Capsule 300 milliGRAM(s) Oral at bedtime      PHYSICAL EXAM:  T(C): 36.7 (12-09-19 @ 05:18), Max: 36.9 (12-08-19 @ 14:44)  HR: 81 (12-09-19 @ 05:18) (73 - 81)  BP: 151/60 (12-09-19 @ 05:18) (134/55 - 158/73)  RR: 19 (12-09-19 @ 05:18) (17 - 24)  SpO2: 97% (12-09-19 @ 05:18) (95% - 100%)  Wt(kg): --  I&O's Summary        Appearance: Normal awake alert NAD  HEENT:   Normal oral mucosa, PERRL, EOMI	  Cardiovascular: Normal S1 S2, elevated JVD, No murmurs ,  Respiratory: Lungs decreased BS right base 1/3 up  Gastrointestinal:  Soft, Non-tender, + BS	  Skin: No rashes, No ecchymoses, No cyanosis, warm to touch  Musculoskeletal: Normal range of motion, normal strength  Psychiatry:  Mood & affect appropriate  Ext: trace edema  Peripheral pulses palpable 2+ bilaterally      LABS:    CARDIAC MARKERS:                                9.9    10.59 )-----------( 194      ( 09 Dec 2019 04:25 )             33.6     12-09    142  |  105  |  52<H>  ----------------------------<  156<H>  5.0   |  25  |  1.70<H>    Ca    8.7      09 Dec 2019 04:25  Phos  4.3     12-09  Mg     2.2     12-09    TPro  6.9  /  Alb  3.4  /  TBili  0.4  /  DBili  x   /  AST  39  /  ALT  34  /  AlkPhos  144<H>  12-08    proBNP: Serum Pro-Brain Natriuretic Peptide: 72049 pg/mL (12-08 @ 14:40)    Lipid Profile: total cholesterol 97  LDL 52  HDL 28  HDl/Total Ratio --  Trig 94    HgA1c: Hemoglobin A1C, Whole Blood: 7.5 % (12-09 @ 04:25)    TSH: Thyroid Stimulating Hormone, Serum: 2.85 uIU/mL (12-09 @ 04:25)            TELEMETRY: 	    ECG: NSR incomplete RBBB 	  RADIOLOGY:   < from: Xray Chest 1 View- PORTABLE-Urgent (12.08.19 @ 14:52) >  NTERPRETATION:  Right pleural effusion with right lower lung   atelectasis. Left pleural effusion with associated atelectasis      < from: CT Chest No Cont (12.08.19 @ 17:11) >  NTERPRETATION:  Moderate right and small left pleural effusions.     Near-complete atelectasis of the bilateral lower lobes.     Emphysema.    Cholelithiasis.    < from: TTE with Doppler (w/Cont) (10.13.19 @ 10:40) >  Conclusions:  1. Mitral annular calcification, otherwise normal mitral  valve.  2. Calcified aortic valve with probably normal opening.  3. Moderately dilated left atrium.  LA volume index = 46  cc/m2.  4. Moderate segmental left ventricular systolic  dysfunction. The basal to mid lateral and basal to mid  anterior walls are best preserved, all other walls are  hypokinetic.  Endocardial visualization enhanced with  intravenous injection of Ultrasonic Enhancing Agent  (Definity).  5. The right ventricle is not well visualized grossly  enlarged.  ------------------------------------------------------------------------  Confirmed on  10/13/2019 - 16:01:40 by Anthony Cormier M.D.  ------------------------------------------------------------------------

## 2019-12-09 NOTE — CONSULT NOTE ADULT - ASSESSMENT
1. chronic ischemic heart disease S?P CABG s/p recent MI  2. moderate to severe segmental LV dysfunction  3. atelectasis RLL  4. pleural effusion right greater than left  5. CHF  6. CRI    Recommend  1. IV lasix  2.daily weights  3. pulmonary consult/ID followup  4. OOB to chair

## 2019-12-09 NOTE — PHYSICAL THERAPY INITIAL EVALUATION ADULT - DIAGNOSIS, PT EVAL
Encounter Date: 6/26/2019       History     Chief Complaint   Patient presents with    Fever     pt's mother reports that pt began grabbing at his stomach and had 2 episodes of vomiting tonight; pt is autistic and nonverbal but mother reports that pt was acting like his stomach was hurting him; pt also febrile at triage; pt did not receive any medications for fever tonight    Vomiting     Chief complaint:  Fever    History of present illness:  Patient is a 3-year-old male presented by his mother who reports fever and nausea and vomiting as well as abnormal breathing prior to arrival.  She states that this began late tonight.  No medications were given prior to arrival.  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    The history is provided by the mother. No  was used.     Review of patient's allergies indicates:  No Known Allergies  Past Medical History:   Diagnosis Date    Autism      History reviewed. No pertinent surgical history.  Family History   Problem Relation Age of Onset    No Known Problems Mother     No Known Problems Father      Social History     Tobacco Use    Smoking status: Never Smoker    Smokeless tobacco: Never Used   Substance Use Topics    Alcohol use: Never     Frequency: Never    Drug use: Not on file     Review of Systems   Unable to perform ROS: Patient nonverbal   Constitutional: Positive for fever. Negative for activity change, appetite change, chills, fatigue and unexpected weight change.   HENT: Negative for congestion, ear discharge, ear pain, sneezing, sore throat and voice change.    Eyes: Negative for discharge and itching.   Respiratory: Negative for cough and wheezing.         Abnormal breathing   Cardiovascular: Negative for chest pain.   Gastrointestinal: Positive for abdominal pain. Negative for constipation, diarrhea, nausea and vomiting.   Endocrine: Negative for polydipsia, polyphagia and polyuria.   Genitourinary: Negative for dysuria, frequency  and urgency.   Musculoskeletal: Negative for arthralgias, back pain, neck pain and neck stiffness.   Skin: Negative for rash and wound.   Neurological: Negative for seizures and weakness.   Hematological: Negative for adenopathy. Does not bruise/bleed easily.   Psychiatric/Behavioral: Negative for sleep disturbance.       Physical Exam     Initial Vitals [06/26/19 0350]   BP Pulse Resp Temp SpO2   -- (!) 170 (!) 30 (!) 105.1 °F (40.6 °C) 100 %      MAP       --         Physical Exam    Nursing note and vitals reviewed.  Constitutional: He appears well-developed and well-nourished. He is not diaphoretic. He is active, playful and uncooperative. He is crying. He cries on exam.  Non-toxic appearance. He does not have a sickly appearance. He does not appear ill. No distress.   HENT:   Head: Normocephalic and atraumatic. No signs of injury.   Right Ear: Tympanic membrane normal.   Left Ear: Tympanic membrane normal.   Nose: Nasal discharge present.   Mouth/Throat: Mucous membranes are moist. Dentition is normal. No dental caries. No tonsillar exudate. Oropharynx is clear. Pharynx is normal.   Eyes: Conjunctivae, EOM and lids are normal. Visual tracking is normal. Pupils are equal, round, and reactive to light. Right eye exhibits no discharge. Left eye exhibits no discharge.   Neck: Normal range of motion and full passive range of motion without pain. Neck supple. No neck rigidity or neck adenopathy.   Cardiovascular: Regular rhythm, S1 normal and S2 normal.   Pulmonary/Chest: Effort normal and breath sounds normal. No nasal flaring or stridor. No respiratory distress. He has no wheezes. He has no rhonchi. He has no rales. He exhibits no retraction.   Abdominal: Soft. He exhibits no distension and no mass. There is no hepatosplenomegaly. There is no tenderness. There is no rebound and no guarding. No hernia.   Musculoskeletal: Normal range of motion. He exhibits no edema, tenderness, deformity or signs of injury.    Neurological: He is alert.   Skin: Skin is warm and dry. Capillary refill takes less than 2 seconds.         ED Course   Procedures  Labs Reviewed   THROAT SCREEN, RAPID          Imaging Results    None          Medical Decision Making:   Initial Assessment:   3-year-old male who is presented by his mother for fever, vomiting, abnormal breathing  Differential Diagnosis:   Febrile illness, viral illness, URI  ED Management:  I have ordered antipyretics as well as Zofran and rapid strep.    Fever reduced from 105.1 to 102.5, pt has tolerated oral intake of juice.  Still nontoxic appearing.  Will d/c to home in fair condition to f/u with pediatrician today.  Symptomatic therapies and return precautions on AVS.                     ED Course as of Jun 26 0554   Wed Jun 26, 2019   0545 RAPID STREP A SCREEN: Negative [VC]      ED Course User Index  [VC] Erwin Gutiérrez DNP     Clinical Impression:       ICD-10-CM ICD-9-CM   1. Febrile illness R50.9 780.60         Disposition:   Disposition: Discharged  Condition: Stable                        Erwin Gutiérrez DNP  06/26/19 0555     Deconditioning due to CHF

## 2019-12-10 LAB
ANION GAP SERPL CALC-SCNC: 11 MMO/L — SIGNIFICANT CHANGE UP (ref 7–14)
BASOPHILS # BLD AUTO: 0.03 K/UL — SIGNIFICANT CHANGE UP (ref 0–0.2)
BASOPHILS NFR BLD AUTO: 0.4 % — SIGNIFICANT CHANGE UP (ref 0–2)
BUN SERPL-MCNC: 50 MG/DL — HIGH (ref 7–23)
CALCIUM SERPL-MCNC: 8.5 MG/DL — SIGNIFICANT CHANGE UP (ref 8.4–10.5)
CHLORIDE SERPL-SCNC: 104 MMOL/L — SIGNIFICANT CHANGE UP (ref 98–107)
CO2 SERPL-SCNC: 26 MMOL/L — SIGNIFICANT CHANGE UP (ref 22–31)
CREAT SERPL-MCNC: 1.59 MG/DL — HIGH (ref 0.5–1.3)
EOSINOPHIL # BLD AUTO: 0.13 K/UL — SIGNIFICANT CHANGE UP (ref 0–0.5)
EOSINOPHIL NFR BLD AUTO: 1.5 % — SIGNIFICANT CHANGE UP (ref 0–6)
GLUCOSE BLDC GLUCOMTR-MCNC: 110 MG/DL — HIGH (ref 70–99)
GLUCOSE BLDC GLUCOMTR-MCNC: 124 MG/DL — HIGH (ref 70–99)
GLUCOSE BLDC GLUCOMTR-MCNC: 139 MG/DL — HIGH (ref 70–99)
GLUCOSE BLDC GLUCOMTR-MCNC: 170 MG/DL — HIGH (ref 70–99)
GLUCOSE SERPL-MCNC: 142 MG/DL — HIGH (ref 70–99)
HCT VFR BLD CALC: 31.6 % — LOW (ref 39–50)
HGB BLD-MCNC: 9.3 G/DL — LOW (ref 13–17)
IMM GRANULOCYTES NFR BLD AUTO: 0.5 % — SIGNIFICANT CHANGE UP (ref 0–1.5)
LYMPHOCYTES # BLD AUTO: 1 K/UL — SIGNIFICANT CHANGE UP (ref 1–3.3)
LYMPHOCYTES # BLD AUTO: 11.7 % — LOW (ref 13–44)
MAGNESIUM SERPL-MCNC: 2.2 MG/DL — SIGNIFICANT CHANGE UP (ref 1.6–2.6)
MCHC RBC-ENTMCNC: 28.8 PG — SIGNIFICANT CHANGE UP (ref 27–34)
MCHC RBC-ENTMCNC: 29.4 % — LOW (ref 32–36)
MCV RBC AUTO: 97.8 FL — SIGNIFICANT CHANGE UP (ref 80–100)
MONOCYTES # BLD AUTO: 1.24 K/UL — HIGH (ref 0–0.9)
MONOCYTES NFR BLD AUTO: 14.5 % — HIGH (ref 2–14)
NEUTROPHILS # BLD AUTO: 6.11 K/UL — SIGNIFICANT CHANGE UP (ref 1.8–7.4)
NEUTROPHILS NFR BLD AUTO: 71.4 % — SIGNIFICANT CHANGE UP (ref 43–77)
NRBC # FLD: 0 K/UL — SIGNIFICANT CHANGE UP (ref 0–0)
NT-PROBNP SERPL-SCNC: SIGNIFICANT CHANGE UP PG/ML
PHOSPHATE SERPL-MCNC: 3.9 MG/DL — SIGNIFICANT CHANGE UP (ref 2.5–4.5)
PLATELET # BLD AUTO: 195 K/UL — SIGNIFICANT CHANGE UP (ref 150–400)
PMV BLD: 12.1 FL — SIGNIFICANT CHANGE UP (ref 7–13)
POTASSIUM SERPL-MCNC: 4.2 MMOL/L — SIGNIFICANT CHANGE UP (ref 3.5–5.3)
POTASSIUM SERPL-SCNC: 4.2 MMOL/L — SIGNIFICANT CHANGE UP (ref 3.5–5.3)
RBC # BLD: 3.23 M/UL — LOW (ref 4.2–5.8)
RBC # FLD: 16.7 % — HIGH (ref 10.3–14.5)
SODIUM SERPL-SCNC: 141 MMOL/L — SIGNIFICANT CHANGE UP (ref 135–145)
WBC # BLD: 8.55 K/UL — SIGNIFICANT CHANGE UP (ref 3.8–10.5)
WBC # FLD AUTO: 8.55 K/UL — SIGNIFICANT CHANGE UP (ref 3.8–10.5)

## 2019-12-10 PROCEDURE — 99232 SBSQ HOSP IP/OBS MODERATE 35: CPT

## 2019-12-10 RX ORDER — DEXTROSE 50 % IN WATER 50 %
12.5 SYRINGE (ML) INTRAVENOUS ONCE
Refills: 0 | Status: COMPLETED | OUTPATIENT
Start: 2019-12-10 | End: 2019-12-10

## 2019-12-10 RX ADMIN — Medication 1000 UNIT(S): at 12:01

## 2019-12-10 RX ADMIN — DULOXETINE HYDROCHLORIDE 30 MILLIGRAM(S): 30 CAPSULE, DELAYED RELEASE ORAL at 16:53

## 2019-12-10 RX ADMIN — Medication 7 UNIT(S): at 12:01

## 2019-12-10 RX ADMIN — Medication 81 MILLIGRAM(S): at 12:01

## 2019-12-10 RX ADMIN — Medication 7 UNIT(S): at 08:15

## 2019-12-10 RX ADMIN — DULOXETINE HYDROCHLORIDE 30 MILLIGRAM(S): 30 CAPSULE, DELAYED RELEASE ORAL at 07:15

## 2019-12-10 RX ADMIN — Medication 40 MILLIGRAM(S): at 07:14

## 2019-12-10 RX ADMIN — Medication 50 MILLIGRAM(S): at 07:16

## 2019-12-10 RX ADMIN — Medication 1: at 16:51

## 2019-12-10 RX ADMIN — Medication 7 UNIT(S): at 16:50

## 2019-12-10 RX ADMIN — CLOPIDOGREL BISULFATE 75 MILLIGRAM(S): 75 TABLET, FILM COATED ORAL at 16:51

## 2019-12-10 RX ADMIN — PANTOPRAZOLE SODIUM 40 MILLIGRAM(S): 20 TABLET, DELAYED RELEASE ORAL at 07:15

## 2019-12-10 NOTE — DIETITIAN INITIAL EVALUATION ADULT. - OTHER INFO
Patient is a 89 y/o male with a past medical Hx inclusive of CAD s/p stents, CEA, PVD (s/p stent), DM, HTN, recent GI bleed and NSTEMI in Oct 2019, mild dementia [A&Ox4], p/w chest pain x 1 day, who was admitted for acute on chronic CHF.    RD met with patient today. Patient was able to answer to RD's questions. Per patient, appetite was good prior to admission, and patient has been on a soft diet as recommended by SLP who evaluated patient on his last hospital admission (SLP swallow eval on 10/16 recommended soft diet with thin liquids). Patient states that he tolerated soft diet/thin liquid well at home; however, per RN, patient was choking on food and vomited x1 with undigested food at breakfast this morning. Team is aware and SLP swallow evaluation ordered today, pending result. Patient reports that he normally eats well at home and occasionally drinks Glucerna Shake x1 bottle/day. NKFA reported. Patient endorsed good appetite at this time despite he vomited/choked this morning. Per chart, HgbA1C 7.5% (12/9) -- good long term glycemic control, FSBG 124-241 over the past 24 hrs. Patient reports that he checks FSBG 3x daily and is on oral DM rx & insulin at home. Patient previously had received DM diet education by an RD prior to this admission. Patient has no questions about his diet at this time.     Reported #, previous wt record 157.6# on 10/11 and current adm wt 167.5# -- wt gain of ~10#/6% over the past ~2 months, possibly related to fluid 2/2 pleural effusion/acute on chronic CHF. Currently on fluid restriction 1200ml/d and low sodium diet restriction per order. Patient is aware of the fluid restriction. Anticipate wt loss or wt trend fluctuation due to use of diuretics, will follow trend.

## 2019-12-10 NOTE — SWALLOW BEDSIDE ASSESSMENT ADULT - SWALLOW EVAL: DIAGNOSIS
1. Functional oral phase for puree consistency, mechanical soft solids, solids, and thin liquids marked by mildly extended mastication for solids though able to breakdown bolus, adequate bolus manipulation, adequate anterior to posterior transport and functional oral transit time 2. Functional pharyngeal phase for all consistencies presented marked by adequate laryngeal elevation upon digital palpation and NO overt s/s of laryngeal penetration/aspiration noted

## 2019-12-10 NOTE — SWALLOW BEDSIDE ASSESSMENT ADULT - SWALLOW EVAL: RECOMMENDED FEEDING/EATING TECHNIQUES
oral hygiene/small sips/bites/position upright (90 degrees)/maintain upright posture during/after eating for 30 mins

## 2019-12-10 NOTE — SWALLOW BEDSIDE ASSESSMENT ADULT - COMMENTS
Patient is a "89 y/o M with CAD s/p 9 stents, CEA, DM, HTN, PVD s/p GI bleed presents with chest pain and generalized weakness". Chest CT completed on 12/8 reported "no PNA".     Clinical Bedside Swallow Evaluation completed during admission at [facility] on 10/18/19 recommended soft solids and thin liquids (See report for details).     Patient seen upright at bedside with nasal canula in place. Patient was alert/awake and verbally responsive to simple questions. Patient able to follow simple directions.

## 2019-12-10 NOTE — DIETITIAN INITIAL EVALUATION ADULT. - ENERGY NEEDS
Ht: 177.8cm. Wt: 76kg. BMI 24. # +/-10%.  Skin: skin tear per flowsheet, no pressure injuries, noted sacral MAD and perineum IAD.  Edema: R knee 1+.

## 2019-12-10 NOTE — DIETITIAN INITIAL EVALUATION ADULT. - ADD RECOMMEND
1. Monitor weights, labs, BM's, skin integrity, PO intake/tolerance. 2. Pending SLP swallow eval result, will follow. 3. Please Encourage po intake as tolerated, assist with meals and menu selections, provide alternatives PRN.

## 2019-12-10 NOTE — PROGRESS NOTE ADULT - SUBJECTIVE AND OBJECTIVE BOX
PULMONARY PROGRESS NOTE    HOME ADELR  MRN-[id number]    Patient is a 89y old  Male who presents with a chief complaint of chest pain (09 Dec 2019 14:35)  found to have bilateral effusions    HPI: Comfortable this AM, no complaints  -  -  -    ROS: denies chest pain  -  -    ACTIVE MEDICATION LIST:  MEDICATIONS  (STANDING):  aspirin enteric coated 81 milliGRAM(s) Oral daily  atorvastatin 40 milliGRAM(s) Oral at bedtime  cholecalciferol 1000 Unit(s) Oral daily  clopidogrel Tablet 75 milliGRAM(s) Oral daily  dextrose 5%. 1000 milliLiter(s) (50 mL/Hr) IV Continuous <Continuous>  dextrose 50% Injectable 12.5 Gram(s) IV Push once  dextrose 50% Injectable 25 Gram(s) IV Push once  dextrose 50% Injectable 25 Gram(s) IV Push once  donepezil 10 milliGRAM(s) Oral at bedtime  DULoxetine 30 milliGRAM(s) Oral every 12 hours  furosemide   Injectable 40 milliGRAM(s) IV Push daily  insulin glargine Injectable (LANTUS) 18 Unit(s) SubCutaneous at bedtime  insulin lispro (HumaLOG) corrective regimen sliding scale   SubCutaneous three times a day before meals  insulin lispro (HumaLOG) corrective regimen sliding scale   SubCutaneous at bedtime  insulin lispro Injectable (HumaLOG) 7 Unit(s) SubCutaneous three times a day before meals  metoprolol succinate ER 50 milliGRAM(s) Oral daily  pantoprazole    Tablet 40 milliGRAM(s) Oral before breakfast  phenytoin   Capsule 300 milliGRAM(s) Oral at bedtime    MEDICATIONS  (PRN):  dextrose 40% Gel 15 Gram(s) Oral once PRN Blood Glucose LESS THAN 70 milliGRAM(s)/deciliter  glucagon  Injectable 1 milliGRAM(s) IntraMuscular once PRN Glucose LESS THAN 70 milligrams/deciliter  polyethylene glycol 3350 17 Gram(s) Oral daily PRN Constipation      EXAM:  Vital Signs Last 24 Hrs  T(C): 36.4 (10 Dec 2019 06:15), Max: 36.8 (09 Dec 2019 14:12)  T(F): 97.6 (10 Dec 2019 06:15), Max: 98.3 (09 Dec 2019 21:58)  HR: 72 (10 Dec 2019 06:15) (72 - 81)  BP: 132/57 (10 Dec 2019 06:15) (129/49 - 145/75)  BP(mean): --  RR: 20 (10 Dec 2019 06:15) (16 - 22)  SpO2: 98% (10 Dec 2019 06:15) (96% - 100%)    GENERAL: The patient is awake and alert in no apparent distress.     SKIN: Warm, dry, no rashes    LUNGS: reduced air entry, resp slightly labored.    HEART: Regular rate and rhythm without murmur.    ABDOMEN: +BS, Soft, Nontender    EXTREMITIES: No clubbing, cyanosis, edema                              9.3    8.55  )-----------( 195      ( 10 Dec 2019 05:40 )             31.6       12-10    141  |  104  |  50<H>  ----------------------------<  142<H>  4.2   |  26  |  1.59<H>    Ca    8.5      10 Dec 2019 05:40  Phos  3.9     12-10  Mg     2.2     12-10    TPro  6.9  /  Alb  3.4  /  TBili  0.4  /  DBili  x   /  AST  39  /  ALT  34  /  AlkPhos  144<H>  12-08    CT-bilat effusions      LIVER FUNCTIONS - ( 08 Dec 2019 14:40 )  Alb: 3.4 g/dL / Pro: 6.9 g/dL / ALK PHOS: 144 u/L / ALT: 34 u/L / AST: 39 u/L / GGT: x             PROBLEM LIST:  89y Male with HEALTH ISSUES - PROBLEM Dx:  Anemia: Anemia  Do not resuscitate status with supporting documentation: Do not resuscitate status with supporting documentation  Need for prophylactic measure: Need for prophylactic measure  Diabetes mellitus, type 2: Diabetes mellitus, type 2  Seizure: Seizure  HTN (Hypertension): HTN (Hypertension)  CAD (Coronary Artery Disease): CAD (Coronary Artery Disease)  Hypercarbia: Hypercarbia  Chest pain: Chest pain  Acute kidney injury superimposed on chronic kidney disease: Acute kidney injury superimposed on chronic kidney disease  Acute on chronic systolic congestive heart failure: Acute on chronic systolic congestive heart failure      RECS:  Continue diuresis  f/u CXR 1-2 days      Neeraj Venegas MD  [phone]

## 2019-12-10 NOTE — DIETITIAN INITIAL EVALUATION ADULT. - PERTINENT LABORATORY DATA
12-10 Na141 mmol/L Glu 142 mg/dL<H> K+ 4.2 mmol/L Cr  1.59 mg/dL<H> BUN 50 mg/dL<H> 12-10 Phos 3.9 mg/dL 12-08 Alb 3.4 g/dL 12-09 [id number] 7.5 %<H> 12-09 Chol 97 mg/dL<L> LDL 52 mg/dL HDL 28 mg/dL<L> Trig 94 mg/dL

## 2019-12-11 LAB
ANION GAP SERPL CALC-SCNC: 13 MMO/L — SIGNIFICANT CHANGE UP (ref 7–14)
BUN SERPL-MCNC: 44 MG/DL — HIGH (ref 7–23)
CALCIUM SERPL-MCNC: 8.4 MG/DL — SIGNIFICANT CHANGE UP (ref 8.4–10.5)
CHLORIDE SERPL-SCNC: 104 MMOL/L — SIGNIFICANT CHANGE UP (ref 98–107)
CO2 SERPL-SCNC: 24 MMOL/L — SIGNIFICANT CHANGE UP (ref 22–31)
CREAT SERPL-MCNC: 1.46 MG/DL — HIGH (ref 0.5–1.3)
GLUCOSE BLDC GLUCOMTR-MCNC: 136 MG/DL — HIGH (ref 70–99)
GLUCOSE BLDC GLUCOMTR-MCNC: 139 MG/DL — HIGH (ref 70–99)
GLUCOSE BLDC GLUCOMTR-MCNC: 190 MG/DL — HIGH (ref 70–99)
GLUCOSE BLDC GLUCOMTR-MCNC: 297 MG/DL — HIGH (ref 70–99)
GLUCOSE BLDC GLUCOMTR-MCNC: 306 MG/DL — HIGH (ref 70–99)
GLUCOSE SERPL-MCNC: 147 MG/DL — HIGH (ref 70–99)
HCT VFR BLD CALC: 32.6 % — LOW (ref 39–50)
HGB BLD-MCNC: 9.5 G/DL — LOW (ref 13–17)
MAGNESIUM SERPL-MCNC: 2.2 MG/DL — SIGNIFICANT CHANGE UP (ref 1.6–2.6)
MCHC RBC-ENTMCNC: 28.2 PG — SIGNIFICANT CHANGE UP (ref 27–34)
MCHC RBC-ENTMCNC: 29.1 % — LOW (ref 32–36)
MCV RBC AUTO: 96.7 FL — SIGNIFICANT CHANGE UP (ref 80–100)
NRBC # FLD: 0 K/UL — SIGNIFICANT CHANGE UP (ref 0–0)
PHOSPHATE SERPL-MCNC: 3.3 MG/DL — SIGNIFICANT CHANGE UP (ref 2.5–4.5)
PLATELET # BLD AUTO: 218 K/UL — SIGNIFICANT CHANGE UP (ref 150–400)
PMV BLD: 12.2 FL — SIGNIFICANT CHANGE UP (ref 7–13)
POTASSIUM SERPL-MCNC: 4.2 MMOL/L — SIGNIFICANT CHANGE UP (ref 3.5–5.3)
POTASSIUM SERPL-SCNC: 4.2 MMOL/L — SIGNIFICANT CHANGE UP (ref 3.5–5.3)
RBC # BLD: 3.37 M/UL — LOW (ref 4.2–5.8)
RBC # FLD: 16.8 % — HIGH (ref 10.3–14.5)
SODIUM SERPL-SCNC: 141 MMOL/L — SIGNIFICANT CHANGE UP (ref 135–145)
WBC # BLD: 9.69 K/UL — SIGNIFICANT CHANGE UP (ref 3.8–10.5)
WBC # FLD AUTO: 9.69 K/UL — SIGNIFICANT CHANGE UP (ref 3.8–10.5)

## 2019-12-11 PROCEDURE — 71045 X-RAY EXAM CHEST 1 VIEW: CPT | Mod: 26

## 2019-12-11 PROCEDURE — 99232 SBSQ HOSP IP/OBS MODERATE 35: CPT

## 2019-12-11 RX ORDER — FUROSEMIDE 40 MG
20 TABLET ORAL
Refills: 0 | Status: DISCONTINUED | OUTPATIENT
Start: 2019-12-11 | End: 2019-12-12

## 2019-12-11 RX ORDER — FUROSEMIDE 40 MG
40 TABLET ORAL
Refills: 0 | Status: DISCONTINUED | OUTPATIENT
Start: 2019-12-12 | End: 2019-12-12

## 2019-12-11 RX ADMIN — POLYETHYLENE GLYCOL 3350 17 GRAM(S): 17 POWDER, FOR SOLUTION ORAL at 12:54

## 2019-12-11 RX ADMIN — Medication 20 MILLIGRAM(S): at 18:12

## 2019-12-11 RX ADMIN — CLOPIDOGREL BISULFATE 75 MILLIGRAM(S): 75 TABLET, FILM COATED ORAL at 12:54

## 2019-12-11 RX ADMIN — ATORVASTATIN CALCIUM 40 MILLIGRAM(S): 80 TABLET, FILM COATED ORAL at 21:46

## 2019-12-11 RX ADMIN — INSULIN GLARGINE 18 UNIT(S): 100 INJECTION, SOLUTION SUBCUTANEOUS at 21:46

## 2019-12-11 RX ADMIN — Medication 81 MILLIGRAM(S): at 12:54

## 2019-12-11 RX ADMIN — Medication 7 UNIT(S): at 17:04

## 2019-12-11 RX ADMIN — PANTOPRAZOLE SODIUM 40 MILLIGRAM(S): 20 TABLET, DELAYED RELEASE ORAL at 06:17

## 2019-12-11 RX ADMIN — Medication 40 MILLIGRAM(S): at 06:17

## 2019-12-11 RX ADMIN — Medication 7 UNIT(S): at 12:02

## 2019-12-11 RX ADMIN — Medication 300 MILLIGRAM(S): at 21:46

## 2019-12-11 RX ADMIN — DULOXETINE HYDROCHLORIDE 30 MILLIGRAM(S): 30 CAPSULE, DELAYED RELEASE ORAL at 06:17

## 2019-12-11 RX ADMIN — Medication 7 UNIT(S): at 08:47

## 2019-12-11 RX ADMIN — DULOXETINE HYDROCHLORIDE 30 MILLIGRAM(S): 30 CAPSULE, DELAYED RELEASE ORAL at 18:12

## 2019-12-11 RX ADMIN — Medication 50 MILLIGRAM(S): at 06:17

## 2019-12-11 RX ADMIN — Medication 1: at 17:04

## 2019-12-11 RX ADMIN — DONEPEZIL HYDROCHLORIDE 10 MILLIGRAM(S): 10 TABLET, FILM COATED ORAL at 21:46

## 2019-12-11 RX ADMIN — Medication 3: at 12:02

## 2019-12-11 NOTE — PROGRESS NOTE ADULT - ASSESSMENT
_________________________________________________________________________________________  ========>>  M E D I C A L   A T T E N D I N G    F O L L O W  U P  N O T E  <<=========  -----------------------------------------------------------------------------------------------------    - Patient seen and examined by me earlier today.   - In summary,  HOME ADLER is a 89y year old man who originally presented with CP  - Patient today overall doing ok, comfortable, eating OK. overall feels better     ==================>> REVIEW OF SYSTEM <<=================    GEN: no fever, no chills, no pain now  RESP: no SOB now, no cough, no sputum  CVS: no chest pain now, no palpitations, no edema  GI: no abdominal pain, no nausea, no constipation, no diarrhea  : no dysuria, no frequency, no hematuria  Neuro: no headache, no dizziness  Derm : no itching, no rash    ==================>> PHYSICAL EXAM <<=================    GEN: A&O X 3 , NAD , comfortable, pleasant   HEENT: NCAT, PERRL, MMM, hearing intact  Neck: supple , no JVD  CVS: S1S2 , regular , No M/R/G appreciated  PULM: CTA B/L,  no W/R/R appreciated  ABD.: soft. non tender, non distended,  bowel sounds present  Extrem: intact pulses , no edema   PSYCH : normal mood,  not anxious      ==================>> MEDICATIONS <<====================    MEDICATIONS  (STANDING):  aspirin enteric coated 81 milliGRAM(s) Oral daily  atorvastatin 40 milliGRAM(s) Oral at bedtime  cholecalciferol 1000 Unit(s) Oral daily  clopidogrel Tablet 75 milliGRAM(s) Oral daily  dextrose 5%. 1000 milliLiter(s) (50 mL/Hr) IV Continuous <Continuous>  dextrose 50% Injectable 12.5 Gram(s) IV Push once  dextrose 50% Injectable 25 Gram(s) IV Push once  dextrose 50% Injectable 25 Gram(s) IV Push once  donepezil 10 milliGRAM(s) Oral at bedtime  DULoxetine 30 milliGRAM(s) Oral every 12 hours  furosemide   Injectable 40 milliGRAM(s) IV Push daily  insulin glargine Injectable (LANTUS) 18 Unit(s) SubCutaneous at bedtime  insulin lispro (HumaLOG) corrective regimen sliding scale   SubCutaneous three times a day before meals  insulin lispro (HumaLOG) corrective regimen sliding scale   SubCutaneous at bedtime  insulin lispro Injectable (HumaLOG) 7 Unit(s) SubCutaneous three times a day before meals  metoprolol succinate ER 50 milliGRAM(s) Oral daily  pantoprazole    Tablet 40 milliGRAM(s) Oral before breakfast  phenytoin   Capsule 300 milliGRAM(s) Oral at bedtime    MEDICATIONS  (PRN):  dextrose 40% Gel 15 Gram(s) Oral once PRN Blood Glucose LESS THAN 70 milliGRAM(s)/deciliter  glucagon  Injectable 1 milliGRAM(s) IntraMuscular once PRN Glucose LESS THAN 70 milligrams/deciliter  polyethylene glycol 3350 17 Gram(s) Oral daily PRN Constipation      ==================>> VITAL SIGNS <<==================    T(C): 36.7 (12-09-19 @ 10:00), Max: 36.9 (12-08-19 @ 14:44)  HR: 81 (12-09-19 @ 10:00) (73 - 81)  BP: 129/49 (12-09-19 @ 10:00) (129/49 - 158/73)  RR: 22 (12-09-19 @ 10:00) (17 - 24)  SpO2: 97% (12-09-19 @ 10:00) (95% - 100%)     POCT Blood Glucose.: 131 mg/dL (09 Dec 2019 09:30)  POCT Blood Glucose.: 152 mg/dL (09 Dec 2019 01:18)  POCT Blood Glucose.: 153 mg/dL (08 Dec 2019 22:18)    I&O's Summary    09 Dec 2019 07:01  -  09 Dec 2019 12:52  --------------------------------------------------------  IN: 120 mL / OUT: 925 mL / NET: -805 mL       ==================>> LAB AND IMAGING <<==================                        9.9    10.59 )-----------( 194      ( 09 Dec 2019 04:25 )             33.6        12-09    142  |  105  |  52<H>  ----------------------------<  156<H>  5.0   |  25  |  1.70<H>    Creatinine:  1.70  <<==, 1.82  <<==    Ca    8.7      09 Dec 2019 04:25  Phos  4.3     12-09  Mg     2.2     12-09    TPro  6.9  /  Alb  3.4  /  TBili  0.4  /  DBili  x   /  AST  39  /  ALT  34  /  AlkPhos  144<H>  12-08    PT/INR - ( 08 Dec 2019 14:40 )   PT: 14.3 SEC;   INR: 1.28     PTT - ( 08 Dec 2019 14:40 )  PTT:26.1 SEC               TSH:      2.85   (12-09-19)           Lipid profile:  (12-09-19)     Total: 97     LDL  : 52     HDL  :28     TG   :94     HgA1C: 7.5  (12-09-19)        , 6.7  (10-07-19)            < from: CT Chest No Cont (12.08.19 @ 17:11) >  INTERPRETATION:  Moderate right and small left pleural effusions.   Near-complete atelectasis of the bilateral lower lobes.   Emphysema.  Cholelithiasis.  < end of copied text >  ___________________________________________________________________________________  ===============>>  A S S E S S M E N T   A N D   P L A N <<===============  ------------------------------------------------------------------------------------------    · Assessment		  89 y/o M with hx of CAD s/p stents, CEA, PVD (s/p stent), DM, HTN, recent GI bleed and NSTEMI in Oct 2019, mild dementia [A&Ox4]  presents with chest pain x 1 day. admitted for acute on chronic CHF    Problem/Plan - 1:  ·  Problem: Acute on chronic systolic congestive heart failure  tele  pleural effusion. Near-complete atelectasis of the bilateral lower lobes. Follow official report  lasix as IV   Incentive spirometry  cardiology appreciated  Pulm consulted   strict I&Os  Fluid restriction  LE dopplers ordered    Problem/Plan - 2:  ·  Problem: Acute kidney injury superimposed on chronic kidney disease.  Plan: monitor Cr  possibly due to cardio-renal syndrome   avoid nephrotoxic meds.   monitor on diuretics     Problem/Plan - 3:  ·  Problem: Chest pain.  Plan: trend CE  cont asa, plavix (no recent GIB).   further workup per cardio     Problem/Plan - 4:  ·  Problem: Hypercarbia.    Noted to have pCO2 of 57, pH: 7.33  pulm eval  incentive spirometer  CT showing emphysema     Problem/Plan - 5:  ·  Problem: CAD   cont asa, statin, plavix.     Problem/Plan - 6:  Problem: Anemia. Plan: Monitor H/H  no signs of active bleeding  H/H stable  cont asa, plavix.    Problem/Plan - 7:  ·  Problem: h/o Seizure.    cont phenytoin  and cymbalta.     Problem/Plan - 8:  ·  Problem: Diabetes mellitus, type 2.  Plan: ISS  monitor fingersticks  cont Humalog and lantus  A1C. 7.5    Problem/Plan - 9:  ·  Problem: Need for prophylactic measure.    no pharm vte given recent GIB and anemia   no mechanical vte px now until DVT is ruled out >> veno dynes if negative   cont asa, Plavix (H/H is stable).     Problem/Plan - 10:  Problem: Do not resuscitate status with supporting documentation. Plan; Patient has MOLST form with him stating DNR/DNI. Both son and patient states those are the patient's wishes. Order placed in EMR and copy of MOLST form left in chart.    --------------------------------------------  Case discussed with pt, staff   Education given on findings and plan of care  ___________________________  H. SHELTON Jiménez.  Pager: [phone]
_________________________________________________________________________________________  ========>>  M E D I C A L   A T T E N D I N G    F O L L O W  U P  N O T E  <<=========  -----------------------------------------------------------------------------------------------------    - Patient seen and examined by me earlier today.   - In summary,  HOME ADLER is a 89y year old man who originally presented with CP  - Patient today overall doing ok, comfortable, eating OK. overall feels better     ==================>> REVIEW OF SYSTEM <<=================    GEN: no fever, no chills, no pain now  RESP: no SOB now, no cough, no sputum  CVS: no chest pain now, no palpitations, no edema  GI: no abdominal pain, no nausea, no constipation, no diarrhea  : no dysuria, no frequency, no hematuria  Neuro: no headache, no dizziness  Derm : no itching, no rash    ==================>> PHYSICAL EXAM <<=================    GEN: A&O X 3 , NAD , comfortable, pleasant   HEENT: NCAT, PERRL, MMM, hearing intact  Neck: supple , no JVD  CVS: S1S2 , regular , No M/R/G appreciated  PULM: CTA B/L,  no W/R/R appreciated  ABD.: soft. non tender, non distended,  bowel sounds present  Extrem: intact pulses , no edema   PSYCH : normal mood,  not anxious      ==================>> MEDICATIONS <<====================    aspirin enteric coated 81 milliGRAM(s) Oral daily  atorvastatin 40 milliGRAM(s) Oral at bedtime  cholecalciferol 1000 Unit(s) Oral daily  clopidogrel Tablet 75 milliGRAM(s) Oral daily  dextrose 5%. 1000 milliLiter(s) IV Continuous <Continuous>  dextrose 50% Injectable 12.5 Gram(s) IV Push once  dextrose 50% Injectable 25 Gram(s) IV Push once  dextrose 50% Injectable 25 Gram(s) IV Push once  donepezil 10 milliGRAM(s) Oral at bedtime  DULoxetine 30 milliGRAM(s) Oral every 12 hours  furosemide   Injectable 40 milliGRAM(s) IV Push daily  insulin glargine Injectable (LANTUS) 18 Unit(s) SubCutaneous at bedtime  insulin lispro (HumaLOG) corrective regimen sliding scale   SubCutaneous three times a day before meals  insulin lispro (HumaLOG) corrective regimen sliding scale   SubCutaneous at bedtime  insulin lispro Injectable (HumaLOG) 7 Unit(s) SubCutaneous three times a day before meals  metoprolol succinate ER 50 milliGRAM(s) Oral daily  pantoprazole    Tablet 40 milliGRAM(s) Oral before breakfast  phenytoin   Capsule 300 milliGRAM(s) Oral at bedtime    MEDICATIONS  (PRN):  dextrose 40% Gel 15 Gram(s) Oral once PRN Blood Glucose LESS THAN 70 milliGRAM(s)/deciliter  glucagon  Injectable 1 milliGRAM(s) IntraMuscular once PRN Glucose LESS THAN 70 milligrams/deciliter  polyethylene glycol 3350 17 Gram(s) Oral daily PRN Constipation    ==================>> VITAL SIGNS <<==================    Vital Signs Last 24 Hrs  T(C): 36.4 (12-10-19 @ 14:31)  T(F): 97.6 (12-10-19 @ 14:31), Max: 98.3 (12-09-19 @ 21:58)  HR: 73 (12-10-19 @ 14:31) (72 - 80)  BP: 111/58 (12-10-19 @ 14:31)  RR: 23 (12-10-19 @ 14:31) (16 - 23)  SpO2: 97% (12-10-19 @ 14:31) (96% - 100%)      POCT Blood Glucose.: 124 mg/dL (10 Dec 2019 11:55)  POCT Blood Glucose.: 139 mg/dL (10 Dec 2019 07:38)  POCT Blood Glucose.: 241 mg/dL (09 Dec 2019 22:58)  POCT Blood Glucose.: 190 mg/dL (09 Dec 2019 19:09)     ==================>> LAB AND IMAGING <<==================                        9.3    8.55  )-----------( 195      ( 10 Dec 2019 05:40 )             31.6        141  |  104  |  50<H>  ----------------------------<  142<H>  4.2   |  26  |  1.59<H>    Ca    8.5      10 Dec 2019 05:40  Phos  3.9     12-10  Mg     2.2     12-10    WBC count:   8.55 <<== ,  10.59 <<== ,  10.13 <<==   Hemoglobin:   9.3 <<==,  9.9 <<==,  9.9 <<==  platelets:  195 <==, 194 <==, 215 <==    Creatinine:  1.59  <<==, 1.70  <<==, 1.82  <<==  Sodium:   141  <==, 142  <==, 141  <==    < from: CT Chest No Cont (12.08.19 @ 17:11) >  INTERPRETATION:  Moderate right and small left pleural effusions.   Near-complete atelectasis of the bilateral lower lobes.   Emphysema.  Cholelithiasis.  < end of copied text >  ___________________________________________________________________________________  ===============>>  A S S E S S M E N T   A N D   P L A N <<===============  ------------------------------------------------------------------------------------------    · Assessment		  89 y/o M with hx of CAD s/p stents, CEA, PVD (s/p stent), DM, HTN, recent GI bleed and NSTEMI in Oct 2019, mild dementia [A&Ox4]  presents with chest pain x 1 day. admitted for acute on chronic CHF    Problem/Plan - 1:  ·  Problem: Acute on chronic systolic congestive heart failure  tele  pleural effusion. Near-complete atelectasis of the bilateral lower lobes  lasix as IV   Incentive spirometry  cardiology appreciated  Pulm appreciated  repeat CXR tomorrow   strict I&Os  Fluid restriction  LE dopplers negative   PT / OOB as able    Problem/Plan - 2:  ·  Problem: Acute kidney injury superimposed on chronic kidney disease.    monitor Cr as improved      Creatinine:  1.59  <<==, 1.70  <<==, 1.82  <<==  possibly due to cardio-renal syndrome   avoid nephrotoxic meds.   monitor on diuretics     Problem/Plan - 3:  ·  Problem: Chest pain.  Plan: trend CE  cont asa, plavix   further workup per cardio as needed     Problem/Plan - 4:  ·  Problem: Hypercarbia.    Noted to have pCO2 of 57, pH: 7.33    overall improved   incentive spirometer  CT showing emphysema   pulm f/u     Problem/Plan - 5:  ·  Problem: CAD   cont asa, statin, plavix.     Problem/Plan - 6:  Problem: Anemia. Plan: Monitor H/H  no signs of active bleeding  H/H stable  cont asa, plavix.    Problem/Plan - 7:  ·  Problem: h/o Seizure.    cont phenytoin  and cymbalta.     Problem/Plan - 8:  ·  Problem: Diabetes mellitus, type 2.  Plan: ISS  monitor fingersticks  cont Humalog and lantus  A1C. 7.5    Problem/Plan - 9:  ·  Problem: Need for prophylactic measure.    no pharm vte given recent GIB and anemia   no mechanical vte px now until DVT is ruled out >> veno dynes if negative   cont asa, Plavix (H/H is stable).     Problem/Plan - 10:  Problem: Do not resuscitate status with supporting documentation. Plan; Patient has MOLST form with him stating DNR/DNI. Both son and patient states those are the patient's wishes. Order placed in EMR and copy of MOLST form in chart.    per PT recommending rehab placement> hopefully before the weekend   --------------------------------------------  Case discussed with pt, staff   Education given on findings and plan of care  ___________________________  H. SHELTON Jiménez.  Pager: [phone]
_________________________________________________________________________________________  ========>>  M E D I C A L   A T T E N D I N G    F O L L O W  U P  N O T E  <<=========  -----------------------------------------------------------------------------------------------------    - Patient seen and examined by me earlier today.   - In summary,  HOME ADLER is a 89y year old man who originally presented with CP  - Patient today overall doing ok, comfortable, eating OK. overall feels better , no new c/o or events noted     ==================>> REVIEW OF SYSTEM <<=================    GEN: no fever, no chills, no pain now  RESP: no SOB now, no cough, no sputum  CVS: no chest pain now, no palpitations, no edema  GI: no abdominal pain, no nausea, no constipation, no diarrhea  : no dysuria, no frequency, no hematuria  Neuro: no headache, no dizziness  Derm : no itching, no rash    ==================>> PHYSICAL EXAM <<=================    GEN: A&O X 3 , NAD , comfortable, pleasant   HEENT: NCAT, PERRL, MMM, hearing intact  Neck: supple , no JVD  CVS: S1S2 , regular , No M/R/G appreciated  PULM: CTA B/L,  no W/R/R appreciated  ABD.: soft. non tender, non distended,  bowel sounds present  Extrem: intact pulses , no edema  ( + chronic stasis changes )   PSYCH : normal mood,  not anxious      ==================>> MEDICATIONS <<====================    aspirin enteric coated 81 milliGRAM(s) Oral daily  atorvastatin 40 milliGRAM(s) Oral at bedtime  cholecalciferol 1000 Unit(s) Oral daily  clopidogrel Tablet 75 milliGRAM(s) Oral daily  dextrose 5%. 1000 milliLiter(s) IV Continuous <Continuous>  dextrose 50% Injectable 12.5 Gram(s) IV Push once  dextrose 50% Injectable 25 Gram(s) IV Push once  dextrose 50% Injectable 25 Gram(s) IV Push once  donepezil 10 milliGRAM(s) Oral at bedtime  DULoxetine 30 milliGRAM(s) Oral every 12 hours  furosemide    Tablet 40 milliGRAM(s) Oral <User Schedule>  furosemide    Tablet 20 milliGRAM(s) Oral <User Schedule>  insulin glargine Injectable (LANTUS) 18 Unit(s) SubCutaneous at bedtime  insulin lispro (HumaLOG) corrective regimen sliding scale   SubCutaneous three times a day before meals  insulin lispro (HumaLOG) corrective regimen sliding scale   SubCutaneous at bedtime  insulin lispro Injectable (HumaLOG) 7 Unit(s) SubCutaneous three times a day before meals  metoprolol succinate ER 50 milliGRAM(s) Oral daily  pantoprazole    Tablet 40 milliGRAM(s) Oral before breakfast  phenytoin   Capsule 300 milliGRAM(s) Oral at bedtime    MEDICATIONS  (PRN):  dextrose 40% Gel 15 Gram(s) Oral once PRN Blood Glucose LESS THAN 70 milliGRAM(s)/deciliter  glucagon  Injectable 1 milliGRAM(s) IntraMuscular once PRN Glucose LESS THAN 70 milligrams/deciliter  polyethylene glycol 3350 17 Gram(s) Oral daily PRN Constipation    ==================>> VITAL SIGNS <<==================    Vital Signs Last 24 Hrs  T(C): 36.7 (12-11-19 @ 14:10)  T(F): 98 (12-11-19 @ 14:10), Max: 98.8 (12-11-19 @ 09:40)  HR: 81 (12-11-19 @ 14:10) (78 - 86)  BP: 132/67 (12-11-19 @ 14:10)  RR: 20 (12-11-19 @ 14:10) (20 - 27)  SpO2: 99% (12-11-19 @ 14:10) (97% - 99%)      POCT Blood Glucose.: 297 mg/dL (11 Dec 2019 11:33)  POCT Blood Glucose.: 306 mg/dL (11 Dec 2019 11:32)  POCT Blood Glucose.: 136 mg/dL (11 Dec 2019 08:37)  POCT Blood Glucose.: 110 mg/dL (10 Dec 2019 22:26)  POCT Blood Glucose.: 170 mg/dL (10 Dec 2019 16:34)     ==================>> LAB AND IMAGING <<==================                        9.5    9.69  )-----------( 218      ( 11 Dec 2019 07:00 )             32.6        12-11    141  |  104  |  44<H>  ----------------------------<  147<H>  4.2   |  24  |  1.46<H>    Ca    8.4      11 Dec 2019 07:00  Phos  3.3     12-11  Mg     2.2     12-11    WBC count:   9.69 <<== ,  8.55 <<== ,  10.59 <<== ,  10.13 <<==   Hemoglobin:   9.5 <<==,  9.3 <<==,  9.9 <<==,  9.9 <<==  platelets:  218 <==, 195 <==, 194 <==, 215 <==    Creatinine:  1.46  <<==, 1.59  <<==, 1.70  <<==, 1.82  <<==  Sodium:   141  <==, 141  <==, 142  <==, 141  <==    < from: CT Chest No Cont (12.08.19 @ 17:11) >  INTERPRETATION:  Moderate right and small left pleural effusions.   Near-complete atelectasis of the bilateral lower lobes.   Emphysema.  Cholelithiasis.  < end of copied text >  ___________________________________________________________________________________  ===============>>  A S S E S S M E N T   A N D   P L A N <<===============  ------------------------------------------------------------------------------------------    · Assessment		  89 y/o M with hx of CAD s/p stents, CEA, PVD (s/p stent), DM, HTN, recent GI bleed and NSTEMI in Oct 2019, mild dementia [A&Ox4]  presents with chest pain x 1 day. admitted for acute on chronic CHF    Problem/Plan - 1:  ·  Problem: Acute on chronic systolic congestive heart failure  tele  pleural effusion. Near-complete atelectasis of the bilateral lower lobes  lasix as IV   Incentive spirometry  cardiology appreciated  Pulm appreciated  repeat CXR tomorrow   strict I&Os  Fluid restriction  LE dopplers negative   PT / OOB as able    Problem/Plan - 2:  ·  Problem: Acute kidney injury superimposed on chronic kidney disease.    monitor Cr as improved   Creatinine:  1.46  <<==, 1.59  <<==, 1.70  <<==, 1.82  <<==  possibly due to cardio-renal syndrome   avoid nephrotoxic meds.   monitor on diuretics     Problem/Plan - 3:  ·  Problem: Chest pain.  Plan: trend CE  cont asa, plavix   further workup per cardio as needed     Problem/Plan - 4:  ·  Problem: Hypercarbia.      overall improved   incentive spirometer  CT showing emphysema   pulm f/u and mgmt     Problem/Plan - 5:  ·  Problem: CAD   cont asa, statin, plavix.     Problem/Plan - 6:  Problem: Anemia. Plan: Monitor H/H  no signs of active bleeding  H/H stable  cont asa, plavix.    Problem/Plan - 7:  ·  Problem: h/o Seizure.    cont phenytoin  and cymbalta.     Problem/Plan - 8:  ·  Problem: Diabetes mellitus, type 2.  Plan: ISS  monitor fingersticks  cont Humalog and lantus  A1C. 7.5    Problem/Plan - 9:  ·  Problem: Need for prophylactic measure.    no pharm vte given recent GIB and anemia   no mechanical vte px now until DVT is ruled out >> veno dynes if negative   cont asa, Plavix (H/H is stable).  Hemoglobin:   9.5 <<==,  9.3 <<==,  9.9 <<==,  9.9 <<==    Problem/Plan - 10:  Problem: Do not resuscitate status with supporting documentation. Plan; Patient has MOLST form with him stating DNR/DNI. Both son and patient states those are the patient's wishes. Order placed in EMR and copy of MOLST form in chart.    per PT recommending rehab placement> hopefully before the weekend   --------------------------------------------  Case discussed with pt, staff   Education given on findings and plan of care  ___________________________  H. SHELTON Jiménez.  Pager: [phone]
1. chf systolic improved  2.s/p MI recent moderate to severe LV dysfunction  3. bilateral effusion and atelectasis probably CHF  4. CRI stable  5. hemodynamically stable    Recommend  so to PO lasix 40 AM 20 PM  continue other meds  transfer to rehab?

## 2019-12-11 NOTE — PROGRESS NOTE ADULT - SUBJECTIVE AND OBJECTIVE BOX
PULMONARY PROGRESS NOTE    HOME ADLER  MRN-[id number]    Patient is a 89y old  Male who presents with a chief complaint of chest pain (09 Dec 2019 14:35)  found to have bilateral effusions    HPI: Comfortable this AM, no complaints      ROS: denies chest pain    MEDICATIONS  (STANDING):  aspirin enteric coated 81 milliGRAM(s) Oral daily  atorvastatin 40 milliGRAM(s) Oral at bedtime  cholecalciferol 1000 Unit(s) Oral daily  clopidogrel Tablet 75 milliGRAM(s) Oral daily  dextrose 5%. 1000 milliLiter(s) (50 mL/Hr) IV Continuous <Continuous>  dextrose 50% Injectable 12.5 Gram(s) IV Push once  dextrose 50% Injectable 25 Gram(s) IV Push once  dextrose 50% Injectable 25 Gram(s) IV Push once  donepezil 10 milliGRAM(s) Oral at bedtime  DULoxetine 30 milliGRAM(s) Oral every 12 hours  furosemide   Injectable 40 milliGRAM(s) IV Push daily  insulin glargine Injectable (LANTUS) 18 Unit(s) SubCutaneous at bedtime  insulin lispro (HumaLOG) corrective regimen sliding scale   SubCutaneous three times a day before meals  insulin lispro (HumaLOG) corrective regimen sliding scale   SubCutaneous at bedtime  insulin lispro Injectable (HumaLOG) 7 Unit(s) SubCutaneous three times a day before meals  metoprolol succinate ER 50 milliGRAM(s) Oral daily  pantoprazole    Tablet 40 milliGRAM(s) Oral before breakfast  phenytoin   Capsule 300 milliGRAM(s) Oral at bedtime    MEDICATIONS  (PRN):  dextrose 40% Gel 15 Gram(s) Oral once PRN Blood Glucose LESS THAN 70 milliGRAM(s)/deciliter  glucagon  Injectable 1 milliGRAM(s) IntraMuscular once PRN Glucose LESS THAN 70 milligrams/deciliter  polyethylene glycol 3350 17 Gram(s) Oral daily PRN Constipation        EXAM:  Vital Signs Last 24 Hrs  T(C): 37.1 (11 Dec 2019 09:40), Max: 37.1 (11 Dec 2019 06:14)  T(F): 98.8 (11 Dec 2019 09:40), Max: 98.8 (11 Dec 2019 09:40)  HR: 86 (11 Dec 2019 09:40) (73 - 86)  BP: 122/50 (11 Dec 2019 09:40) (111/58 - 122/50)  BP(mean): --  RR: 27 (11 Dec 2019 09:40) (20 - 27)  SpO2: 97% (11 Dec 2019 09:40) (97% - 99%)    GENERAL: The patient is awake and alert in no apparent distress.     LUNGS: reduced air entry                                           9.5    9.69  )-----------( 218      ( 11 Dec 2019 07:00 )             32.6   12-11    141  |  104  |  44<H>  ----------------------------<  147<H>  4.2   |  24  |  1.46<H>    Ca    8.4      11 Dec 2019 07:00  Phos  3.3     12-11  Mg     2.2     12-11        CT-bilat effusions      LIVER FUNCTIONS - ( 08 Dec 2019 14:40 )  Alb: 3.4 g/dL / Pro: 6.9 g/dL / ALK PHOS: 144 u/L / ALT: 34 u/L / AST: 39 u/L / GGT: x           < from: Xray Chest 1 View- PORTABLE-Routine (12.11.19 @ 08:39) >  FINDINGS/  IMPRESSION:    Bilateral pleural effusions and atelectasis of basilar lower lobes are   unchanged.    < end of copied text >    PROBLEM LIST:  89y Male with HEALTH ISSUES - PROBLEM Dx:  Anemia: Anemia  Do not resuscitate status with supporting documentation: Do not resuscitate status with supporting documentation  Need for prophylactic measure: Need for prophylactic measure  Diabetes mellitus, type 2: Diabetes mellitus, type 2  Seizure: Seizure  HTN (Hypertension): HTN (Hypertension)  CAD (Coronary Artery Disease): CAD (Coronary Artery Disease)  Hypercarbia: Hypercarbia  Chest pain: Chest pain  Acute kidney injury superimposed on chronic kidney disease: Acute kidney injury superimposed on chronic kidney disease  Acute on chronic systolic congestive heart failure: Acute on chronic systolic congestive heart failure      RECS:  Continue diuresis  wean O2 as tolerated  cards fu appreciated      Kendra Brody MD  [phone]

## 2019-12-11 NOTE — PROGRESS NOTE ADULT - SUBJECTIVE AND OBJECTIVE BOX
Subjective: Patient seen and examined. No new events except as noted.   feels w=somehat better receiving pulmonary treatment on IV lasix  renal fucntion stable  MEDICATIONS:  MEDICATIONS  (STANDING):  aspirin enteric coated 81 milliGRAM(s) Oral daily  atorvastatin 40 milliGRAM(s) Oral at bedtime  cholecalciferol 1000 Unit(s) Oral daily  clopidogrel Tablet 75 milliGRAM(s) Oral daily  dextrose 5%. 1000 milliLiter(s) (50 mL/Hr) IV Continuous <Continuous>  dextrose 50% Injectable 12.5 Gram(s) IV Push once  dextrose 50% Injectable 25 Gram(s) IV Push once  dextrose 50% Injectable 25 Gram(s) IV Push once  donepezil 10 milliGRAM(s) Oral at bedtime  DULoxetine 30 milliGRAM(s) Oral every 12 hours  furosemide   Injectable 40 milliGRAM(s) IV Push daily  insulin glargine Injectable (LANTUS) 18 Unit(s) SubCutaneous at bedtime  insulin lispro (HumaLOG) corrective regimen sliding scale   SubCutaneous three times a day before meals  insulin lispro (HumaLOG) corrective regimen sliding scale   SubCutaneous at bedtime  insulin lispro Injectable (HumaLOG) 7 Unit(s) SubCutaneous three times a day before meals  metoprolol succinate ER 50 milliGRAM(s) Oral daily  pantoprazole    Tablet 40 milliGRAM(s) Oral before breakfast  phenytoin   Capsule 300 milliGRAM(s) Oral at bedtime      PHYSICAL EXAM:  T(C): 37.1 (12-11-19 @ 06:14), Max: 37.1 (12-11-19 @ 06:14)  HR: 78 (12-11-19 @ 06:14) (73 - 80)  BP: 120/65 (12-11-19 @ 06:14) (111/58 - 120/65)  RR: 20 (12-11-19 @ 06:14) (20 - 23)  SpO2: 99% (12-11-19 @ 06:14) (97% - 99%)  Wt(kg): --  I&O's Summary    10 Dec 2019 07:01  -  11 Dec 2019 07:00  --------------------------------------------------------  IN: 700 mL / OUT: 400 mL / NET: 300 mL    11 Dec 2019 07:01  -  11 Dec 2019 09:39  --------------------------------------------------------  IN: 0 mL / OUT: 300 mL / NET: -300 mL          Appearance: elderly on mask treatment awake alert oriented  HEENT:   Normal oral mucosa, PERRL, EOMI	  Cardiovascular: Normal S1 S2, No JVD, No murmurs ,  Respiratory: Lungs decreased BS bilaterally 1/3 on right 1/4 on left   Gastrointestinal:  Soft, Non-tender, + BS	  Psychiatry:  Mood & affect appropriate  Ext: N  Peripheral pulses palpable 2+ bilaterally      LABS:    CARDIAC MARKERS:                                9.5    9.69  )-----------( 218      ( 11 Dec 2019 07:00 )             32.6     12-11    141  |  104  |  44<H>  ----------------------------<  147<H>  4.2   |  24  |  1.46<H>    Ca    8.4      11 Dec 2019 07:00  Phos  3.3     12-11  Mg     2.2     12-11      proBNP:   Lipid Profile:   HgA1c:   TSH:     < from: TTE with Doppler (w/Cont) (10.13.19 @ 10:40) >  Conclusions:  1. Mitral annular calcification, otherwise normal mitral  valve.  2. Calcified aortic valve with probably normal opening.  3. Moderately dilated left atrium.  LA volume index = 46  cc/m2.  4. Moderate segmental left ventricular systolic  dysfunction. The basal to mid lateral and basal to mid  anterior walls are best preserved, all other walls are  hypokinetic.  Endocardial visualization enhanced with  intravenous injection of Ultrasonic Enhancing Agent  (Definity).  5. The right ventricle is not well visualized grossly  enlarged.  ------------------------------------------------------------------------  Confirmed on  10/13/2019 - 16:01:40 by Anthony Cormier M.D.  -----------------------------------------------------------------------  < from: Xray Chest 1 View- PORTABLE-Routine (12.11.19 @ 08:39) >  FINDINGS/  IMPRESSION:    Bilateral pleural effusions and atelectasis of basilar lower lobes are   unchanged.      < end of copied text >

## 2019-12-12 VITALS
SYSTOLIC BLOOD PRESSURE: 131 MMHG | WEIGHT: 164.46 LBS | OXYGEN SATURATION: 99 % | RESPIRATION RATE: 20 BRPM | TEMPERATURE: 98 F | HEART RATE: 111 BPM | DIASTOLIC BLOOD PRESSURE: 74 MMHG

## 2019-12-12 LAB
ANION GAP SERPL CALC-SCNC: 16 MMO/L — HIGH (ref 7–14)
BUN SERPL-MCNC: 46 MG/DL — HIGH (ref 7–23)
CALCIUM SERPL-MCNC: 8.8 MG/DL — SIGNIFICANT CHANGE UP (ref 8.4–10.5)
CHLORIDE SERPL-SCNC: 101 MMOL/L — SIGNIFICANT CHANGE UP (ref 98–107)
CO2 SERPL-SCNC: 24 MMOL/L — SIGNIFICANT CHANGE UP (ref 22–31)
CREAT SERPL-MCNC: 1.47 MG/DL — HIGH (ref 0.5–1.3)
GLUCOSE BLDC GLUCOMTR-MCNC: 194 MG/DL — HIGH (ref 70–99)
GLUCOSE SERPL-MCNC: 210 MG/DL — HIGH (ref 70–99)
MAGNESIUM SERPL-MCNC: 2.3 MG/DL — SIGNIFICANT CHANGE UP (ref 1.6–2.6)
POTASSIUM SERPL-MCNC: 4.5 MMOL/L — SIGNIFICANT CHANGE UP (ref 3.5–5.3)
POTASSIUM SERPL-SCNC: 4.5 MMOL/L — SIGNIFICANT CHANGE UP (ref 3.5–5.3)
SODIUM SERPL-SCNC: 141 MMOL/L — SIGNIFICANT CHANGE UP (ref 135–145)

## 2019-12-12 RX ADMIN — Medication 1: at 07:42

## 2019-12-12 RX ADMIN — DULOXETINE HYDROCHLORIDE 30 MILLIGRAM(S): 30 CAPSULE, DELAYED RELEASE ORAL at 05:36

## 2019-12-12 RX ADMIN — Medication 7 UNIT(S): at 07:42

## 2019-12-12 RX ADMIN — Medication 40 MILLIGRAM(S): at 06:02

## 2019-12-12 RX ADMIN — Medication 50 MILLIGRAM(S): at 05:36

## 2019-12-12 RX ADMIN — PANTOPRAZOLE SODIUM 40 MILLIGRAM(S): 20 TABLET, DELAYED RELEASE ORAL at 05:36

## 2019-12-12 NOTE — CHART NOTE - NSCHARTNOTEFT_GEN_A_CORE
Called by RN - pt with periods of desaturation. Pt appeared comfortable. 02 sat at time of assessment 92%. Will continue to  monitor and make patient comfortable.

## 2019-12-12 NOTE — RAPID RESPONSE TEAM SUMMARY - NSSITUATIONBACKGROUNDRRT_GEN_ALL_CORE
89 y/o M with hx of CAD s/p stents, CEA, PVD (s/p stent), DM, HTN, recent GI bleed and NSTEMI in Oct 2019, mild dementia [A&Ox4]  presents with chest pain x 1 day.  RRT called for AMS. Per HAYDE, pt was agonally breathing.  Pt placed on bedside monitor without any rhythm.  Pt was examined by multiple providers, who concurred that the pt had no breath or heart sounds and there were absent pulses in radial and femoral arteries.  DNR/DNI by MOLST was confirmed in chart.  No further action was taken as per instructions in MOLST.

## 2019-12-12 NOTE — PROGRESS NOTE ADULT - SUBJECTIVE AND OBJECTIVE BOX
PULMONARY PROGRESS NOTE    [facility]  MRN-[id number]    Patient is a 89y old  Male who presents with a chief complaint of chest pain (11 Dec 2019 15:45)      HPI:  -admitted for CHF  Feels ok but 02 sat dropped, now on VM 02  -  --    ROS:   -denies pain  -    ACTIVE MEDICATION LIST:  MEDICATIONS  (STANDING):  aspirin enteric coated 81 milliGRAM(s) Oral daily  atorvastatin 40 milliGRAM(s) Oral at bedtime  cholecalciferol 1000 Unit(s) Oral daily  clopidogrel Tablet 75 milliGRAM(s) Oral daily  dextrose 5%. 1000 milliLiter(s) (50 mL/Hr) IV Continuous <Continuous>  dextrose 50% Injectable 12.5 Gram(s) IV Push once  dextrose 50% Injectable 25 Gram(s) IV Push once  dextrose 50% Injectable 25 Gram(s) IV Push once  donepezil 10 milliGRAM(s) Oral at bedtime  DULoxetine 30 milliGRAM(s) Oral every 12 hours  furosemide    Tablet 40 milliGRAM(s) Oral <User Schedule>  furosemide    Tablet 20 milliGRAM(s) Oral <User Schedule>  insulin glargine Injectable (LANTUS) 18 Unit(s) SubCutaneous at bedtime  insulin lispro (HumaLOG) corrective regimen sliding scale   SubCutaneous three times a day before meals  insulin lispro (HumaLOG) corrective regimen sliding scale   SubCutaneous at bedtime  insulin lispro Injectable (HumaLOG) 7 Unit(s) SubCutaneous three times a day before meals  metoprolol succinate ER 50 milliGRAM(s) Oral daily  pantoprazole    Tablet 40 milliGRAM(s) Oral before breakfast  phenytoin   Capsule 300 milliGRAM(s) Oral at bedtime    MEDICATIONS  (PRN):  dextrose 40% Gel 15 Gram(s) Oral once PRN Blood Glucose LESS THAN 70 milliGRAM(s)/deciliter  glucagon  Injectable 1 milliGRAM(s) IntraMuscular once PRN Glucose LESS THAN 70 milligrams/deciliter  polyethylene glycol 3350 17 Gram(s) Oral daily PRN Constipation      EXAM:  Vital Signs Last 24 Hrs  T(C): 36.5 (12 Dec 2019 05:29), Max: 37.2 (11 Dec 2019 20:57)  T(F): 97.7 (12 Dec 2019 05:29), Max: 98.9 (11 Dec 2019 20:57)  HR: 111 (12 Dec 2019 05:29) (81 - 111)  BP: 131/74 (12 Dec 2019 05:29) (121/57 - 144/66)  BP(mean): --  RR: 20 (12 Dec 2019 05:29) (19 - 27)  SpO2: 99% (12 Dec 2019 05:29) (92% - 99%)    GENERAL: The patient is awake and alert in no apparent distress.     SKIN: Warm, dry, no rashes    LUNGS: diminshed BS bilat    HEART: Regular rate and rhythm without murmur.    ABDOMEN: +BS, Soft, Nontender    EXTREMITIES: No clubbing, cyanosis, edema                              9.5    9.69  )-----------( 218      ( 11 Dec 2019 07:00 )             32.6       12-12    141  |  101  |  46<H>  ----------------------------<  210<H>  4.5   |  24  |  1.47<H>    Ca    8.8      12 Dec 2019 05:55  Phos  3.3     12-11  Mg     2.3     12-12          PROBLEM LIST:  89y Male with HEALTH ISSUES - PROBLEM Dx:  Anemia: Anemia  Do not resuscitate status with supporting documentation: Do not resuscitate status with supporting documentation  Need for prophylactic measure: Need for prophylactic measure  Diabetes mellitus, type 2: Diabetes mellitus, type 2  Seizure: Seizure  HTN (Hypertension): HTN (Hypertension)  CAD (Coronary Artery Disease): CAD (Coronary Artery Disease)  Hypercarbia: Hypercarbia  Chest pain: Chest pain  Acute kidney injury superimposed on chronic kidney disease: Acute kidney injury superimposed on chronic kidney disease  Acute on chronic systolic congestive heart failure: Acute on chronic systolic congestive heart failure        RECS:  SOB persists. Would consider for thoracentesis/pleurx; need clarification on whether plavix can be stopped.        Neeraj Venegas MD  [phone]

## 2019-12-12 NOTE — DISCHARGE NOTE FOR THE EXPIRED PATIENT - HOSPITAL COURSE
89 y/o M with hx of CAD s/p stents, CEA, PVD (s/p stent), DM, HTN, recent GI bleed and NSTEMI in Oct 2019, mild dementia [A&Ox4]  presents with chest pain x 1 day, admitted for acute on chronic CHF    +Acute on chronic CHF      -IV Lasix     -strict I&Os     -fluid restriction     -CT chest: Moderate right and small left pleural effusions.  Near-complete atelectasis of the bilateral lower lobes.  Emphysema. Cholelithiasis.  -Cardiology following  -Pulmonary following-->consideration  for thoracentesis/pleurx    +Chest pain     Trop: 52 -->47  -Pulm consulted-- diuresis, consideration for thoracentesis/pleurx  if clinical status did not improve  -12/10 pro bnp 32,200    +UDAY on CKD     -Monitor Cr     -Avoid nephrotoxic meds    +Hypercarbia  -pCO2: 57  -Supplemental Oxygen	    12/12--RRT called, Pt with agonal breathing s/p being placed on the bedpan, pt was placed on 100% nonbreather mask, bedside cardiac monitor, no rhythm. No pulse noted. Pt with noted MOLST form, confirmed DNI/DNR. Agonal breathing progressed to absent breath sound, pt unresponsive, no chest sound, Pupils fixed and dilated, no palpable pulses. Pt pronounced. Family and Attending notified.

## 2019-12-12 NOTE — RAPID RESPONSE TEAM SUMMARY - NSOTHERINTERVENTIONSRRT_GEN_ALL_CORE
No further intervention - patient pronounced dead at 10:08 AM.  Covering team will reach out to family to inform them and give condolences.

## 2019-12-12 NOTE — PROVIDER CONTACT NOTE (OTHER) - ACTION/TREATMENT ORDERED:
ACP made aware. EKG as ordered. Will continue to monitor.
ACP made aware. Will continue to monitor.
Placed pt on nonbreather 100%O2 for 3mins and changed back to 5L Nasal Cannula --now satting 100 spO2. Pt in semi-fowlers position. Pt is a mouth breather so if pt desats again change to Venturi 24%.
OK to put pt on venturi 40%. Will continue to monitor.

## 2020-08-31 NOTE — DIETITIAN INITIAL EVALUATION ADULT. - PERTINENT MEDS FT
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 Referring Family Medicine [phone]    [facility]ticoagulation [facility] Responsible  [phone]             Spoke with patient today regarding therapeutic INR of 2.4.  Patient denies any signs/symptoms of bruising or bleeding or any changes in diet and medications.  Instructed patient to call clinic with any questions or concerns.    Pt is to continue with current warfarin dosing regimen.    Follow up in 1 weeks, to reduce risk of adverse events related to this high risk medication,  Warfarin.    Jairo Lockhart, Pharmacy Intern      
lantus, humalog, cozaar, protonix, toprol, colace, miralax, senna, lipitor, Vitamin D3, aricept

## 2021-03-15 NOTE — GOALS OF CARE CONVERSATION - ADVANCED CARE PLANNING - WHAT MATTERS MOST
Patient has living will stating he would not "like to live as a vegetable"
Principal Discharge DX:	Upper respiratory tract infection, unspecified type

## 2021-09-14 NOTE — PROGRESS NOTE ADULT - PROBLEM SELECTOR PLAN 1
Pt states ticks bites are no better today and would like Elimite cream sent in - Likely acute on chronic systolic heart failure exacerbation due to fluid (TTE 10/2019 demonstrated EF 32%)  - S/p diuresis; clinically improved  - Monitor off supplemental oxygen - Likely acute on chronic systolic heart failure exacerbation due to fluid (TTE 10/2019 demonstrated EF 32%)  - S/p diuresis; clinically improved  - Monitor off supplemental oxygen, goal is to achieve >92% oxygen saturation on ambulation - Likely acute on chronic systolic heart failure exacerbation due to fluid (TTE 10/2019 demonstrated EF 32%)  - S/p diuresis; clinically improved  - To be discharged today

## 2021-09-20 NOTE — PROGRESS NOTE ADULT - PROBLEM SELECTOR PLAN 1
- JACQUELINE in anterior leads, STD in precordial leads, diffuse on repeat EKG  - Troponin 1163 --> ST Depression MI  - Appreciate cardiology and CCU recommendations.   - Given recent history of GIB will management conservatively without heparin gtt  - ASA 81 daily  - Plavix 75 mg daily   - Metoprolol tartrate 25mg po BID  - Obtain TTE  - Primary Cardiologist Dr. Holman following n/a

## 2022-05-06 NOTE — PATIENT PROFILE ADULT - NSPRESCRUSEDDRG_GEN_A_NUR
headache this am- hx of 2  shunts  ha has been intermittent since Tuesday- called neurosurgeon and told to come to ER for scans
No

## 2022-10-19 NOTE — PROGRESS NOTE ADULT - REASON FOR ADMISSION
SOB
chest pain
no discharge, no irritation, no pain, no redness, and no visual changes.

## 2023-01-17 NOTE — DIETITIAN INITIAL EVALUATION ADULT. - PERTINENT MEDS FT
----- Message from MELANIE Cannon sent at 1/15/2023  1:29 PM CST -----  Please let pt know that his/her provider NEHEMIAH Guerin is currently out of the office. Pt's labs were reviewed without any new or acute findings. Keep RTC apt.   (If pt has any new complaints, advise UCC or ED. If pt is needing medication refills, please send a RX request to provider's inbox and it will be addressed. If pt needs to be seen in the clinic for any chronic complaints that can not wait until NEHEMIAH Guerin has returned, then please schedule pt with another NP in clinic, next available.)  Thanks   ----- Message -----  From: Background User Lab  Sent: 1/10/2023   6:51 AM CST  To: Fanta Guerin NP       MEDICATIONS  (STANDING):  aspirin enteric coated 81 milliGRAM(s) Oral daily  atorvastatin 40 milliGRAM(s) Oral at bedtime  cholecalciferol 1000 Unit(s) Oral daily  clopidogrel Tablet 75 milliGRAM(s) Oral daily  dextrose 5%. 1000 milliLiter(s) (50 mL/Hr) IV Continuous <Continuous>  dextrose 50% Injectable 12.5 Gram(s) IV Push once  dextrose 50% Injectable 25 Gram(s) IV Push once  dextrose 50% Injectable 25 Gram(s) IV Push once  donepezil 10 milliGRAM(s) Oral at bedtime  DULoxetine 30 milliGRAM(s) Oral every 12 hours  furosemide   Injectable 40 milliGRAM(s) IV Push daily  insulin glargine Injectable (LANTUS) 18 Unit(s) SubCutaneous at bedtime  insulin lispro (HumaLOG) corrective regimen sliding scale   SubCutaneous three times a day before meals  insulin lispro (HumaLOG) corrective regimen sliding scale   SubCutaneous at bedtime  insulin lispro Injectable (HumaLOG) 7 Unit(s) SubCutaneous three times a day before meals  metoprolol succinate ER 50 milliGRAM(s) Oral daily  pantoprazole    Tablet 40 milliGRAM(s) Oral before breakfast  phenytoin   Capsule 300 milliGRAM(s) Oral at bedtime    MEDICATIONS  (PRN):  dextrose 40% Gel 15 Gram(s) Oral once PRN Blood Glucose LESS THAN 70 milliGRAM(s)/deciliter  glucagon  Injectable 1 milliGRAM(s) IntraMuscular once PRN Glucose LESS THAN 70 milligrams/deciliter  polyethylene glycol 3350 17 Gram(s) Oral daily PRN Constipation

## 2023-02-07 NOTE — PROGRESS NOTE ADULT - SUBJECTIVE AND OBJECTIVE BOX
UNCONTROLLED  - will change pt meds today--> decrease wellbutrin to 150 mg dose and augment with low dose SSRI with sertraline 25 mg  - Discussed potential side effects of this medication with patient including insomnia, changes in sexual performance, weight and appetite changes, fatigue, nausea, dizziness. Reviewed that medication will likely not be fully beneficial for 3-4 weeks, so encouraged pt to give  medication a full month before making decision to change or stop med.   - will send Rx for propranolol for prn use with social anxiety provoking situations or if panic ensues     CC: F/U Bacteremia    Saw/spoke to patient. No fevers, no chills. No new complaints. Unchanged.    Allergies  No Known Allergies    ANTIMICROBIALS:  cefTRIAXone   IVPB 2000 every 24 hours    PE:    Vital Signs Last 24 Hrs  T(C): 37.3 (16 Oct 2019 13:04), Max: 37.3 (16 Oct 2019 13:04)  T(F): 99.1 (16 Oct 2019 13:04), Max: 99.1 (16 Oct 2019 13:04)  HR: 83 (16 Oct 2019 13:04) (73 - 99)  BP: 103/55 (16 Oct 2019 13:04) (103/55 - 126/67)  RR: 18 (16 Oct 2019 13:04) (18 - 18)  SpO2: 92% (16 Oct 2019 13:04) (92% - 97%)    Gen: AOx3, NAD, non-toxic, pleasant  CV: S1+S2 normal, nontachycardic  Resp: Clear bilat, no resp distress, no crackles/wheezes  Abd: Soft, nontender, +BS  Ext: No LE edema, no wounds    LABS:                        7.8    8.39  )-----------( 206      ( 16 Oct 2019 08:11 )             25.4     10-16    137  |  103  |  32<H>  ----------------------------<  233<H>  4.9   |  23  |  1.65<H>    Ca    8.4      16 Oct 2019 06:25  Phos  3.1     10-16  Mg     2.0     10-16    Urinalysis Basic - ( 16 Oct 2019 06:11 )    Color: Light Yellow / Appearance: Clear / S.016 / pH: x  Gluc: x / Ketone: Negative  / Bili: Negative / Urobili: <2 mg/dL   Blood: x / Protein: 30 mg/dL / Nitrite: Negative   Leuk Esterase: Negative / RBC: 2 /HPF / WBC 1 /HPF   Sq Epi: x / Non Sq Epi: 1 /HPF / Bacteria: Negative    MICROBIOLOGY:    .Blood  10-14-19   No growth to date.  --  Blood Culture PCR H influ    Rapid RVP Result: NotDetec (10-14 @ 10:25)    RADIOLOGY:    10/16 XR:    FINDINGS:  Interval new bilateral pleural effusions. No pneumothorax.  Cardiac size cannot accurately be assessed in this projection.     IMPRESSION: Interval new bilateral pleural effusions.

## 2023-05-15 NOTE — H&P ADULT - NSHPSOCIALHISTORY_GEN_ALL_CORE
-never smoker -remote hx of smoking 40 years ago   -denies alcohol and drug use   -used to work at a [employer] company Localized Dermabrasion With Wire Brush Text: The patient was draped in routine manner.  Localized dermabrasion using 3 x 17 mm wire brush was performed in routine manner to papillary dermis. This spot dermabrasion is being performed to complete skin cancer reconstruction. It also will eliminate the other sun damaged precancerous cells that are known to be part of the regional effect of a lifetime's worth of sun exposure. This localized dermabrasion is therapeutic and should not be considered cosmetic in any regard. Localized Dermabrasion Text: The patient was draped in routine manner.  Localized dermabrasion using 3 x 17 mm wire brush was performed in routine manner to papillary dermis. This spot dermabrasion is being performed to complete skin cancer reconstruction. It also will eliminate the other sun damaged precancerous cells that are known to be part of the regional effect of a lifetime's worth of sun exposure. This localized dermabrasion is therapeutic and should not be considered cosmetic in any regard.

## 2023-08-08 NOTE — PROVIDER CONTACT NOTE (OTHER) - REASON
Oxygen saturation Modify Regimen: clobetasol 0.05 % topical ointment - Apply to affected areas BID x 2 weeks on, 1 week off. Repeat prn flares. Render In Strict Bullet Format?: No Detail Level: Zone Initiate Treatment: doxycycline monohydrate 100 mg capsule: Take 1 cap PO QD x 3 weeks.\\nOTC Hibiclens wash to affected areas QD.

## 2024-11-21 NOTE — PROGRESS NOTE ADULT - PROBLEM SELECTOR PROBLEM 6
Quality 111:Pneumonia Vaccination Status For Older Adults: Patient did not receive any pneumococcal conjugate or polysaccharide vaccine on or after their 60th birthday and before the end of the measurement period Chronic blood loss anemia Quality 226: Preventive Care And Screening: Tobacco Use: Screening And Cessation Intervention: Patient screened for tobacco use and is an ex/non-smoker Quality 130: Documentation Of Current Medications In The Medical Record: Current Medications Documented Quality 110: Preventive Care And Screening: Influenza Immunization: Influenza Immunization not Administered because Patient Refused. Quality 431: Preventive Care And Screening: Unhealthy Alcohol Use - Screening: Patient not identified as an unhealthy alcohol user when screened for unhealthy alcohol use using a systematic screening method Detail Level: Detailed Quality 47: Advance Care Plan: Advance Care Planning discussed and documented in the medical record; patient did not wish or was not able to name a surrogate decision maker or provide an advance care plan.

## 2025-03-21 NOTE — ED PROVIDER NOTE - ATTENDING CONTRIBUTION TO CARE
FAMILY HISTORY:  Father  Still living? Yes, Estimated age: Age Unknown  FH: hypertension, Age at diagnosis: Age Unknown    Mother  Still living? Unknown  FH: heart disease, Age at diagnosis: Age Unknown    Grandparent  Still living? Yes, Estimated age: Age Unknown  FH: heart disease, Age at diagnosis: Age Unknown    Aunt  Still living? No  FH: breast cancer, Age at diagnosis: Age Unknown    
MD Villanueva:  patient seen and evaluated personally.   I agree with the History & Physical,  Impression & Plan other than what was detailed in my note.  MD Villanueva    89 y/o m hx of stents on plavix, presenting w/ several days of dark stools, no brbpr, had cp earlier, initially concerning for ischemia, however repeat unchanged. afebrile vitals stable, non toxic, pos pallor ofconjunctiva. pos rectal exam. hg 7.6, transfusion held as no brisk bleed, hemodynamically stable, repeat no sig change. pt admitted to medicine. no need for reversal of ac at this time. pt stable throughout stay in ed.
